# Patient Record
Sex: FEMALE | Race: WHITE | NOT HISPANIC OR LATINO | Employment: OTHER | URBAN - METROPOLITAN AREA
[De-identification: names, ages, dates, MRNs, and addresses within clinical notes are randomized per-mention and may not be internally consistent; named-entity substitution may affect disease eponyms.]

---

## 2017-01-04 ENCOUNTER — HOSPITAL ENCOUNTER (OUTPATIENT)
Dept: MAMMOGRAPHY | Facility: HOSPITAL | Age: 67
Discharge: HOME | End: 2017-01-04
Attending: INTERNAL MEDICINE | Admitting: INTERNAL MEDICINE

## 2017-01-04 DIAGNOSIS — Z00.00 HEALTH CARE MAINTENANCE: ICD-10-CM

## 2017-01-04 PROCEDURE — G0202 SCR MAMMO BI INCL CAD: HCPCS

## 2017-01-12 ENCOUNTER — OFFICE VISIT (OUTPATIENT)
Dept: ORTHOPEDIC SURGERY | Facility: CLINIC | Age: 67
End: 2017-01-12

## 2017-01-12 VITALS — HEIGHT: 65 IN | WEIGHT: 170 LBS | BODY MASS INDEX: 28.32 KG/M2 | TEMPERATURE: 98.6 F

## 2017-01-12 DIAGNOSIS — G89.29 CHRONIC LEFT SHOULDER PAIN: Primary | ICD-10-CM

## 2017-01-12 DIAGNOSIS — IMO0002 BURSITIS/TENDONITIS, SHOULDER: ICD-10-CM

## 2017-01-12 DIAGNOSIS — M25.512 CHRONIC LEFT SHOULDER PAIN: Primary | ICD-10-CM

## 2017-01-12 PROCEDURE — 99203 OFFICE O/P NEW LOW 30 MIN: CPT | Performed by: ORTHOPAEDIC SURGERY

## 2017-01-12 PROCEDURE — 73030 X-RAY EXAM OF SHOULDER: CPT | Performed by: ORTHOPAEDIC SURGERY

## 2017-01-12 PROCEDURE — 20610 DRAIN/INJ JOINT/BURSA W/O US: CPT | Performed by: ORTHOPAEDIC SURGERY

## 2017-01-12 PROCEDURE — 73010 X-RAY EXAM OF SHOULDER BLADE: CPT | Performed by: ORTHOPAEDIC SURGERY

## 2017-01-12 RX ORDER — METHYLPREDNISOLONE ACETATE 80 MG/ML
80 INJECTION, SUSPENSION INTRA-ARTICULAR; INTRALESIONAL; INTRAMUSCULAR; SOFT TISSUE
Status: COMPLETED | OUTPATIENT
Start: 2017-01-12 | End: 2017-01-12

## 2017-01-12 RX ORDER — BUPIVACAINE HYDROCHLORIDE 5 MG/ML
4 INJECTION, SOLUTION PERINEURAL
Status: COMPLETED | OUTPATIENT
Start: 2017-01-12 | End: 2017-01-12

## 2017-01-12 RX ADMIN — METHYLPREDNISOLONE ACETATE 80 MG: 80 INJECTION, SUSPENSION INTRA-ARTICULAR; INTRALESIONAL; INTRAMUSCULAR; SOFT TISSUE at 14:57

## 2017-01-12 RX ADMIN — BUPIVACAINE HYDROCHLORIDE 4 ML: 5 INJECTION, SOLUTION PERINEURAL at 14:57

## 2017-01-12 NOTE — MR AVS SNAPSHOT
"                        Marlyn Whitney   1/12/2017 1:45 PM   Office Visit    Dept Phone:  [phone]   Encounter #:  [id number]    Provider:  Josee Fish MD   Department:  [facility]                Your Full Care Plan              Your Updated Medication List          This list is accurate as of: 1/12/17  3:16 PM.  Always use your most recent med list.                ALPRAZolam 1 MG tablet   Commonly known as:  XANAX   TAKE 1 TABLET BY MOUTH 4 TIMES A DAY       amLODIPine-benazepril 5-20 MG per capsule   Commonly known as:  LOTREL 5-20   TAKE ONE CAPSULE BY MOUTH EVERY DAY       atorvastatin 80 MG tablet   Commonly known as:  LIPITOR   TAKE 1 TABLET EVERY DAY       azelastine 0.1 % nasal spray   Commonly known as:  ASTELIN       B-D INS SYRINGE 0.5CC/30GX1/2\" 30G X 1/2\" 0.5 ML misc   Generic drug:  Insulin Syringe-Needle U-100   FOR DAILY USE WITH LANTUS       betamethasone dipropionate 0.05 % cream   Commonly known as:  DIPROLENE       fenofibrate 160 MG tablet   TAKE 1 TABLET DAILY.       fluconazole 150 MG tablet   Commonly known as:  DIFLUCAN   Take 1 tablet by mouth Daily.       * glucose blood test strip       * ONETOUCH ULTRA BLUE VI       HUMALOG 100 UNIT/ML injection   Generic drug:  insulin lispro   INJECT 15 U. AT BREAFKAST 20 U. AT LUNCH & 15 U AT SUPPER UP TO 50 UN.PER DAY WITH SLIDING SCALE       hydrochlorothiazide 25 MG tablet   Commonly known as:  HYDRODIURIL   TAKE 1 TABLET BY MOUTH DAILY.       HYDROcodone-acetaminophen  MG per tablet   Commonly known as:  NORCO   Take 1 tablet by mouth Every 4 (Four) Hours As Needed for moderate pain (4-6).       insulin glargine 100 UNIT/ML injection   Commonly known as:  LANTUS       Insulin Pen Needle 31G X 8 MM misc       Linaclotide 145 MCG capsule   Commonly known as:  LINZESS   Take 145 mcg by mouth daily.       metFORMIN 1000 MG tablet   Commonly known as:  GLUCOPHAGE   Take 1 tablet " by mouth 2 (Two) Times a Day With Meals.       nabumetone 750 MG tablet   Commonly known as:  RELAFEN   TAKE 1 TABLET BY MOUTH 2 (TWO) TIMES A DAY AS NEEDED FOR MILD PAIN (1-3) OR MODERATE PAIN (4-6).       omeprazole 20 MG capsule   Commonly known as:  priLOSEC   TAKE 1 CAPSULE BY MOUTH DAILY       vitamin D 49273 UNITS capsule capsule   Commonly known as:  ERGOCALCIFEROL       * Notice:  This list has 2 medication(s) that are the same as other medications prescribed for you. Read the directions carefully, and ask your doctor or other care provider to review them with you.            We Performed the Following     Large Joint Arthrocentesis       You Were Diagnosed With        Codes Comments    Chronic left shoulder pain    -  Primary ICD-10-CM: M25.512, G89.29  ICD-9-CM: 719.41, 338.29       Instructions     None    Patient Instructions History      Upcoming Appointments     Visit Type Date Time Department    OFFICE VISIT 1/12/2017  1:45 PM [facility]    FOLLOW UP 2/13/2017  2:40 PM [facility]    OFFICE VISIT 4/21/2017  3:45 PM [facility] [id number]      [email or web address] Signup     Our records indicate that your [facility] [employer] account has been deactivated. If you would like to reactivate your account, please email [email or web address] or call [phone] to talk to our [employer] staff.             Other Info from Your Visit           Your Appointments     Feb 13, 2017  2:40 PM EST   Follow Up with Josee Fish MD   [facility] (--)    [address] [facility] [address]  [place] [id number] [phone]           Arrive 15 minutes prior to appointment.            Apr 21, 2017  3:45 PM EDT   Office Visit with Frank Mustafa MD   [facility] (--)    [address] [facility]  [facility] [address] [phone]           Arrive 15 minutes prior to appointment.              Allergies     Gabapentin      Lyrica  "[Pregabalin]      Wellbutrin [Bupropion]        Reason for Visit     Left Shoulder - Pain           Vital Signs     Temperature Height Weight Last Menstrual Period Body Mass Index Smoking Status    98.6 °F (37 °C) (Temporal Artery ) 65\" (165.1 cm) 170 lb (77.1 kg) (LMP Unknown) 28.29 kg/m2 Former Smoker      Problems and Diagnoses Noted     Pain in left shoulder        "

## 2017-01-12 NOTE — PROGRESS NOTES
"   New Shoulder      Patient: Marlyn Whitney        YOB: 1950    Medical Record Number: [id number]        Chief Complaints: Left shoulder pain  Chief Complaint   Patient presents with   • Left Shoulder - Pain         History of Present Illness: This is a  66 y.o. female who presents with complaints of left shoulder pain.  She is right-hand-dominant.  She states is been ongoing for a year worse the last 8 months.  She did not have one particular event that started it but she states her  who weighed 260 pounds was ill and she was having to lift him.  She did lose her  recently in sooner after lost a son.  She states she's been sleeping in a recliner for last 8 months as her shoulder hurts she has night pain no history of similar symptoms.  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  She describes her symptoms as severe constant stabbing aching with swelling her past medical history is mild for diabetes seizures anemia liver disease and depression anxiety  pain as a 7 out of 10 per the patient          Allergies:   Allergies   Allergen Reactions   • Gabapentin    • Lyrica [Pregabalin]    • Wellbutrin [Bupropion]        Medications:   Home Medications:  Current Outpatient Prescriptions on File Prior to Visit   Medication Sig   • ALPRAZolam (XANAX) 1 MG tablet TAKE 1 TABLET BY MOUTH 4 TIMES A DAY   • amLODIPine-benazepril (LOTREL 5-20) 5-20 MG per capsule TAKE ONE CAPSULE BY MOUTH EVERY DAY   • atorvastatin (LIPITOR) 80 MG tablet TAKE 1 TABLET EVERY DAY   • azelastine (ASTELIN) 0.1 % nasal spray into each nostril 2 (two) times a day.   • B-D INS SYRINGE 0.5CC/30GX1/2\" 30G X 1/2\" 0.5 ML misc FOR DAILY USE WITH LANTUS   • betamethasone dipropionate (DIPROLENE) 0.05 % cream Apply topically 2 (two) times a day.   • fenofibrate 160 MG tablet TAKE 1 TABLET DAILY.   • fluconazole (DIFLUCAN) 150 MG tablet Take 1 tablet by " mouth Daily.   • Glucose Blood (ONETOUCH ULTRA BLUE VI) OneTouch Ultra Blue In Vitro Strip; Patient Sig: OneTouch Ultra Blue In Vitro Strip USE TO CHECK BLOOD SUGAR 4 TIMES DAILY; 100; 4; 21-Feb-2013; Active   • glucose blood test strip OneTouch Ultra Blue In Vitro Strip; Patient Sig: OneTouch Ultra Blue In Vitro Strip USE TO CHECK BLOOD SUGAR 4 TIMES DAILY; 100; 4; 21-Feb-2013; Active   • HUMALOG 100 UNIT/ML injection INJECT 15 U. AT BREAFKAST 20 U. AT LUNCH & 15 U AT SUPPER UP TO 50 UN.PER DAY WITH SLIDING SCALE   • hydrochlorothiazide (HYDRODIURIL) 25 MG tablet TAKE 1 TABLET BY MOUTH DAILY.   • HYDROcodone-acetaminophen (NORCO)  MG per tablet Take 1 tablet by mouth Every 4 (Four) Hours As Needed for moderate pain (4-6).   • insulin glargine (LANTUS) 100 UNIT/ML injection Inject  under the skin.   • Insulin Pen Needle 31G X 8 MM misc    • Linaclotide (LINZESS) 145 MCG capsule Take 145 mcg by mouth daily.   • metFORMIN (GLUCOPHAGE) 1000 MG tablet Take 1 tablet by mouth 2 (Two) Times a Day With Meals.   • nabumetone (RELAFEN) 750 MG tablet TAKE 1 TABLET BY MOUTH 2 (TWO) TIMES A DAY AS NEEDED FOR MILD PAIN (1-3) OR MODERATE PAIN (4-6).   • omeprazole (priLOSEC) 20 MG capsule TAKE 1 CAPSULE BY MOUTH DAILY   • vitamin D (ERGOCALCIFEROL) 31446 UNITS capsule capsule Take  by mouth Every 7 (Seven) Days.     No current facility-administered medications on file prior to visit.      Current Medications:  Scheduled Meds:  Continuous Infusions:  No current facility-administered medications for this visit.   PRN Meds:.    Past Medical History   Diagnosis Date   • Cystitis    • Diabetic peripheral neuropathy    • Nephrolithiasis         Past Surgical History   Procedure Laterality Date   • Liver biopsy  11/01/2012   • Upper gastrointestinal endoscopy  11/26/2012   • Partial hysterectomy     • Laparoscopic cholecystectomy w/ cholangiography  11/01/2012   • Breast biopsy          Social History     Occupational History   •  "Not on file.     Social History Main Topics   • Smoking status: Former Smoker   • Smokeless tobacco: Not on file   • Alcohol use No   • Drug use: No   • Sexual activity: Not on file    Social History     Social History Narrative      History reviewed. No pertinent family history.          Review of Systems: 14 point review of systems are remarkable for the pertinent positives listed in the chart by the patient the remainder are negative    Review of Systems      Physical Exam: 66 y.o. female  General Appearance:    Alert, cooperative, in no acute distress                 Vitals:    01/12/17 1427   Temp: 98.6 °F (37 °C)   TempSrc: Temporal Artery    Weight: 170 lb (77.1 kg)   Height: 65\" (165.1 cm)      Patient is alert and read ×3 no acute distress appears her above-listed at height weight and age.  Affect is normal respiratory rate is normal unlabored. Heart rate regular rate rhythm, sclera, dentition and hearing are normal for the purpose of this exam.    Ortho Exam Physical exam of the left shoulder reveals no overlying skin changes no lymphedema no lymphadenopathy.  Patient has active flexion 180 with mild symptoms abduction is similar external rotation is to 50 and internal rotation to the upper lumbar spine with mild symptoms.  Patient has good rotator cuff strength 4+ over 5 with isometric strength testing with pain.  Patient has a positive impingement and a positive Adams sign.  Patient has good cervical range of motion which is full and asymptomatic no radicular symptoms.  Patient has a normal elbow exam.  Good distal pulses are presentPatient has pain with overhead activity and a positive Neer sign and a positive empty can sign        Large Joint Arthrocentesis  Date/Time: 1/12/2017 2:57 PM  Consent given by: patient  Site marked: site marked  Timeout: Immediately prior to procedure a time out was called to verify the correct patient, procedure, equipment, support staff and site/side marked as required "   Supporting Documentation  Indications: pain   Procedure Details  Location: shoulder - L subacromial bursa  Preparation: Patient was prepped and draped in the usual sterile fashion  Needle size: 25 G  Approach: posterior  Medications administered: 80 mg methylPREDNISolone acetate 80 MG/ML; 4 mL bupivacaine 0.5 %  Patient tolerance: patient tolerated the procedure well with no immediate complications                Radiology:   AP, Scapular Y and Axillary Lateral of the left shoulder were ordered/reviewed to evauate shoulder pain.  I've no comparative films.  She does have degenerative changes seen at the acromioclavicular joint otherwise a heads well seated within the glenoid    Assessment/Plan:  Left shoulder pain I think this is more impingement it is rotator cuff and some fashion plan is to proceed with an injection and some strengthening exercises.  I'll see her back in 4 weeks if she fails improvement we will pursue other means of testing

## 2017-01-18 RX ORDER — HYDROCODONE BITARTRATE AND ACETAMINOPHEN 10; 325 MG/1; MG/1
1 TABLET ORAL EVERY 4 HOURS PRN
Qty: 180 TABLET | Refills: 0 | Status: SHIPPED | OUTPATIENT
Start: 2017-01-18 | End: 2017-02-16 | Stop reason: SDUPTHER

## 2017-01-25 RX ORDER — FENOFIBRATE 160 MG/1
TABLET ORAL
Qty: 90 TABLET | Refills: 0 | Status: SHIPPED | OUTPATIENT
Start: 2017-01-25 | End: 2017-05-12 | Stop reason: SDUPTHER

## 2017-02-13 ENCOUNTER — OFFICE VISIT (OUTPATIENT)
Dept: ORTHOPEDIC SURGERY | Facility: CLINIC | Age: 67
End: 2017-02-13

## 2017-02-13 VITALS — HEIGHT: 65 IN | BODY MASS INDEX: 27.49 KG/M2 | WEIGHT: 165 LBS | TEMPERATURE: 97.5 F

## 2017-02-13 DIAGNOSIS — M75.02 ADHESIVE CAPSULITIS OF LEFT SHOULDER: Primary | ICD-10-CM

## 2017-02-13 PROCEDURE — 99212 OFFICE O/P EST SF 10 MIN: CPT | Performed by: ORTHOPAEDIC SURGERY

## 2017-02-13 PROCEDURE — 20610 DRAIN/INJ JOINT/BURSA W/O US: CPT | Performed by: ORTHOPAEDIC SURGERY

## 2017-02-13 RX ADMIN — METHYLPREDNISOLONE ACETATE 80 MG: 80 INJECTION, SUSPENSION INTRA-ARTICULAR; INTRALESIONAL; INTRAMUSCULAR; SOFT TISSUE at 16:06

## 2017-02-13 RX ADMIN — BUPIVACAINE HYDROCHLORIDE 4 ML: 5 INJECTION, SOLUTION PERINEURAL at 16:06

## 2017-02-13 NOTE — PROGRESS NOTES
"Shoulder Follow Up      Patient: Marlyn Whitney        YOB: 1950            Chief Complaints: Left Shoulder pain      History of Present Illness: Patient is here follow-up of left shoulder pain I injected last visit thinking it was more impingement she states she got a couple days relief but now is pretty miserable.  She is here for a new direction.  She describes she has marked limitation range of motion now pain at night pain that is severe with any activity      Physical Exam: 67 y.o. female  General Appearance:    Alert, cooperative, in no acute distress                   Vitals:    02/13/17 1524   Temp: 97.5 °F (36.4 °C)   Weight: 165 lb (74.8 kg)   Height: 65\" (165.1 cm)        Patient is alert and read ×3 no acute distress appears her above-listed at height weight and age.  Affect is normal respiratory rate is normal unlabored. Heart rate regular rate rhythm, sclera, dentition and hearing are normal for the purpose of this exam.      Ortho Exam    Physical exam of the left shoulder reveals no overlying skin changes no lymphedema no lymphadenopathy.  Patient has active flexion 150 with mild symptoms passively I can get him to about 160 abduction is similar external rotation is to 0 and internal rotation to his buttocks with  symptoms.  Patient has good rotator cuff strength 4+ over 5 with isometric strength testing with pain.  Patient has a positive impingement and a positive Adams sign.  Patient has good cervical range of motion which is full and asymptomatic no radicular symptoms.  Patient has a normal elbow exam.  Good distal pulses are present      Large Joint Arthrocentesis  Date/Time: 2/13/2017 4:06 PM  Consent given by: patient  Site marked: site marked  Timeout: Immediately prior to procedure a time out was called to verify the correct patient, procedure, equipment, support staff and site/side marked as required   Supporting Documentation  Indications: pain   Procedure " Details  Location: shoulder - L glenohumeral  Preparation: Patient was prepped and draped in the usual sterile fashion  Needle size: 25 G  Approach: posterior  Medications administered: 4 mL bupivacaine; 80 mg methylPREDNISolone acetate 80 MG/ML  Patient tolerance: patient tolerated the procedure well with no immediate complications            Assessment/Plan:      This lady presents for follow-up left shoulder pain I initially thought this was more impingement however after repeat exam today I do think she has limitation of her range of motion and she is a frozen shoulder.  I explained to her that very frequently impingement frozen shoulder roll present the same way.  Her motion is clearly limited today I think her diagnosis is adhesive capsulitis I think she would benefit from a glenohumeral joint injection and physical therapy which she is agreeable to do.  Based on the fact that this was a complete reevaluation and a change in direction a diagnosis and a different area injected I do think an office visit chart is warranted.  She understands if we do not regain her motion we will proceed with a manipulation.  When her motion has returned if she is still symptomatic we will pursue an MRI

## 2017-02-14 RX ORDER — METHYLPREDNISOLONE ACETATE 80 MG/ML
80 INJECTION, SUSPENSION INTRA-ARTICULAR; INTRALESIONAL; INTRAMUSCULAR; SOFT TISSUE
Status: COMPLETED | OUTPATIENT
Start: 2017-02-13 | End: 2017-02-13

## 2017-02-14 RX ORDER — BUPIVACAINE HYDROCHLORIDE 5 MG/ML
4 INJECTION, SOLUTION PERINEURAL
Status: COMPLETED | OUTPATIENT
Start: 2017-02-13 | End: 2017-02-13

## 2017-02-16 RX ORDER — HYDROCODONE BITARTRATE AND ACETAMINOPHEN 10; 325 MG/1; MG/1
1 TABLET ORAL EVERY 4 HOURS PRN
Qty: 180 TABLET | Refills: 0 | Status: SHIPPED | OUTPATIENT
Start: 2017-02-16 | End: 2017-03-16 | Stop reason: SDUPTHER

## 2017-03-06 RX ORDER — ALPRAZOLAM 1 MG/1
TABLET ORAL
Qty: 120 TABLET | Refills: 0 | Status: SHIPPED | OUTPATIENT
Start: 2017-03-06 | End: 2017-04-03 | Stop reason: SDUPTHER

## 2017-03-17 RX ORDER — HYDROCODONE BITARTRATE AND ACETAMINOPHEN 10; 325 MG/1; MG/1
1 TABLET ORAL EVERY 4 HOURS PRN
Qty: 180 TABLET | Refills: 0 | Status: SHIPPED | OUTPATIENT
Start: 2017-03-17 | End: 2017-04-21 | Stop reason: SDUPTHER

## 2017-03-20 RX ORDER — NAPROXEN SODIUM 220 MG
TABLET ORAL
Qty: 100 EACH | Refills: 11 | Status: SHIPPED | OUTPATIENT
Start: 2017-03-20 | End: 2018-09-10 | Stop reason: SDUPTHER

## 2017-03-28 RX ORDER — AMLODIPINE BESYLATE AND BENAZEPRIL HYDROCHLORIDE 5; 20 MG/1; MG/1
1 CAPSULE ORAL DAILY
Qty: 90 CAPSULE | Refills: 2 | Status: SHIPPED | OUTPATIENT
Start: 2017-03-28 | End: 2017-04-21 | Stop reason: SDUPTHER

## 2017-03-28 RX ORDER — OMEPRAZOLE 20 MG/1
CAPSULE, DELAYED RELEASE ORAL
Qty: 90 CAPSULE | Refills: 0 | Status: SHIPPED | OUTPATIENT
Start: 2017-03-28 | End: 2017-06-29 | Stop reason: SDUPTHER

## 2017-04-04 RX ORDER — ALPRAZOLAM 1 MG/1
TABLET ORAL
Qty: 120 TABLET | Refills: 0 | OUTPATIENT
Start: 2017-04-04 | End: 2017-05-31 | Stop reason: SDUPTHER

## 2017-04-21 ENCOUNTER — OFFICE VISIT (OUTPATIENT)
Dept: INTERNAL MEDICINE | Facility: CLINIC | Age: 67
End: 2017-04-21

## 2017-04-21 VITALS
WEIGHT: 170 LBS | DIASTOLIC BLOOD PRESSURE: 71 MMHG | TEMPERATURE: 98.9 F | BODY MASS INDEX: 28.32 KG/M2 | HEIGHT: 65 IN | OXYGEN SATURATION: 97 % | HEART RATE: 97 BPM | SYSTOLIC BLOOD PRESSURE: 146 MMHG

## 2017-04-21 DIAGNOSIS — K76.0 NONALCOHOLIC FATTY LIVER DISEASE: ICD-10-CM

## 2017-04-21 DIAGNOSIS — E53.8 COBALAMIN DEFICIENCY: ICD-10-CM

## 2017-04-21 DIAGNOSIS — IMO0001 IDDM (INSULIN DEPENDENT DIABETES MELLITUS): ICD-10-CM

## 2017-04-21 DIAGNOSIS — Z00.00 MEDICARE ANNUAL WELLNESS VISIT, INITIAL: Primary | ICD-10-CM

## 2017-04-21 DIAGNOSIS — E78.49 OTHER HYPERLIPIDEMIA: ICD-10-CM

## 2017-04-21 DIAGNOSIS — E55.9 VITAMIN D DEFICIENCY: ICD-10-CM

## 2017-04-21 DIAGNOSIS — N30.00 ACUTE CYSTITIS WITHOUT HEMATURIA: ICD-10-CM

## 2017-04-21 DIAGNOSIS — M15.9 GENERALIZED OSTEOARTHRITIS: ICD-10-CM

## 2017-04-21 DIAGNOSIS — F41.1 GENERALIZED ANXIETY DISORDER: ICD-10-CM

## 2017-04-21 DIAGNOSIS — K59.01 SLOW TRANSIT CONSTIPATION: ICD-10-CM

## 2017-04-21 PROCEDURE — G0402 INITIAL PREVENTIVE EXAM: HCPCS | Performed by: INTERNAL MEDICINE

## 2017-04-21 PROCEDURE — 99214 OFFICE O/P EST MOD 30 MIN: CPT | Performed by: INTERNAL MEDICINE

## 2017-04-21 RX ORDER — FLUCONAZOLE 150 MG/1
150 TABLET ORAL DAILY
Qty: 5 TABLET | Refills: 0 | Status: SHIPPED | OUTPATIENT
Start: 2017-04-21 | End: 2017-06-29

## 2017-04-21 RX ORDER — HYDROCODONE BITARTRATE AND ACETAMINOPHEN 10; 325 MG/1; MG/1
1 TABLET ORAL EVERY 4 HOURS PRN
Qty: 180 TABLET | Refills: 0 | Status: SHIPPED | OUTPATIENT
Start: 2017-04-21 | End: 2017-05-16 | Stop reason: SDUPTHER

## 2017-04-21 RX ORDER — AMLODIPINE BESYLATE AND BENAZEPRIL HYDROCHLORIDE 5; 20 MG/1; MG/1
1 CAPSULE ORAL DAILY
Qty: 90 CAPSULE | Refills: 2 | Status: SHIPPED | OUTPATIENT
Start: 2017-04-21 | End: 2017-06-29

## 2017-04-21 RX ORDER — HYDROCODONE BITARTRATE AND ACETAMINOPHEN 10; 325 MG/1; MG/1
1 TABLET ORAL EVERY 4 HOURS PRN
Qty: 180 TABLET | Refills: 0 | Status: SHIPPED | OUTPATIENT
Start: 2017-04-21 | End: 2017-04-21 | Stop reason: SDUPTHER

## 2017-04-21 RX ORDER — NITROFURANTOIN 25; 75 MG/1; MG/1
100 CAPSULE ORAL 2 TIMES DAILY
Qty: 16 CAPSULE | Refills: 0 | Status: SHIPPED | OUTPATIENT
Start: 2017-04-21 | End: 2017-05-01 | Stop reason: SDUPTHER

## 2017-04-21 NOTE — PATIENT INSTRUCTIONS
Medicare Wellness  Personal Prevention Plan of Service     Date of Office Visit:  2017  Encounter Provider:  Frank Mustafa MD  Place of Service:  [facility]  Patient Name: Marlyn Whitney  :  1950    As part of the Medicare Wellness portion of your visit today, we are providing you with this personalized preventive plan of services (PPPS). This plan is based upon recommendations of the [place] Preventive Services Task Force (USPSTF) and the Advisory Committee on Immunization Practices (ACIP).    This lists the preventive care services that should be considered, and provides dates of when you are due. Items listed as completed are up-to-date and do not require any further intervention.    Health Maintenance   Topic Date Due   • TDAP/TD VACCINES (1 - Tdap) 1971   • ZOSTER VACCINE  2016   • LIPID PANEL  2017   • HEMOGLOBIN A1C  2017   • DIABETIC EYE EXAM  05/15/2017   • DIABETIC FOOT EXAM  10/21/2017   • URINE MICROALBUMIN  10/21/2017   • MEDICARE ANNUAL WELLNESS  2018   • PNEUMOCOCCAL VACCINES (65+ LOW/MEDIUM RISK) (2 of 2 - PPSV23) 2018   • MAMMOGRAM  2019   • PAP SMEAR  2020   • COLONOSCOPY  2027   • HEPATITIS C SCREENING  Addressed   • INFLUENZA VACCINE  Addressed       No orders of the defined types were placed in this encounter.      Return in about 6 months (around 10/21/2017).

## 2017-04-21 NOTE — PROGRESS NOTES
QUICK REFERENCE INFORMATION:  The ABCs of the Annual Wellness Visit    Initial Medicare Wellness Visit    HEALTH RISK ASSESSMENT    1950    Recent Hospitalizations:  No recent hospitalization(s)..        Current Medical Providers:  Patient Care Team:  Frank Mustafa MD as PCP - General        Smoking Status:  History   Smoking Status   • Former Smoker   Smokeless Tobacco   • Not on file       Alcohol Consumption:  History   Alcohol Use No       Depression Screen:   PHQ-9 Depression Screening 4/21/2017   Little interest or pleasure in doing things 0   Feeling down, depressed, or hopeless 1   Trouble falling or staying asleep, or sleeping too much 0   Feeling tired or having little energy 0   Poor appetite or overeating 0   Feeling bad about yourself - or that you are a failure or have let yourself or your family down 0   Trouble concentrating on things, such as reading the newspaper or watching television 0   Moving or speaking so slowly that other people could have noticed. Or the opposite - being so fidgety or restless that you have been moving around a lot more than usual 0   Thoughts that you would be better off dead, or of hurting yourself in some way 0   PHQ-9 Total Score 1   If you checked off any problems, how difficult have these problems made it for you to do your work, take care of things at home, or get along with other people? Not difficult at all       Health Habits and Functional and Cognitive Screening:  No flowsheet data found.               Does the patient have evidence of cognitive impairment? No    Asiprin use counseling: Start ASA 81 mg daily       Recent Lab Results:    Visual Acuity:  No exam data present    Age-appropriate Screening Schedule:  Refer to the list below for future screening recommendations based on patient's age, sex and/or medical conditions. Orders for these recommended tests are listed in the plan section. The patient has been provided with a written plan.    Health  "Maintenance   Topic Date Due   • TDAP/TD VACCINES (1 - Tdap) 01/19/1971   • ZOSTER VACCINE  01/26/2016   • LIPID PANEL  04/04/2017   • HEMOGLOBIN A1C  04/21/2017   • DIABETIC EYE EXAM  05/15/2017   • DIABETIC FOOT EXAM  10/21/2017   • URINE MICROALBUMIN  10/21/2017   • PNEUMOCOCCAL VACCINES (65+ LOW/MEDIUM RISK) (2 of 2 - PPSV23) 04/21/2018   • MAMMOGRAM  01/04/2019   • PAP SMEAR  04/21/2020   • COLONOSCOPY  04/21/2027   • INFLUENZA VACCINE  Addressed        Subjective   History of Present Illness    Marlyn Whitney is a 67 y.o. female who presents for an Annual Wellness Visit.    The following portions of the patient's history were reviewed and updated as appropriate: allergies, current medications, past family history, past medical history, past social history, past surgical history and problem list.    Outpatient Medications Prior to Visit   Medication Sig Dispense Refill   • ALPRAZolam (XANAX) 1 MG tablet TAKE 1 TABLET BY MOUTH FOUR TIMES DAILY 120 tablet 0   • amLODIPine-benazepril (LOTREL 5-20) 5-20 MG per capsule Take 1 capsule by mouth Daily. 90 capsule 2   • atorvastatin (LIPITOR) 80 MG tablet TAKE 1 TABLET EVERY DAY 30 tablet 4   • azelastine (ASTELIN) 0.1 % nasal spray into each nostril 2 (two) times a day.     • B-D INS SYRINGE 0.5CC/30GX1/2\" 30G X 1/2\" 0.5 ML misc FOR DAILY USE WITH LANTUS 90 each 3   • betamethasone dipropionate (DIPROLENE) 0.05 % cream Apply topically 2 (two) times a day.     • fenofibrate 160 MG tablet TAKE 1 TABLET DAILY. 90 tablet 0   • glucose blood test strip OneTouch Ultra Blue In Vitro Strip; Patient Sig: OneTouch Ultra Blue In Vitro Strip USE TO CHECK BLOOD SUGAR 4 TIMES DAILY; 100; 4; 21-Feb-2013; Active     • HUMALOG 100 UNIT/ML injection INJECT 15 UNITS AT BREAKFAST, 20 UNITS AT LUNCH AND 15 TO 50 UNITS PER SLIDING SCALE EVERY EVENING WITH SUPPER 20 mL 0   • hydrochlorothiazide (HYDRODIURIL) 25 MG tablet TAKE 1 TABLET BY MOUTH DAILY. 90 tablet 1   • insulin glargine " "(LANTUS) 100 UNIT/ML injection Inject  under the skin.     • Insulin Pen Needle 31G X 8 MM misc      • Insulin Syringe 30G X 5/16\" 0.5 ML misc USE DAILY WITH LANTUS 100 each 11   • Linaclotide (LINZESS) 145 MCG capsule Take 145 mcg by mouth daily. 20 capsule 0   • metFORMIN (GLUCOPHAGE) 1000 MG tablet Take 1 tablet by mouth 2 (Two) Times a Day With Meals. 180 tablet 3   • nabumetone (RELAFEN) 750 MG tablet TAKE 1 TABLET BY MOUTH 2 (TWO) TIMES A DAY AS NEEDED FOR MILD PAIN (1-3) OR MODERATE PAIN (4-6). 60 tablet 0   • omeprazole (priLOSEC) 20 MG capsule TAKE 1 CAPSULE BY MOUTH DAILY 90 capsule 0   • ONE TOUCH ULTRA TEST test strip USE TO TEST BLOOD GLUCOSE FOUR TIMES DAILY 300 each 12   • vitamin D (ERGOCALCIFEROL) 69293 UNITS capsule capsule Take  by mouth Every 7 (Seven) Days.     • fluconazole (DIFLUCAN) 150 MG tablet Take 1 tablet by mouth Daily. 5 tablet 0   • HYDROcodone-acetaminophen (NORCO)  MG per tablet Take 1 tablet by mouth Every 4 (Four) Hours As Needed for Moderate Pain (4-6). 180 tablet 0     No facility-administered medications prior to visit.        Patient Active Problem List   Diagnosis   • Left lower quadrant pain   • Right upper quadrant pain   • Acute cystitis   • Acute frontal sinusitis   • Acute maxillary sinusitis   • Asthmatic bronchitis   • Carpal tunnel syndrome   • Eczema   • Brittle diabetes mellitus   • Dysuria   • Fatigue   • Fibrocystic breast changes   • Generalized anxiety disorder   • Generalized osteoarthritis   • Hyperlipidemia   • Hypoglycemia   • Foot-drop   • Nonalcoholic fatty liver disease   • Otitis media   • Peripheral neuropathy   • Sciatica   • IDDM (insulin dependent diabetes mellitus)   • Upper respiratory tract infection   • Increased frequency of urination   • Urinary urgency   • Urinary tract infection   • Candidiasis of vagina   • Vertigo   • Cobalamin deficiency   • Vitamin D deficiency   • Adhesive capsulitis of left shoulder   • Left shoulder pain   • " "Health care maintenance   • Slow transit constipation   • Bursitis/tendonitis, shoulder       Advance Care Planning:  has NO advance directive - not interested in additional information    Identification of Risk Factors:  Risk factors include: inactivity.    Review of Systems    Compared to one year ago, the patient feels her physical health is worse.  Compared to one year ago, the patient feels her mental health is worse.    Objective     Physical Exam    Vitals:    04/21/17 1642   BP: 146/71   BP Location: Left arm   Patient Position: Sitting   Cuff Size: Adult   Pulse: 97   Temp: 98.9 °F (37.2 °C)   TempSrc: Tympanic   SpO2: 97%   Weight: 170 lb (77.1 kg)   Height: 65\" (165.1 cm)       Body mass index is 28.29 kg/(m^2).  Discussed the patient's BMI with her. The BMI is above average; BMI management plan is completed.    Assessment/Plan   Patient Self-Management and Personalized Health Advice  The patient has been provided with information about: diet, exercise and weight management and preventive services including:   · Exercise counseling provided, Nutrition counseling provided.    Visit Diagnoses:  No diagnosis found.    No orders of the defined types were placed in this encounter.      Outpatient Encounter Prescriptions as of 4/21/2017   Medication Sig Dispense Refill   • ALPRAZolam (XANAX) 1 MG tablet TAKE 1 TABLET BY MOUTH FOUR TIMES DAILY 120 tablet 0   • amLODIPine-benazepril (LOTREL 5-20) 5-20 MG per capsule Take 1 capsule by mouth Daily. 90 capsule 2   • atorvastatin (LIPITOR) 80 MG tablet TAKE 1 TABLET EVERY DAY 30 tablet 4   • azelastine (ASTELIN) 0.1 % nasal spray into each nostril 2 (two) times a day.     • B-D INS SYRINGE 0.5CC/30GX1/2\" 30G X 1/2\" 0.5 ML misc FOR DAILY USE WITH LANTUS 90 each 3   • betamethasone dipropionate (DIPROLENE) 0.05 % cream Apply topically 2 (two) times a day.     • fenofibrate 160 MG tablet TAKE 1 TABLET DAILY. 90 tablet 0   • glucose blood test strip OneTouch Ultra Blue " "In Vitro Strip; Patient Sig: OneTouch Ultra Blue In Vitro Strip USE TO CHECK BLOOD SUGAR 4 TIMES DAILY; 100; 4; 21-Feb-2013; Active     • HUMALOG 100 UNIT/ML injection INJECT 15 UNITS AT BREAKFAST, 20 UNITS AT LUNCH AND 15 TO 50 UNITS PER SLIDING SCALE EVERY EVENING WITH SUPPER 20 mL 0   • hydrochlorothiazide (HYDRODIURIL) 25 MG tablet TAKE 1 TABLET BY MOUTH DAILY. 90 tablet 1   • insulin glargine (LANTUS) 100 UNIT/ML injection Inject  under the skin.     • Insulin Pen Needle 31G X 8 MM misc      • Insulin Syringe 30G X 5/16\" 0.5 ML misc USE DAILY WITH LANTUS 100 each 11   • Linaclotide (LINZESS) 145 MCG capsule Take 145 mcg by mouth daily. 20 capsule 0   • metFORMIN (GLUCOPHAGE) 1000 MG tablet Take 1 tablet by mouth 2 (Two) Times a Day With Meals. 180 tablet 3   • nabumetone (RELAFEN) 750 MG tablet TAKE 1 TABLET BY MOUTH 2 (TWO) TIMES A DAY AS NEEDED FOR MILD PAIN (1-3) OR MODERATE PAIN (4-6). 60 tablet 0   • omeprazole (priLOSEC) 20 MG capsule TAKE 1 CAPSULE BY MOUTH DAILY 90 capsule 0   • ONE TOUCH ULTRA TEST test strip USE TO TEST BLOOD GLUCOSE FOUR TIMES DAILY 300 each 12   • vitamin D (ERGOCALCIFEROL) 70488 UNITS capsule capsule Take  by mouth Every 7 (Seven) Days.     • [DISCONTINUED] fluconazole (DIFLUCAN) 150 MG tablet Take 1 tablet by mouth Daily. 5 tablet 0   • [DISCONTINUED] HYDROcodone-acetaminophen (NORCO)  MG per tablet Take 1 tablet by mouth Every 4 (Four) Hours As Needed for Moderate Pain (4-6). 180 tablet 0     No facility-administered encounter medications on file as of 4/21/2017.        Reviewed use of high risk medication in the elderly: yes  Reviewed for potential of harmful drug interactions in the elderly: yes    Follow Up:  No Follow-up on file.     An After Visit Summary and PPPS with all of these plans were given to the patient.          "

## 2017-04-24 LAB
ALBUMIN SERPL-MCNC: 4.6 G/DL (ref 3.5–5.2)
ALBUMIN/GLOB SERPL: 1.9 G/DL
ALP SERPL-CCNC: 63 U/L (ref 39–117)
ALT SERPL-CCNC: 24 U/L (ref 1–33)
APPEARANCE UR: (no result)
AST SERPL-CCNC: 29 U/L (ref 1–32)
BACTERIA #/AREA URNS HPF: ABNORMAL /HPF
BACTERIA UR CULT: ABNORMAL
BASOPHILS # BLD AUTO: 0.05 10*3/MM3 (ref 0–0.2)
BASOPHILS NFR BLD AUTO: 0.5 % (ref 0–1.5)
BILIRUB SERPL-MCNC: 0.3 MG/DL (ref 0.1–1.2)
BILIRUB UR QL STRIP: NEGATIVE
BUN SERPL-MCNC: 13 MG/DL (ref 8–23)
BUN/CREAT SERPL: 16.7 (ref 7–25)
CALCIUM SERPL-MCNC: 9.7 MG/DL (ref 8.6–10.5)
CASTS URNS MICRO: ABNORMAL
CHLORIDE SERPL-SCNC: 94 MMOL/L (ref 98–107)
CHOLEST SERPL-MCNC: 153 MG/DL (ref 0–200)
CO2 SERPL-SCNC: 30.1 MMOL/L (ref 22–29)
COLOR UR: YELLOW
CREAT SERPL-MCNC: 0.78 MG/DL (ref 0.57–1)
EOSINOPHIL # BLD AUTO: 0.37 10*3/MM3 (ref 0–0.7)
EOSINOPHIL NFR BLD AUTO: 4 % (ref 0.3–6.2)
EPI CELLS #/AREA URNS HPF: ABNORMAL /HPF
ERYTHROCYTE [DISTWIDTH] IN BLOOD BY AUTOMATED COUNT: 13 % (ref 11.7–13)
GLOBULIN SER CALC-MCNC: 2.4 GM/DL
GLUCOSE SERPL-MCNC: 157 MG/DL (ref 65–99)
GLUCOSE UR QL: NEGATIVE
HBA1C MFR BLD: 7.22 % (ref 4.8–5.6)
HCT VFR BLD AUTO: 41.5 % (ref 35.6–45.5)
HDLC SERPL-MCNC: 25 MG/DL (ref 40–60)
HGB BLD-MCNC: 13.6 G/DL (ref 11.9–15.5)
HGB UR QL STRIP: (no result)
IMM GRANULOCYTES # BLD: 0.02 10*3/MM3 (ref 0–0.03)
IMM GRANULOCYTES NFR BLD: 0.2 % (ref 0–0.5)
KETONES UR QL STRIP: NEGATIVE
LDLC SERPL CALC-MCNC: ABNORMAL MG/DL
LDLC/HDLC SERPL: ABNORMAL {RATIO}
LEUKOCYTE ESTERASE UR QL STRIP: (no result)
LYMPHOCYTES # BLD AUTO: 2.78 10*3/MM3 (ref 0.9–4.8)
LYMPHOCYTES NFR BLD AUTO: 29.8 % (ref 19.6–45.3)
MCH RBC QN AUTO: 30.4 PG (ref 26.9–32)
MCHC RBC AUTO-ENTMCNC: 32.8 G/DL (ref 32.4–36.3)
MCV RBC AUTO: 92.8 FL (ref 80.5–98.2)
MICROALBUMIN UR-MCNC: 33.4 UG/ML
MONOCYTES # BLD AUTO: 0.55 10*3/MM3 (ref 0.2–1.2)
MONOCYTES NFR BLD AUTO: 5.9 % (ref 5–12)
NEUTROPHILS # BLD AUTO: 5.56 10*3/MM3 (ref 1.9–8.1)
NEUTROPHILS NFR BLD AUTO: 59.6 % (ref 42.7–76)
NITRITE UR QL STRIP: POSITIVE
OTHER ANTIBIOTIC SUSC ISLT: ABNORMAL
PH UR STRIP: 6 [PH] (ref 5–8)
PLATELET # BLD AUTO: 310 10*3/MM3 (ref 140–500)
POTASSIUM SERPL-SCNC: 3.8 MMOL/L (ref 3.5–5.2)
PROT SERPL-MCNC: 7 G/DL (ref 6–8.5)
PROT UR QL STRIP: NEGATIVE
RBC # BLD AUTO: 4.47 10*6/MM3 (ref 3.9–5.2)
RBC #/AREA URNS HPF: ABNORMAL /HPF
SODIUM SERPL-SCNC: 138 MMOL/L (ref 136–145)
SP GR UR: 1.02 (ref 1–1.03)
T4 FREE SERPL-MCNC: 1 NG/DL (ref 0.93–1.7)
TRIGL SERPL-MCNC: 404 MG/DL (ref 0–150)
TSH SERPL DL<=0.005 MIU/L-ACNC: 2.43 MIU/ML (ref 0.27–4.2)
UROBILINOGEN UR STRIP-MCNC: (no result) MG/DL
VLDLC SERPL CALC-MCNC: ABNORMAL MG/DL
WBC # BLD AUTO: 9.33 10*3/MM3 (ref 4.5–10.7)
WBC #/AREA URNS HPF: ABNORMAL /HPF

## 2017-04-24 RX ORDER — HYDROCHLOROTHIAZIDE 25 MG/1
TABLET ORAL
Qty: 90 TABLET | Refills: 0 | Status: SHIPPED | OUTPATIENT
Start: 2017-04-24 | End: 2017-07-21 | Stop reason: SDUPTHER

## 2017-04-29 NOTE — PROGRESS NOTES
Subjective   Marlyn Whitney is a 67 y.o. female.   She is here today for Medicare annual wellness visit initial along with insulin-dependent diabetes mellitus hyperlipidemia nonalcoholic fatty liver disease cobalamin deficiency vitamin D deficiency slow transit constipation generalized osteoarthritis generalized anxiety disorder and acute cystitis  History of Present Illness   She is here today for Medicare annual wellness visit initial along with insulin-dependent diabetes mellitus hypokalemia nonalcoholic fatty liver disease cobalamin deficiency vitamin D deficiency slow transit constipation generalized osteoarthritis generalized anxiety disorder and acute cystitis  The following portions of the patient's history were reviewed and updated as appropriate: allergies, current medications, past family history, past medical history, past social history, past surgical history and problem list.    Review of Systems   Gastrointestinal: Positive for constipation.   Genitourinary: Positive for dysuria and frequency.   Musculoskeletal: Positive for arthralgias.   Psychiatric/Behavioral: Positive for dysphoric mood. The patient is nervous/anxious.    All other systems reviewed and are negative.      Objective   Physical Exam   Constitutional: She is oriented to person, place, and time. Vital signs are normal. She appears well-developed and well-nourished. She is active.   HENT:   Head: Normocephalic and atraumatic.   Right Ear: Hearing, tympanic membrane, external ear and ear canal normal.   Left Ear: Hearing, tympanic membrane, external ear and ear canal normal.   Nose: Nose normal.   Mouth/Throat: Uvula is midline, oropharynx is clear and moist and mucous membranes are normal.   Eyes: Conjunctivae, EOM and lids are normal. Pupils are equal, round, and reactive to light. Right eye exhibits no discharge. Left eye exhibits no discharge.   Neck: Trachea normal, normal range of motion, full passive range of motion without  pain and phonation normal. Neck supple. Carotid bruit is not present. No edema present. No thyroid mass and no thyromegaly present.   Cardiovascular: Normal rate, regular rhythm, normal heart sounds, intact distal pulses and normal pulses.  Exam reveals no gallop and no friction rub.    No murmur heard.  Pulmonary/Chest: Effort normal and breath sounds normal. No respiratory distress. She has no wheezes. She has no rales.   Abdominal: Soft. Normal appearance, normal aorta and bowel sounds are normal. She exhibits no distension, no abdominal bruit and no mass. There is no hepatosplenomegaly. There is no tenderness. There is no rebound, no guarding and no CVA tenderness. No hernia. Hernia confirmed negative in the right inguinal area and confirmed negative in the left inguinal area.   Musculoskeletal: Normal range of motion. She exhibits tenderness (multiple joints). She exhibits no edema.        Lumbar back: She exhibits pain.       Vascular Status -  Her exam exhibits right foot vasculature normal. Her exam exhibits no right foot edema. Her exam exhibits left foot vasculature normal. Her exam exhibits no left foot edema.   Skin Integrity  -  Her right foot skin is intact.     Marlyn 's left foot skin is intact. .  Lymphadenopathy:     She has no cervical adenopathy.     She has no axillary adenopathy.        Right: No inguinal and no supraclavicular adenopathy present.        Left: No inguinal and no supraclavicular adenopathy present.   Neurological: She is alert and oriented to person, place, and time. She has normal strength. No cranial nerve deficit or sensory deficit. She exhibits normal muscle tone. She displays a negative Romberg sign. Coordination normal.   Skin: Skin is warm, dry and intact. No cyanosis. Nails show no clubbing.   Psychiatric: Her speech is normal and behavior is normal. Judgment and thought content normal. Her mood appears anxious. Cognition and memory are normal. She exhibits a depressed  mood.   Nursing note and vitals reviewed.      Assessment/Plan   Diagnoses and all orders for this visit:    Medicare annual wellness visit, initial  -     Lipid Panel With LDL / HDL Ratio  -     Hemoglobin A1c  -     MicroAlbumin, Urine, Random  -     T4, Free  -     TSH  -     CBC & Differential  -     Comprehensive Metabolic Panel  -     Urinalysis With Microscopic    IDDM (insulin dependent diabetes mellitus)  -     Lipid Panel With LDL / HDL Ratio  -     Hemoglobin A1c  -     MicroAlbumin, Urine, Random  -     T4, Free  -     TSH  -     CBC & Differential  -     Comprehensive Metabolic Panel  -     Urinalysis With Microscopic    Other hyperlipidemia  -     Lipid Panel With LDL / HDL Ratio  -     Hemoglobin A1c  -     MicroAlbumin, Urine, Random  -     T4, Free  -     TSH  -     CBC & Differential  -     Comprehensive Metabolic Panel  -     Urinalysis With Microscopic    Nonalcoholic fatty liver disease  -     Lipid Panel With LDL / HDL Ratio  -     Hemoglobin A1c  -     MicroAlbumin, Urine, Random  -     T4, Free  -     TSH  -     CBC & Differential  -     Comprehensive Metabolic Panel  -     Urinalysis With Microscopic    Cobalamin deficiency  -     Lipid Panel With LDL / HDL Ratio  -     Hemoglobin A1c  -     MicroAlbumin, Urine, Random  -     T4, Free  -     TSH  -     CBC & Differential  -     Comprehensive Metabolic Panel  -     Urinalysis With Microscopic    Vitamin D deficiency  -     Lipid Panel With LDL / HDL Ratio  -     Hemoglobin A1c  -     MicroAlbumin, Urine, Random  -     T4, Free  -     TSH  -     CBC & Differential  -     Comprehensive Metabolic Panel  -     Urinalysis With Microscopic    Slow transit constipation  -     Lipid Panel With LDL / HDL Ratio  -     Hemoglobin A1c  -     MicroAlbumin, Urine, Random  -     T4, Free  -     TSH  -     CBC & Differential  -     Comprehensive Metabolic Panel  -     Urinalysis With Microscopic    Generalized osteoarthritis  -     Lipid Panel With LDL / HDL  Ratio  -     Hemoglobin A1c  -     MicroAlbumin, Urine, Random  -     T4, Free  -     TSH  -     CBC & Differential  -     Comprehensive Metabolic Panel  -     Urinalysis With Microscopic    Generalized anxiety disorder  -     Lipid Panel With LDL / HDL Ratio  -     Hemoglobin A1c  -     MicroAlbumin, Urine, Random  -     T4, Free  -     TSH  -     CBC & Differential  -     Comprehensive Metabolic Panel  -     Urinalysis With Microscopic    Acute cystitis without hematuria  -     Urine Culture    Other orders  -     amLODIPine-benazepril (LOTREL 5-20) 5-20 MG per capsule; Take 1 capsule by mouth Daily.  -     Microscopic Examination      Medicare annual wellness visit initial following recommendations  Generalized osteoporosis supportive meds physical therapy  Generalized anxiety disorder supportive meds and therapy as needed  Acute cystitis supportive meds for this  Slow transit constipation noted  Vitamin D deficiency supplementation as needed  Cobalamin deficiency supplementation as needed  Nonalcoholic fatty liver disease follow closely  Hyperlipidemia keep LDL less than 70 with proper diet exercise medication  Insulin-dependent diabetes mellitus follow hemoglobin A1c with yearly ophthalmology visits

## 2017-05-01 RX ORDER — NITROFURANTOIN 25; 75 MG/1; MG/1
100 CAPSULE ORAL 2 TIMES DAILY
Qty: 16 CAPSULE | Refills: 0 | Status: SHIPPED | OUTPATIENT
Start: 2017-05-01 | End: 2017-06-29

## 2017-05-12 RX ORDER — FENOFIBRATE 160 MG/1
TABLET ORAL
Qty: 90 TABLET | Refills: 0 | Status: SHIPPED | OUTPATIENT
Start: 2017-05-12 | End: 2017-08-13 | Stop reason: SDUPTHER

## 2017-05-17 RX ORDER — HYDROCODONE BITARTRATE AND ACETAMINOPHEN 10; 325 MG/1; MG/1
1 TABLET ORAL EVERY 4 HOURS PRN
Qty: 180 TABLET | Refills: 0 | Status: SHIPPED | OUTPATIENT
Start: 2017-05-17 | End: 2017-06-13 | Stop reason: SDUPTHER

## 2017-05-31 RX ORDER — ALPRAZOLAM 1 MG/1
TABLET ORAL
Qty: 120 TABLET | Refills: 0 | OUTPATIENT
Start: 2017-05-31 | End: 2017-07-26 | Stop reason: SDUPTHER

## 2017-06-14 DIAGNOSIS — Z79.899 CONTROLLED SUBSTANCE AGREEMENT SIGNED: Primary | ICD-10-CM

## 2017-06-14 RX ORDER — HYDROCODONE BITARTRATE AND ACETAMINOPHEN 10; 325 MG/1; MG/1
1 TABLET ORAL EVERY 4 HOURS PRN
Qty: 180 TABLET | Refills: 0 | Status: SHIPPED | OUTPATIENT
Start: 2017-06-14 | End: 2017-06-29 | Stop reason: SDUPTHER

## 2017-06-29 ENCOUNTER — OFFICE VISIT (OUTPATIENT)
Dept: INTERNAL MEDICINE | Facility: CLINIC | Age: 67
End: 2017-06-29

## 2017-06-29 VITALS
RESPIRATION RATE: 16 BRPM | HEART RATE: 91 BPM | WEIGHT: 168 LBS | BODY MASS INDEX: 27.99 KG/M2 | DIASTOLIC BLOOD PRESSURE: 76 MMHG | HEIGHT: 65 IN | OXYGEN SATURATION: 91 % | SYSTOLIC BLOOD PRESSURE: 144 MMHG | TEMPERATURE: 98.3 F

## 2017-06-29 DIAGNOSIS — M75.02 ADHESIVE CAPSULITIS OF LEFT SHOULDER: ICD-10-CM

## 2017-06-29 DIAGNOSIS — M15.9 GENERALIZED OSTEOARTHRITIS: ICD-10-CM

## 2017-06-29 DIAGNOSIS — M54.42 CHRONIC MIDLINE LOW BACK PAIN WITH LEFT-SIDED SCIATICA: ICD-10-CM

## 2017-06-29 DIAGNOSIS — Z79.899 CONTROLLED SUBSTANCE AGREEMENT SIGNED: ICD-10-CM

## 2017-06-29 DIAGNOSIS — K76.0 NONALCOHOLIC FATTY LIVER DISEASE: ICD-10-CM

## 2017-06-29 DIAGNOSIS — F41.1 GENERALIZED ANXIETY DISORDER: ICD-10-CM

## 2017-06-29 DIAGNOSIS — G89.29 CHRONIC MIDLINE LOW BACK PAIN WITH LEFT-SIDED SCIATICA: ICD-10-CM

## 2017-06-29 DIAGNOSIS — IMO0001 IDDM (INSULIN DEPENDENT DIABETES MELLITUS): Primary | ICD-10-CM

## 2017-06-29 PROCEDURE — 99214 OFFICE O/P EST MOD 30 MIN: CPT | Performed by: INTERNAL MEDICINE

## 2017-06-29 RX ORDER — OMEPRAZOLE 20 MG/1
40 CAPSULE, DELAYED RELEASE ORAL DAILY
Qty: 90 CAPSULE | Refills: 1 | Status: SHIPPED | OUTPATIENT
Start: 2017-06-29 | End: 2017-12-22 | Stop reason: SDUPTHER

## 2017-06-29 RX ORDER — HYDROCODONE BITARTRATE AND ACETAMINOPHEN 10; 325 MG/1; MG/1
1 TABLET ORAL EVERY 6 HOURS PRN
Qty: 120 TABLET | Refills: 0 | Status: SHIPPED | OUTPATIENT
Start: 2017-06-29 | End: 2017-08-15 | Stop reason: SDUPTHER

## 2017-06-29 RX ORDER — ATORVASTATIN CALCIUM 80 MG/1
80 TABLET, FILM COATED ORAL DAILY
Qty: 90 TABLET | Refills: 3 | Status: SHIPPED | OUTPATIENT
Start: 2017-06-29 | End: 2018-07-19 | Stop reason: SDUPTHER

## 2017-06-30 RX ORDER — HYDROCODONE BITARTRATE AND ACETAMINOPHEN 10; 325 MG/1; MG/1
1 TABLET ORAL EVERY 6 HOURS PRN
Qty: 180 TABLET | Refills: 0 | Status: CANCELLED | OUTPATIENT
Start: 2017-06-30

## 2017-06-30 NOTE — PROGRESS NOTES
Subjective   Marlyn Rosario is a 67 y.o. female.   She is here today for ID DM along with adhesive capsulitis of left shoulder generalized osteoarthritis nonalcoholic fatty liver disease generalized anxiety disorder chronic low back pain control substance agreement signed  History of Present Illness   She is here today for NIDDM along with adhesive capsulitis of left shoulder generalized osteoarthritis done alcoholic fatty liver disease generalized anxiety disorder chronic low back pain and control substance agreement signed  The following portions of the patient's history were reviewed and updated as appropriate: allergies, current medications, past family history, past medical history, past social history, past surgical history and problem list.    Review of Systems   Musculoskeletal: Positive for arthralgias and back pain.   Psychiatric/Behavioral: Positive for dysphoric mood. The patient is nervous/anxious.    All other systems reviewed and are negative.      Objective   Physical Exam   Constitutional: She is oriented to person, place, and time. She appears well-developed and well-nourished. She is cooperative.   HENT:   Head: Normocephalic and atraumatic.   Right Ear: Hearing, tympanic membrane, external ear and ear canal normal.   Left Ear: Hearing, tympanic membrane, external ear and ear canal normal.   Nose: Nose normal.   Mouth/Throat: Uvula is midline, oropharynx is clear and moist and mucous membranes are normal.   Eyes: Conjunctivae, EOM and lids are normal. Pupils are equal, round, and reactive to light.   Neck: Phonation normal. Neck supple. Carotid bruit is not present.   Cardiovascular: Normal rate, regular rhythm and normal heart sounds.  Exam reveals no gallop and no friction rub.    No murmur heard.  Pulmonary/Chest: Effort normal and breath sounds normal. No respiratory distress.   Abdominal: Soft. Bowel sounds are normal. She exhibits no distension and no mass. There is no hepatosplenomegaly.  There is no tenderness. There is no rebound and no guarding. No hernia.   Musculoskeletal: She exhibits tenderness (ultiple joints). She exhibits no edema.        Left shoulder: She exhibits decreased range of motion, tenderness and pain.        Lumbar back: She exhibits pain.   Neurological: She is alert and oriented to person, place, and time. Coordination and gait normal.   Skin: Skin is warm and dry.   Psychiatric: Her speech is normal and behavior is normal. Judgment and thought content normal. Her mood appears anxious. She exhibits a depressed mood.   Nursing note and vitals reviewed.      Assessment/Plan   Diagnoses and all orders for this visit:    IDDM (insulin dependent diabetes mellitus)    Adhesive capsulitis of left shoulder    Generalized osteoarthritis    Nonalcoholic fatty liver disease    Generalized anxiety disorder    Chronic midline low back pain with left-sided sciatica  -     Ambulatory Referral to Pain Management    Controlled substance agreement signed    Other orders  -     HYDROcodone-acetaminophen (NORCO)  MG per tablet; Take 1 tablet by mouth Every 6 (Six) Hours As Needed for Moderate Pain (4-6).  -     omeprazole (priLOSEC) 20 MG capsule; Take 2 capsules by mouth Daily.  -     atorvastatin (LIPITOR) 80 MG tablet; Take 1 tablet by mouth Daily.      Insulin-dependent diabetes follow hemoglobin A1 C with yearly ophthalmology visits  Adhesive capsulitis of left shoulder physical therapy  Generalized osteoporosis supportive meds physical therapy  Nonalcoholic failure liver disease noted  Gen. his anxiety disorder supportive meds and therapy  Chronic midline low back pain with left-sided sciatica referral to pain management  Control substance cream and signed today for hydrocodone which will be chronic for her for the rest of her life or we will get her to pain management to see what else they can do for

## 2017-07-21 RX ORDER — HYDROCHLOROTHIAZIDE 25 MG/1
TABLET ORAL
Qty: 90 TABLET | Refills: 0 | Status: SHIPPED | OUTPATIENT
Start: 2017-07-21 | End: 2017-10-03 | Stop reason: SDUPTHER

## 2017-07-26 RX ORDER — ALPRAZOLAM 1 MG/1
TABLET ORAL
Qty: 120 TABLET | Refills: 0 | OUTPATIENT
Start: 2017-07-26 | End: 2017-09-21 | Stop reason: SDUPTHER

## 2017-08-14 RX ORDER — FENOFIBRATE 160 MG/1
TABLET ORAL
Qty: 90 TABLET | Refills: 0 | Status: SHIPPED | OUTPATIENT
Start: 2017-08-14 | End: 2018-01-17 | Stop reason: SDUPTHER

## 2017-08-15 RX ORDER — HYDROCODONE BITARTRATE AND ACETAMINOPHEN 10; 325 MG/1; MG/1
1 TABLET ORAL EVERY 6 HOURS PRN
Qty: 120 TABLET | Refills: 0 | Status: SHIPPED | OUTPATIENT
Start: 2017-08-15 | End: 2017-09-15 | Stop reason: SDUPTHER

## 2017-09-15 RX ORDER — HYDROCODONE BITARTRATE AND ACETAMINOPHEN 10; 325 MG/1; MG/1
1 TABLET ORAL EVERY 6 HOURS PRN
Qty: 120 TABLET | Refills: 0 | Status: SHIPPED | OUTPATIENT
Start: 2017-09-15 | End: 2018-06-17

## 2017-09-21 RX ORDER — ALPRAZOLAM 1 MG/1
TABLET ORAL
Qty: 120 TABLET | Refills: 0 | Status: SHIPPED | OUTPATIENT
Start: 2017-09-21 | End: 2017-11-21 | Stop reason: SDUPTHER

## 2017-10-02 ENCOUNTER — OFFICE VISIT (OUTPATIENT)
Dept: ORTHOPEDIC SURGERY | Facility: CLINIC | Age: 67
End: 2017-10-02

## 2017-10-02 VITALS — TEMPERATURE: 98.6 F | HEIGHT: 65 IN

## 2017-10-02 DIAGNOSIS — M19.90 ARTHRITIS: Primary | ICD-10-CM

## 2017-10-02 PROCEDURE — 20610 DRAIN/INJ JOINT/BURSA W/O US: CPT | Performed by: ORTHOPAEDIC SURGERY

## 2017-10-02 RX ORDER — METHYLPREDNISOLONE ACETATE 80 MG/ML
80 INJECTION, SUSPENSION INTRA-ARTICULAR; INTRALESIONAL; INTRAMUSCULAR; SOFT TISSUE
Status: COMPLETED | OUTPATIENT
Start: 2017-10-02 | End: 2017-10-02

## 2017-10-02 RX ORDER — BUPIVACAINE HYDROCHLORIDE 5 MG/ML
4 INJECTION, SOLUTION EPIDURAL; INTRACAUDAL
Status: COMPLETED | OUTPATIENT
Start: 2017-10-02 | End: 2017-10-02

## 2017-10-02 RX ADMIN — METHYLPREDNISOLONE ACETATE 80 MG: 80 INJECTION, SUSPENSION INTRA-ARTICULAR; INTRALESIONAL; INTRAMUSCULAR; SOFT TISSUE at 14:12

## 2017-10-02 RX ADMIN — BUPIVACAINE HYDROCHLORIDE 4 ML: 5 INJECTION, SOLUTION EPIDURAL; INTRACAUDAL at 14:12

## 2017-10-02 NOTE — PROGRESS NOTES
"Shoulder Injection Glenohumeral      Patient: Marlyn Rosario        YOB: 1950            Chief Complaints: Left Shoulder pain  Chief Complaint   Patient presents with   • Left Shoulder - Follow-up           History of Present Illness: Pt gets intermittent shoulder injections with good relief. Is here for repeat injection.      Physical Exam: 67 y.o. female  General Appearance:    Alert, cooperative, in no acute distress                   Vitals:    10/02/17 1411   Temp: 98.6 °F (37 °C)   TempSrc: Temporal Artery    Height: 65\" (165.1 cm)      Patient is alert and read ×3 no acute distress appears her above-listed at height weight and age.  Affect is normal respiratory rate is normal unlabored. Heart rate regular rate rhythm, sclera, dentition and hearing are normal for the purpose of this exam.  Exam and complaints are unchanged.      Procedure:  Large Joint Arthrocentesis  Date/Time: 10/2/2017 2:12 PM  Consent given by: patient  Site marked: site marked  Timeout: Immediately prior to procedure a time out was called to verify the correct patient, procedure, equipment, support staff and site/side marked as required   Supporting Documentation  Indications: pain   Procedure Details  Location: shoulder - L glenohumeral  Preparation: Patient was prepped and draped in the usual sterile fashion  Needle size: 25 G  Approach: posterior  Medications administered: 4 mL bupivacaine (PF) 0.5 %; 80 mg methylPREDNISolone acetate 80 MG/ML  Patient tolerance: patient tolerated the procedure well with no immediate complications                Assessment. Persistent shoulder pain with glenohumeral arthritis          Plan: Is to proceed with injection    The glenohumeral was injected under strict sterile technique is form on a Marcaine and Depo-Medrol this was done sterilely and tolerated tolerated  well            "

## 2017-10-03 RX ORDER — HYDROCHLOROTHIAZIDE 25 MG/1
TABLET ORAL
Qty: 90 TABLET | Refills: 0 | Status: SHIPPED | OUTPATIENT
Start: 2017-10-03 | End: 2018-01-08 | Stop reason: SDUPTHER

## 2017-10-23 ENCOUNTER — OFFICE VISIT (OUTPATIENT)
Dept: INTERNAL MEDICINE | Facility: CLINIC | Age: 67
End: 2017-10-23

## 2017-10-23 VITALS
OXYGEN SATURATION: 94 % | BODY MASS INDEX: 26.92 KG/M2 | SYSTOLIC BLOOD PRESSURE: 120 MMHG | DIASTOLIC BLOOD PRESSURE: 77 MMHG | HEIGHT: 65 IN | WEIGHT: 161.6 LBS | HEART RATE: 89 BPM | TEMPERATURE: 98.8 F

## 2017-10-23 DIAGNOSIS — E11.42 TYPE 2 DIABETES MELLITUS WITH DIABETIC POLYNEUROPATHY, WITH LONG-TERM CURRENT USE OF INSULIN (HCC): ICD-10-CM

## 2017-10-23 DIAGNOSIS — E78.49 OTHER HYPERLIPIDEMIA: ICD-10-CM

## 2017-10-23 DIAGNOSIS — Z23 INFLUENZA VACCINE NEEDED: Primary | ICD-10-CM

## 2017-10-23 DIAGNOSIS — K59.01 SLOW TRANSIT CONSTIPATION: ICD-10-CM

## 2017-10-23 DIAGNOSIS — F41.1 GENERALIZED ANXIETY DISORDER: ICD-10-CM

## 2017-10-23 DIAGNOSIS — K76.0 NONALCOHOLIC FATTY LIVER DISEASE: ICD-10-CM

## 2017-10-23 DIAGNOSIS — M15.9 GENERALIZED OSTEOARTHRITIS: ICD-10-CM

## 2017-10-23 DIAGNOSIS — M54.42 CHRONIC MIDLINE LOW BACK PAIN WITH LEFT-SIDED SCIATICA: ICD-10-CM

## 2017-10-23 DIAGNOSIS — G89.29 CHRONIC MIDLINE LOW BACK PAIN WITH LEFT-SIDED SCIATICA: ICD-10-CM

## 2017-10-23 DIAGNOSIS — Z79.4 TYPE 2 DIABETES MELLITUS WITH DIABETIC POLYNEUROPATHY, WITH LONG-TERM CURRENT USE OF INSULIN (HCC): ICD-10-CM

## 2017-10-23 PROCEDURE — 90471 IMMUNIZATION ADMIN: CPT | Performed by: INTERNAL MEDICINE

## 2017-10-23 PROCEDURE — 90662 IIV NO PRSV INCREASED AG IM: CPT | Performed by: INTERNAL MEDICINE

## 2017-10-23 PROCEDURE — 99214 OFFICE O/P EST MOD 30 MIN: CPT | Performed by: INTERNAL MEDICINE

## 2017-10-24 LAB
ALBUMIN SERPL-MCNC: 4.7 G/DL (ref 3.5–5.2)
ALBUMIN/GLOB SERPL: 2 G/DL
ALP SERPL-CCNC: 76 U/L (ref 39–117)
ALT SERPL-CCNC: 24 U/L (ref 1–33)
APPEARANCE UR: (no result)
AST SERPL-CCNC: 28 U/L (ref 1–32)
BACTERIA #/AREA URNS HPF: ABNORMAL /HPF
BASOPHILS # BLD AUTO: 0.03 10*3/MM3 (ref 0–0.2)
BASOPHILS NFR BLD AUTO: 0.4 % (ref 0–1.5)
BILIRUB SERPL-MCNC: 0.3 MG/DL (ref 0.1–1.2)
BILIRUB UR QL STRIP: NEGATIVE
BUN SERPL-MCNC: 10 MG/DL (ref 8–23)
BUN/CREAT SERPL: 14.5 (ref 7–25)
CALCIUM SERPL-MCNC: 9.8 MG/DL (ref 8.6–10.5)
CASTS URNS MICRO: ABNORMAL
CHLORIDE SERPL-SCNC: 92 MMOL/L (ref 98–107)
CO2 SERPL-SCNC: 32 MMOL/L (ref 22–29)
COLOR UR: YELLOW
CREAT SERPL-MCNC: 0.69 MG/DL (ref 0.57–1)
EOSINOPHIL # BLD AUTO: 0.36 10*3/MM3 (ref 0–0.7)
EOSINOPHIL NFR BLD AUTO: 4.3 % (ref 0.3–6.2)
EPI CELLS #/AREA URNS HPF: ABNORMAL /HPF
ERYTHROCYTE [DISTWIDTH] IN BLOOD BY AUTOMATED COUNT: 13.1 % (ref 11.7–13)
GFR SERPLBLD CREATININE-BSD FMLA CKD-EPI: 103 ML/MIN/1.73
GFR SERPLBLD CREATININE-BSD FMLA CKD-EPI: 85 ML/MIN/1.73
GLOBULIN SER CALC-MCNC: 2.4 GM/DL
GLUCOSE SERPL-MCNC: 179 MG/DL (ref 65–99)
GLUCOSE UR QL: (no result)
HBA1C MFR BLD: 8.8 % (ref 4.8–5.6)
HCT VFR BLD AUTO: 40.5 % (ref 35.6–45.5)
HGB BLD-MCNC: 13.2 G/DL (ref 11.9–15.5)
HGB UR QL STRIP: NEGATIVE
IMM GRANULOCYTES # BLD: 0.02 10*3/MM3 (ref 0–0.03)
IMM GRANULOCYTES NFR BLD: 0.2 % (ref 0–0.5)
KETONES UR QL STRIP: NEGATIVE
LEUKOCYTE ESTERASE UR QL STRIP: (no result)
LYMPHOCYTES # BLD AUTO: 2.95 10*3/MM3 (ref 0.9–4.8)
LYMPHOCYTES NFR BLD AUTO: 35.1 % (ref 19.6–45.3)
MCH RBC QN AUTO: 30.3 PG (ref 26.9–32)
MCHC RBC AUTO-ENTMCNC: 32.6 G/DL (ref 32.4–36.3)
MCV RBC AUTO: 93.1 FL (ref 80.5–98.2)
MICROALBUMIN UR-MCNC: 8.7 UG/ML
MONOCYTES # BLD AUTO: 0.58 10*3/MM3 (ref 0.2–1.2)
MONOCYTES NFR BLD AUTO: 6.9 % (ref 5–12)
NEUTROPHILS # BLD AUTO: 4.47 10*3/MM3 (ref 1.9–8.1)
NEUTROPHILS NFR BLD AUTO: 53.1 % (ref 42.7–76)
NITRITE UR QL STRIP: POSITIVE
PH UR STRIP: 6 [PH] (ref 5–8)
PLATELET # BLD AUTO: 303 10*3/MM3 (ref 140–500)
POTASSIUM SERPL-SCNC: 3.9 MMOL/L (ref 3.5–5.2)
PROT SERPL-MCNC: 7.1 G/DL (ref 6–8.5)
PROT UR QL STRIP: NEGATIVE
RBC # BLD AUTO: 4.35 10*6/MM3 (ref 3.9–5.2)
RBC #/AREA URNS HPF: ABNORMAL /HPF
SODIUM SERPL-SCNC: 139 MMOL/L (ref 136–145)
SP GR UR: 1.02 (ref 1–1.03)
T4 FREE SERPL-MCNC: 0.98 NG/DL (ref 0.93–1.7)
TSH SERPL DL<=0.005 MIU/L-ACNC: 1.51 MIU/ML (ref 0.27–4.2)
UROBILINOGEN UR STRIP-MCNC: (no result) MG/DL
WBC # BLD AUTO: 8.41 10*3/MM3 (ref 4.5–10.7)
WBC #/AREA URNS HPF: ABNORMAL /HPF

## 2017-10-30 NOTE — PROGRESS NOTES
Subjective   Marlyn Rosario is a 67 y.o. female.   She is here today for influenza vaccine needed hyperlipidemia slow transit constipation nonalcoholic fatty liver disease type 2 diabetes with diabetic polyneuropathy with long-term use of insulin chronic midline low back pain with left-sided sciatica generalized osteoarthritis generalized anxiety disorder  History of Present Illness   She is here today for fluids vaccine needed along with hyperlipidemia slow transit constipation nonalcoholic fatty liver disease type 2 diabetes with diabetic polyneuropathy with long-term use of insulin chronic midline low back pain with left-sided sciatica along with osteoarthritis of both sides and generalized anxiety disorder  The following portions of the patient's history were reviewed and updated as appropriate: allergies, current medications, past family history, past medical history, past social history, past surgical history and problem list.    Review of Systems   Musculoskeletal: Positive for arthralgias and back pain.   Neurological: Positive for numbness.   Psychiatric/Behavioral: The patient is nervous/anxious.    All other systems reviewed and are negative.      Objective   Physical Exam   Constitutional: She is oriented to person, place, and time. She appears well-developed and well-nourished. She is cooperative.   HENT:   Head: Normocephalic and atraumatic.   Right Ear: Hearing, tympanic membrane, external ear and ear canal normal.   Left Ear: Hearing, tympanic membrane, external ear and ear canal normal.   Nose: Nose normal.   Mouth/Throat: Uvula is midline, oropharynx is clear and moist and mucous membranes are normal.   Eyes: Conjunctivae, EOM and lids are normal. Pupils are equal, round, and reactive to light.   Neck: Phonation normal. Neck supple. Carotid bruit is not present.   Cardiovascular: Normal rate, regular rhythm and normal heart sounds.  Exam reveals no gallop and no friction rub.    No murmur  heard.  Pulmonary/Chest: Effort normal and breath sounds normal. No respiratory distress.   Abdominal: Soft. Bowel sounds are normal. She exhibits no distension and no mass. There is no hepatosplenomegaly. There is no tenderness. There is no rebound and no guarding. No hernia.   Musculoskeletal: She exhibits tenderness (multiple joints). She exhibits no edema.        Lumbar back: She exhibits pain (ith left-sided sciatica).    Marlyn had a diabetic foot exam performed today.   During the foot exam she had a monofilament test performed (neuropathy).    Vascular Status -  Her exam exhibits right foot vasculature normal. Her exam exhibits no right foot edema. Her exam exhibits left foot vasculature normal. Her exam exhibits no left foot edema.   Skin Integrity  -  Her right foot skin is intact.     Marlyn 's left foot skin is intact. .  Neurological: She is alert and oriented to person, place, and time. Coordination and gait normal.   Skin: Skin is warm and dry.   Psychiatric: Her speech is normal and behavior is normal. Judgment and thought content normal. Her mood appears anxious. She exhibits a depressed mood.   Nursing note and vitals reviewed.      Assessment/Plan   Diagnoses and all orders for this visit:    Influenza vaccine needed  -     Flu Vaccine High Dose PF 65YR+  -     Hemoglobin A1c  -     CBC & Differential  -     Comprehensive Metabolic Panel  -     T4, Free  -     TSH  -     Urinalysis With Microscopic - Urine, Clean Catch  -     MicroAlbumin, Urine, Random - Urine, Clean Catch    Other hyperlipidemia  -     Hemoglobin A1c  -     CBC & Differential  -     Comprehensive Metabolic Panel  -     T4, Free  -     TSH  -     Urinalysis With Microscopic - Urine, Clean Catch  -     MicroAlbumin, Urine, Random - Urine, Clean Catch    Slow transit constipation  -     Hemoglobin A1c  -     CBC & Differential  -     Comprehensive Metabolic Panel  -     T4, Free  -     TSH  -     Urinalysis With Microscopic -  Urine, Clean Catch  -     MicroAlbumin, Urine, Random - Urine, Clean Catch    Nonalcoholic fatty liver disease  -     Hemoglobin A1c  -     CBC & Differential  -     Comprehensive Metabolic Panel  -     T4, Free  -     TSH  -     Urinalysis With Microscopic - Urine, Clean Catch  -     MicroAlbumin, Urine, Random - Urine, Clean Catch    Type 2 diabetes mellitus with diabetic polyneuropathy, with long-term current use of insulin  -     Hemoglobin A1c  -     CBC & Differential  -     Comprehensive Metabolic Panel  -     T4, Free  -     TSH  -     Urinalysis With Microscopic - Urine, Clean Catch  -     MicroAlbumin, Urine, Random - Urine, Clean Catch    Chronic midline low back pain with left-sided sciatica  -     Hemoglobin A1c  -     CBC & Differential  -     Comprehensive Metabolic Panel  -     T4, Free  -     TSH  -     Urinalysis With Microscopic - Urine, Clean Catch  -     MicroAlbumin, Urine, Random - Urine, Clean Catch    Generalized osteoarthritis  -     Hemoglobin A1c  -     CBC & Differential  -     Comprehensive Metabolic Panel  -     T4, Free  -     TSH  -     Urinalysis With Microscopic - Urine, Clean Catch  -     MicroAlbumin, Urine, Random - Urine, Clean Catch    Generalized anxiety disorder  -     Hemoglobin A1c  -     CBC & Differential  -     Comprehensive Metabolic Panel  -     T4, Free  -     TSH  -     Urinalysis With Microscopic - Urine, Clean Catch  -     MicroAlbumin, Urine, Random - Urine, Clean Catch    Other orders  -     Microscopic Examination      Influenza vaccine needed noted  Chronic low back pain with left-sided sciatica supportive meds and physical therapy  Generalized osteoarthritis supportive meds physical therapy  Generalized anxiety disorder continue current alprazolam and psychological therapy as needed  Type 2 diabetes with diabetic polyneuropathy with long-term use of insulin noted and follow hemoglobin A1c with yearly ophthalmology visits  Nonalcoholic fatty liver disease  weight loss as beneficial  Slow transit constipation utilization of Linzess which she is doing  Hyperlipidemia keep LDL less than 70 with proper diet exercise medication and in this case Lipitor which she is tolerating well

## 2017-11-02 RX ORDER — NITROFURANTOIN 25; 75 MG/1; MG/1
100 CAPSULE ORAL 2 TIMES DAILY
Qty: 20 CAPSULE | Refills: 0 | Status: SHIPPED | OUTPATIENT
Start: 2017-11-02 | End: 2018-05-01 | Stop reason: SDUPTHER

## 2017-11-21 RX ORDER — ALPRAZOLAM 1 MG/1
1 TABLET ORAL 4 TIMES DAILY
Qty: 120 TABLET | Refills: 0 | OUTPATIENT
Start: 2017-11-21 | End: 2017-12-19 | Stop reason: SDUPTHER

## 2017-12-19 RX ORDER — ALPRAZOLAM 1 MG/1
TABLET ORAL
Qty: 120 TABLET | Refills: 0 | Status: SHIPPED | OUTPATIENT
Start: 2017-12-19 | End: 2018-02-16 | Stop reason: SDUPTHER

## 2017-12-20 RX ORDER — ALPRAZOLAM 1 MG/1
TABLET ORAL
Qty: 120 TABLET | Refills: 0 | OUTPATIENT
Start: 2017-12-20

## 2017-12-22 RX ORDER — OMEPRAZOLE 20 MG/1
CAPSULE, DELAYED RELEASE ORAL
Qty: 90 CAPSULE | Refills: 2 | Status: SHIPPED | OUTPATIENT
Start: 2017-12-22 | End: 2018-11-30 | Stop reason: SDUPTHER

## 2018-01-09 RX ORDER — HYDROCHLOROTHIAZIDE 25 MG/1
TABLET ORAL
Qty: 90 TABLET | Refills: 0 | Status: SHIPPED | OUTPATIENT
Start: 2018-01-09 | End: 2018-01-17 | Stop reason: SDUPTHER

## 2018-01-17 RX ORDER — FENOFIBRATE 160 MG/1
TABLET ORAL
Qty: 90 TABLET | Refills: 0 | Status: SHIPPED | OUTPATIENT
Start: 2018-01-17 | End: 2018-06-17

## 2018-01-17 RX ORDER — HYDROCHLOROTHIAZIDE 25 MG/1
TABLET ORAL
Qty: 90 TABLET | Refills: 0 | Status: SHIPPED | OUTPATIENT
Start: 2018-01-17 | End: 2018-05-23

## 2018-02-16 RX ORDER — ALPRAZOLAM 1 MG/1
TABLET ORAL
Qty: 120 TABLET | Refills: 0 | Status: SHIPPED | OUTPATIENT
Start: 2018-02-16 | End: 2018-03-15 | Stop reason: SDUPTHER

## 2018-03-15 RX ORDER — ALPRAZOLAM 1 MG/1
TABLET ORAL
Qty: 120 TABLET | Refills: 0 | OUTPATIENT
Start: 2018-03-15 | End: 2018-04-18 | Stop reason: SDUPTHER

## 2018-04-18 RX ORDER — ALPRAZOLAM 1 MG/1
TABLET ORAL
Qty: 120 TABLET | Refills: 0 | OUTPATIENT
Start: 2018-04-18 | End: 2018-06-18 | Stop reason: SDUPTHER

## 2018-04-23 ENCOUNTER — OFFICE VISIT (OUTPATIENT)
Dept: INTERNAL MEDICINE | Facility: CLINIC | Age: 68
End: 2018-04-23

## 2018-04-23 ENCOUNTER — RESULTS ENCOUNTER (OUTPATIENT)
Dept: INTERNAL MEDICINE | Facility: CLINIC | Age: 68
End: 2018-04-23

## 2018-04-23 VITALS
SYSTOLIC BLOOD PRESSURE: 123 MMHG | BODY MASS INDEX: 28.09 KG/M2 | HEIGHT: 65 IN | HEART RATE: 91 BPM | DIASTOLIC BLOOD PRESSURE: 65 MMHG | WEIGHT: 168.6 LBS | TEMPERATURE: 97.6 F | OXYGEN SATURATION: 92 %

## 2018-04-23 DIAGNOSIS — G89.29 CHRONIC MIDLINE LOW BACK PAIN WITH LEFT-SIDED SCIATICA: ICD-10-CM

## 2018-04-23 DIAGNOSIS — E55.9 VITAMIN D DEFICIENCY: ICD-10-CM

## 2018-04-23 DIAGNOSIS — R30.0 DYSURIA: ICD-10-CM

## 2018-04-23 DIAGNOSIS — E11.42 TYPE 2 DIABETES MELLITUS WITH DIABETIC POLYNEUROPATHY, WITH LONG-TERM CURRENT USE OF INSULIN (HCC): ICD-10-CM

## 2018-04-23 DIAGNOSIS — Z79.4 TYPE 2 DIABETES MELLITUS WITH DIABETIC POLYNEUROPATHY, WITH LONG-TERM CURRENT USE OF INSULIN (HCC): ICD-10-CM

## 2018-04-23 DIAGNOSIS — M54.42 CHRONIC MIDLINE LOW BACK PAIN WITH LEFT-SIDED SCIATICA: ICD-10-CM

## 2018-04-23 DIAGNOSIS — Z00.00 MEDICARE ANNUAL WELLNESS VISIT, SUBSEQUENT: Primary | ICD-10-CM

## 2018-04-23 DIAGNOSIS — Z12.31 VISIT FOR SCREENING MAMMOGRAM: ICD-10-CM

## 2018-04-23 DIAGNOSIS — M15.9 GENERALIZED OSTEOARTHRITIS: ICD-10-CM

## 2018-04-23 DIAGNOSIS — E78.2 MIXED HYPERLIPIDEMIA: ICD-10-CM

## 2018-04-23 DIAGNOSIS — E53.8 COBALAMIN DEFICIENCY: ICD-10-CM

## 2018-04-23 DIAGNOSIS — K76.0 NONALCOHOLIC FATTY LIVER DISEASE: ICD-10-CM

## 2018-04-23 PROCEDURE — G0439 PPPS, SUBSEQ VISIT: HCPCS | Performed by: INTERNAL MEDICINE

## 2018-04-23 PROCEDURE — 99214 OFFICE O/P EST MOD 30 MIN: CPT | Performed by: INTERNAL MEDICINE

## 2018-04-26 LAB
APPEARANCE UR: (no result)
BACTERIA #/AREA URNS HPF: ABNORMAL /HPF
BACTERIA UR CULT: ABNORMAL
BACTERIA UR CULT: ABNORMAL
BILIRUB UR QL STRIP: NEGATIVE
CASTS URNS MICRO: ABNORMAL
COLOR UR: YELLOW
EPI CELLS #/AREA URNS HPF: ABNORMAL /HPF
GLUCOSE UR QL: NEGATIVE
HGB UR QL STRIP: (no result)
KETONES UR QL STRIP: NEGATIVE
LEUKOCYTE ESTERASE UR QL STRIP: (no result)
NITRITE UR QL STRIP: POSITIVE
OTHER ANTIBIOTIC SUSC ISLT: ABNORMAL
PH UR STRIP: 6.5 [PH] (ref 5–8)
PROT UR QL STRIP: NEGATIVE
RBC #/AREA URNS HPF: ABNORMAL /HPF
SP GR UR: 1.01 (ref 1–1.03)
UROBILINOGEN UR STRIP-MCNC: (no result) MG/DL
WBC #/AREA URNS HPF: ABNORMAL /HPF

## 2018-04-28 ENCOUNTER — RESULTS ENCOUNTER (OUTPATIENT)
Dept: INTERNAL MEDICINE | Facility: CLINIC | Age: 68
End: 2018-04-28

## 2018-04-28 DIAGNOSIS — E11.42 TYPE 2 DIABETES MELLITUS WITH DIABETIC POLYNEUROPATHY, WITH LONG-TERM CURRENT USE OF INSULIN (HCC): ICD-10-CM

## 2018-04-28 DIAGNOSIS — Z79.4 TYPE 2 DIABETES MELLITUS WITH DIABETIC POLYNEUROPATHY, WITH LONG-TERM CURRENT USE OF INSULIN (HCC): ICD-10-CM

## 2018-04-28 DIAGNOSIS — E78.2 MIXED HYPERLIPIDEMIA: ICD-10-CM

## 2018-05-01 RX ORDER — NITROFURANTOIN 25; 75 MG/1; MG/1
100 CAPSULE ORAL EVERY 12 HOURS SCHEDULED
Qty: 20 CAPSULE | Refills: 0 | Status: SHIPPED | OUTPATIENT
Start: 2018-05-01 | End: 2018-05-23

## 2018-05-02 ENCOUNTER — APPOINTMENT (OUTPATIENT)
Dept: MAMMOGRAPHY | Facility: HOSPITAL | Age: 68
End: 2018-05-02

## 2018-05-05 NOTE — PROGRESS NOTES
Subjective   Marlyn Rosario is a 68 y.o. female.   She is here today for Medicare annual wellness visit subsequent along with follow-up for hyperlipidemia vitamin D deficiency nonalcoholic fatty liver disease cobalamin deficiency type 2 diabetes with diabetic polyneuropathy osteoarthritis chronic low back pain dysuria and visit for screening mammogram as well  History of Present Illness   She is here today for Medicare annual wellness visit subsequent and she is not following recommendations for more exercise along with follow-up for hyperlipidemia which has been stable on Lipitor 80 mg daily vitamin D deficiency which has been stable on supplementation nonalcoholic fatty liver disease which will stabilize more when she loses more weight which she is gaining weight not losing and cobalamin deficiency which has been stable on supplementation and type 2 diabetes with diabetic polyneuropathy which is not stable as well and osteoarthritis in general which is ongoing and tolerable and chronic low back pain which is tolerable on current medication and dysuria which is new and visit for screening mammogram as well  The following portions of the patient's history were reviewed and updated as appropriate: allergies, current medications, past family history, past medical history, past social history, past surgical history and problem list.    Review of Systems   Genitourinary: Positive for dysuria.   Musculoskeletal: Positive for arthralgias and back pain.   All other systems reviewed and are negative.      Objective   Physical Exam   Constitutional: She is oriented to person, place, and time. Vital signs are normal. She appears well-developed and well-nourished. She is active.   HENT:   Head: Normocephalic and atraumatic.   Right Ear: Hearing, tympanic membrane, external ear and ear canal normal.   Left Ear: Hearing, tympanic membrane, external ear and ear canal normal.   Nose: Nose normal.   Mouth/Throat: Uvula is  midline, oropharynx is clear and moist and mucous membranes are normal.   Eyes: Conjunctivae, EOM and lids are normal. Pupils are equal, round, and reactive to light. Right eye exhibits no discharge. Left eye exhibits no discharge.   Neck: Trachea normal, normal range of motion, full passive range of motion without pain and phonation normal. Neck supple. Carotid bruit is not present. No edema present. No thyroid mass and no thyromegaly present.   Cardiovascular: Normal rate, regular rhythm, normal heart sounds, intact distal pulses and normal pulses.  Exam reveals no gallop and no friction rub.    No murmur heard.  Pulmonary/Chest: Effort normal and breath sounds normal. No respiratory distress. She has no wheezes. She has no rales. Right breast exhibits no inverted nipple, no mass, no nipple discharge, no skin change and no tenderness. Left breast exhibits no inverted nipple, no mass, no nipple discharge, no skin change and no tenderness. Breasts are symmetrical. There is no breast swelling.   Abdominal: Soft. Normal appearance, normal aorta and bowel sounds are normal. She exhibits no distension, no abdominal bruit and no mass. There is no hepatosplenomegaly. There is no tenderness. There is no rebound, no guarding and no CVA tenderness. No hernia. Hernia confirmed negative in the right inguinal area and confirmed negative in the left inguinal area.   Genitourinary: No breast tenderness, discharge or bleeding.   Musculoskeletal: Normal range of motion. She exhibits tenderness (multiple joints). She exhibits no edema.        Lumbar back: She exhibits pain.     Vascular Status -  Her right foot exhibits normal foot vasculature  and no edema. Her left foot exhibits normal foot vasculature  and no edema.  Skin Integrity  -  Her right foot skin is intact.Her left foot skin is intact..  Lymphadenopathy:     She has no cervical adenopathy.     She has no axillary adenopathy.        Right: No inguinal and no  supraclavicular adenopathy present.        Left: No inguinal and no supraclavicular adenopathy present.   Neurological: She is alert and oriented to person, place, and time. She has normal strength. No cranial nerve deficit or sensory deficit. She exhibits normal muscle tone. She displays a negative Romberg sign. Coordination normal.   Skin: Skin is warm, dry and intact. No cyanosis. Nails show no clubbing.   Psychiatric: She has a normal mood and affect. Her speech is normal and behavior is normal. Judgment and thought content normal. Cognition and memory are normal.   Nursing note and vitals reviewed.      Assessment/Plan   Diagnoses and all orders for this visit:    Medicare annual wellness visit, subsequent    Mixed hyperlipidemia  -     Lipid Panel With LDL / HDL Ratio; Future  -     T4, Free; Future  -     TSH; Future  -     Comprehensive Metabolic Panel; Future  -     CBC & Differential; Future    Vitamin D deficiency    Nonalcoholic fatty liver disease    Cobalamin deficiency    Type 2 diabetes mellitus with diabetic polyneuropathy, with long-term current use of insulin  -     Cancel: Hemoglobin A1c  -     MicroAlbumin, Urine, Random - Urine, Clean Catch; Future  -     Hemoglobin A1c; Future    Generalized osteoarthritis    Chronic midline low back pain with left-sided sciatica    Dysuria  -     Urinalysis With Microscopic - Urine, Clean Catch  -     Urine Culture - Urine, Urine, Clean Catch    Visit for screening mammogram  -     Mammo Screening Bilateral With CAD; Future    Other orders  -     Microscopic Examination      Medicare annual wellness visit subsequent she is not following recommendations for exercise and we will work with her on that  Vitamin D deficiency has been stable supplementation and we will check levels on a regular basis  Nonalcoholic fatty liver disease she is gaining weight not losing therefore I doubt this will be any better and therefore unstable and she will need to lose weight  and get the liver enzymes down  Cobalamin deficiency has been stable on supplementation and we will check levels on a regular basis  Type 2 diabetes with diabetic polyneuropathy not stable with weight gain especially and we will check A1c with microalbumin  Osteoarthritis in general again weight gain we'll make this worse and she will work with exercise physical therapy and weight loss  Chronic low back pain with left-sided sciatica ongoing and we will try medication for this as well and she will work on stretching exercises  Dysuria we will check urine culture and urinalysis and follow from there  Visit for screening mammogram schedule mammogram  Mixed hyperlipidemia stable on Lipitor as long she works on diet and exercise and continues with her Lipitor

## 2018-05-23 ENCOUNTER — OFFICE VISIT (OUTPATIENT)
Dept: INTERNAL MEDICINE | Facility: CLINIC | Age: 68
End: 2018-05-23

## 2018-05-23 VITALS
TEMPERATURE: 98.1 F | OXYGEN SATURATION: 95 % | HEIGHT: 65 IN | WEIGHT: 164 LBS | HEART RATE: 94 BPM | SYSTOLIC BLOOD PRESSURE: 116 MMHG | BODY MASS INDEX: 27.32 KG/M2 | RESPIRATION RATE: 16 BRPM | DIASTOLIC BLOOD PRESSURE: 75 MMHG

## 2018-05-23 DIAGNOSIS — K13.0 ANGULAR CHEILITIS: ICD-10-CM

## 2018-05-23 DIAGNOSIS — E11.42 TYPE 2 DIABETES MELLITUS WITH DIABETIC POLYNEUROPATHY, UNSPECIFIED LONG TERM INSULIN USE STATUS: ICD-10-CM

## 2018-05-23 DIAGNOSIS — B37.31 CANDIDIASIS OF VAGINA: ICD-10-CM

## 2018-05-23 DIAGNOSIS — R53.83 FATIGUE, UNSPECIFIED TYPE: ICD-10-CM

## 2018-05-23 DIAGNOSIS — N12 PYELONEPHRITIS: ICD-10-CM

## 2018-05-23 DIAGNOSIS — Z09 HOSPITAL DISCHARGE FOLLOW-UP: Primary | ICD-10-CM

## 2018-05-23 PROCEDURE — 99214 OFFICE O/P EST MOD 30 MIN: CPT | Performed by: INTERNAL MEDICINE

## 2018-05-23 RX ORDER — FLUCONAZOLE 150 MG/1
150 TABLET ORAL DAILY
Qty: 8 TABLET | Refills: 0 | Status: SHIPPED | OUTPATIENT
Start: 2018-05-23 | End: 2018-06-17

## 2018-05-23 RX ORDER — HYDROCHLOROTHIAZIDE 12.5 MG/1
12.5 TABLET ORAL
COMMUNITY
Start: 2018-05-06 | End: 2018-06-05

## 2018-05-26 LAB
ALBUMIN SERPL-MCNC: 4.2 G/DL (ref 3.5–5.2)
ALBUMIN/GLOB SERPL: 1.4 G/DL
ALP SERPL-CCNC: 81 U/L (ref 39–117)
ALT SERPL-CCNC: 10 U/L (ref 1–33)
APPEARANCE UR: CLEAR
AST SERPL-CCNC: 15 U/L (ref 1–32)
BACTERIA #/AREA URNS HPF: ABNORMAL /HPF
BACTERIA UR CULT: ABNORMAL
BACTERIA UR CULT: ABNORMAL
BASOPHILS # BLD AUTO: 0.03 10*3/MM3 (ref 0–0.2)
BASOPHILS NFR BLD AUTO: 0.5 % (ref 0–1.5)
BILIRUB SERPL-MCNC: 0.3 MG/DL (ref 0.1–1.2)
BILIRUB UR QL STRIP: NEGATIVE
BUN SERPL-MCNC: 8 MG/DL (ref 8–23)
BUN/CREAT SERPL: 10.1 (ref 7–25)
CALCIUM SERPL-MCNC: 9.3 MG/DL (ref 8.6–10.5)
CASTS URNS MICRO: ABNORMAL
CHLORIDE SERPL-SCNC: 98 MMOL/L (ref 98–107)
CO2 SERPL-SCNC: 29.8 MMOL/L (ref 22–29)
COLOR UR: YELLOW
CREAT SERPL-MCNC: 0.79 MG/DL (ref 0.57–1)
EOSINOPHIL # BLD AUTO: 0.1 10*3/MM3 (ref 0–0.7)
EOSINOPHIL NFR BLD AUTO: 1.8 % (ref 0.3–6.2)
EPI CELLS #/AREA URNS HPF: ABNORMAL /HPF
ERYTHROCYTE [DISTWIDTH] IN BLOOD BY AUTOMATED COUNT: 13.9 % (ref 11.7–13)
GFR SERPLBLD CREATININE-BSD FMLA CKD-EPI: 73 ML/MIN/1.73
GFR SERPLBLD CREATININE-BSD FMLA CKD-EPI: 88 ML/MIN/1.73
GLOBULIN SER CALC-MCNC: 2.9 GM/DL
GLUCOSE SERPL-MCNC: 51 MG/DL (ref 65–99)
GLUCOSE UR QL: NEGATIVE
HCT VFR BLD AUTO: 39.6 % (ref 35.6–45.5)
HGB BLD-MCNC: 12.1 G/DL (ref 11.9–15.5)
HGB UR QL STRIP: NEGATIVE
IMM GRANULOCYTES # BLD: 0 10*3/MM3 (ref 0–0.03)
IMM GRANULOCYTES NFR BLD: 0 % (ref 0–0.5)
KETONES UR QL STRIP: NEGATIVE
LEUKOCYTE ESTERASE UR QL STRIP: (no result)
LYMPHOCYTES # BLD AUTO: 1.8 10*3/MM3 (ref 0.9–4.8)
LYMPHOCYTES NFR BLD AUTO: 31.5 % (ref 19.6–45.3)
MCH RBC QN AUTO: 28.9 PG (ref 26.9–32)
MCHC RBC AUTO-ENTMCNC: 30.6 G/DL (ref 32.4–36.3)
MCV RBC AUTO: 94.5 FL (ref 80.5–98.2)
MONOCYTES # BLD AUTO: 0.54 10*3/MM3 (ref 0.2–1.2)
MONOCYTES NFR BLD AUTO: 9.5 % (ref 5–12)
NEUTROPHILS # BLD AUTO: 3.24 10*3/MM3 (ref 1.9–8.1)
NEUTROPHILS NFR BLD AUTO: 56.7 % (ref 42.7–76)
NITRITE UR QL STRIP: POSITIVE
OTHER ANTIBIOTIC SUSC ISLT: ABNORMAL
PH UR STRIP: 5.5 [PH] (ref 5–8)
PLATELET # BLD AUTO: 294 10*3/MM3 (ref 140–500)
POTASSIUM SERPL-SCNC: 4.5 MMOL/L (ref 3.5–5.2)
PROT SERPL-MCNC: 7.1 G/DL (ref 6–8.5)
PROT UR QL STRIP: NEGATIVE
RBC # BLD AUTO: 4.19 10*6/MM3 (ref 3.9–5.2)
RBC #/AREA URNS HPF: ABNORMAL /HPF
SODIUM SERPL-SCNC: 140 MMOL/L (ref 136–145)
SP GR UR: 1.02 (ref 1–1.03)
T4 FREE SERPL-MCNC: 0.99 NG/DL (ref 0.93–1.7)
TSH SERPL DL<=0.005 MIU/L-ACNC: 1.77 MIU/ML (ref 0.27–4.2)
UROBILINOGEN UR STRIP-MCNC: (no result) MG/DL
WBC # BLD AUTO: 5.71 10*3/MM3 (ref 4.5–10.7)
WBC #/AREA URNS HPF: ABNORMAL /HPF

## 2018-05-30 ENCOUNTER — TELEPHONE (OUTPATIENT)
Dept: INTERNAL MEDICINE | Facility: CLINIC | Age: 68
End: 2018-05-30

## 2018-06-04 RX ORDER — DOXYCYCLINE HYCLATE 100 MG/1
100 TABLET, DELAYED RELEASE ORAL 2 TIMES DAILY
Qty: 24 TABLET | Refills: 0 | Status: SHIPPED | OUTPATIENT
Start: 2018-06-04 | End: 2018-10-23

## 2018-06-06 DIAGNOSIS — E87.6 LOW BLOOD POTASSIUM: Primary | ICD-10-CM

## 2018-06-07 LAB
BUN SERPL-MCNC: 13 MG/DL (ref 8–23)
BUN/CREAT SERPL: 12.1 (ref 7–25)
CALCIUM SERPL-MCNC: 9.5 MG/DL (ref 8.6–10.5)
CHLORIDE SERPL-SCNC: 97 MMOL/L (ref 98–107)
CO2 SERPL-SCNC: 29.2 MMOL/L (ref 22–29)
CREAT SERPL-MCNC: 1.07 MG/DL (ref 0.57–1)
GFR SERPLBLD CREATININE-BSD FMLA CKD-EPI: 51 ML/MIN/1.73
GFR SERPLBLD CREATININE-BSD FMLA CKD-EPI: 62 ML/MIN/1.73
GLUCOSE SERPL-MCNC: 103 MG/DL (ref 65–99)
POTASSIUM SERPL-SCNC: 3.7 MMOL/L (ref 3.5–5.2)
SODIUM SERPL-SCNC: 140 MMOL/L (ref 136–145)

## 2018-06-10 NOTE — PROGRESS NOTES
Subjective   Marlyn Rosario is a 68 y.o. female.   She is here today for hospital discharge follow-up for pyelonephritis and doing much better now along with type 2 diabetes with diabetic polyneuropathy fatigue vaginal candidiasis and angular cheilitis as well  History of Present Illness   She is here today for hospital discharge follow-up for pyelonephritis and doing much better now along with type 2 diabetes with diabetic polyneuropathy which has not been a stable as I would like an fatigue which is expected after her hospital stay and this is getting better and vaginal candidiasis which is bothering her and angular cheilitis as well which is new  The following portions of the patient's history were reviewed and updated as appropriate: allergies, current medications, past family history, past medical history, past social history, past surgical history and problem list.    Review of Systems   Constitutional: Positive for fatigue.   HENT:        Angular cheilitis   Genitourinary:        Vaginal candidiasis   All other systems reviewed and are negative.      Objective   Physical Exam   Constitutional: She is oriented to person, place, and time. She appears well-developed and well-nourished. She is cooperative.   HENT:   Head: Normocephalic and atraumatic.   Right Ear: Hearing, tympanic membrane, external ear and ear canal normal.   Left Ear: Hearing, tympanic membrane, external ear and ear canal normal.   Nose: Nose normal.   Mouth/Throat: Uvula is midline, oropharynx is clear and moist and mucous membranes are normal.       Eyes: Conjunctivae, EOM and lids are normal. Pupils are equal, round, and reactive to light.   Neck: Phonation normal. Neck supple. Carotid bruit is not present.   Cardiovascular: Normal rate, regular rhythm and normal heart sounds.  Exam reveals no gallop and no friction rub.    No murmur heard.  Pulmonary/Chest: Effort normal and breath sounds normal. No respiratory distress.   Abdominal:  Soft. Bowel sounds are normal. She exhibits no distension and no mass. There is no hepatosplenomegaly. There is no tenderness. There is no rebound and no guarding. No hernia.   Genitourinary:         Musculoskeletal: She exhibits no edema.   Neurological: She is alert and oriented to person, place, and time. Coordination and gait normal.   Skin: Skin is warm and dry.   Psychiatric: She has a normal mood and affect. Her speech is normal and behavior is normal. Judgment and thought content normal.   Nursing note and vitals reviewed.      Assessment/Plan   Diagnoses and all orders for this visit:    Hospital discharge follow-up    Pyelonephritis  -     CBC & Differential  -     Comprehensive Metabolic Panel  -     Urinalysis With Microscopic - Urine, Clean Catch  -     Urine Culture - Urine, Urine, Clean Catch    Type 2 diabetes mellitus with diabetic polyneuropathy, unspecified long term insulin use status    Fatigue, unspecified type  -     T4, Free  -     TSH    Candidiasis of vagina    Angular cheilitis    Other orders  -     fluconazole (DIFLUCAN) 150 MG tablet; Take 1 tablet by mouth Daily.  -     Microscopic Examination      Hospital discharge follow-up doing better from pyelonephritis standpoint  Pyelonephritis we will recheck urine culture and see if this is clear but the antibacterial medication may have caused her vaginal candidiasis  Type 2 diabetes with diabetic polyneuropathy not a stable as we would like and we will check A1c Wednesday  Fatigue noted and we will check TSH free T4 but this could be secondary to hospital stay  Vaginal candidiasis noted and we will provide Diflucan and go from there  Angular cheilitis she will take vitamin B supplementation and follow from there

## 2018-06-11 ENCOUNTER — RESULTS ENCOUNTER (OUTPATIENT)
Dept: INTERNAL MEDICINE | Facility: CLINIC | Age: 68
End: 2018-06-11

## 2018-06-11 DIAGNOSIS — E87.6 LOW BLOOD POTASSIUM: ICD-10-CM

## 2018-06-17 ENCOUNTER — APPOINTMENT (OUTPATIENT)
Dept: GENERAL RADIOLOGY | Facility: HOSPITAL | Age: 68
End: 2018-06-17

## 2018-06-17 ENCOUNTER — HOSPITAL ENCOUNTER (EMERGENCY)
Facility: HOSPITAL | Age: 68
Discharge: HOME | End: 2018-06-17
Attending: EMERGENCY MEDICINE | Admitting: EMERGENCY MEDICINE

## 2018-06-17 ENCOUNTER — APPOINTMENT (OUTPATIENT)
Dept: CT IMAGING | Facility: HOSPITAL | Age: 68
End: 2018-06-17

## 2018-06-17 VITALS
RESPIRATION RATE: 16 BRPM | TEMPERATURE: 97.8 F | DIASTOLIC BLOOD PRESSURE: 124 MMHG | BODY MASS INDEX: 26.66 KG/M2 | HEART RATE: 60 BPM | OXYGEN SATURATION: 98 % | WEIGHT: 160 LBS | HEIGHT: 65 IN | SYSTOLIC BLOOD PRESSURE: 141 MMHG

## 2018-06-17 DIAGNOSIS — J18.9 PNEUMONIA OF LEFT UPPER LOBE DUE TO INFECTIOUS ORGANISM: Primary | ICD-10-CM

## 2018-06-17 DIAGNOSIS — R10.11 RIGHT UPPER QUADRANT ABDOMINAL PAIN: ICD-10-CM

## 2018-06-17 LAB
ALBUMIN SERPL-MCNC: 4.4 G/DL (ref 3.5–5.2)
ALBUMIN/GLOB SERPL: 1.5 G/DL
ALP SERPL-CCNC: 54 U/L (ref 39–117)
ALT SERPL W P-5'-P-CCNC: 9 U/L (ref 1–33)
ANION GAP SERPL CALCULATED.3IONS-SCNC: 12.3 MMOL/L
AST SERPL-CCNC: 17 U/L (ref 1–32)
BACTERIA UR QL AUTO: ABNORMAL /HPF
BASOPHILS # BLD AUTO: 0.01 10*3/MM3 (ref 0–0.2)
BASOPHILS NFR BLD AUTO: 0.2 % (ref 0–1.5)
BILIRUB SERPL-MCNC: 0.3 MG/DL (ref 0.1–1.2)
BILIRUB UR QL STRIP: NEGATIVE
BUN BLD-MCNC: 7 MG/DL (ref 8–23)
BUN/CREAT SERPL: 8.6 (ref 7–25)
CALCIUM SPEC-SCNC: 9.5 MG/DL (ref 8.6–10.5)
CHLORIDE SERPL-SCNC: 100 MMOL/L (ref 98–107)
CLARITY UR: CLEAR
CO2 SERPL-SCNC: 29.7 MMOL/L (ref 22–29)
COLOR UR: YELLOW
CREAT BLD-MCNC: 0.81 MG/DL (ref 0.57–1)
DEPRECATED RDW RBC AUTO: 45.2 FL (ref 37–54)
EOSINOPHIL # BLD AUTO: 0 10*3/MM3 (ref 0–0.7)
EOSINOPHIL NFR BLD AUTO: 0 % (ref 0.3–6.2)
ERYTHROCYTE [DISTWIDTH] IN BLOOD BY AUTOMATED COUNT: 13.8 % (ref 11.7–13)
GFR SERPL CREATININE-BSD FRML MDRD: 71 ML/MIN/1.73
GLOBULIN UR ELPH-MCNC: 2.9 GM/DL
GLUCOSE BLD-MCNC: 57 MG/DL (ref 65–99)
GLUCOSE UR STRIP-MCNC: NEGATIVE MG/DL
HCT VFR BLD AUTO: 38.7 % (ref 35.6–45.5)
HGB BLD-MCNC: 12.6 G/DL (ref 11.9–15.5)
HGB UR QL STRIP.AUTO: NEGATIVE
HYALINE CASTS UR QL AUTO: ABNORMAL /LPF
IMM GRANULOCYTES # BLD: 0.02 10*3/MM3 (ref 0–0.03)
IMM GRANULOCYTES NFR BLD: 0.3 % (ref 0–0.5)
KETONES UR QL STRIP: NEGATIVE
LEUKOCYTE ESTERASE UR QL STRIP.AUTO: ABNORMAL
LIPASE SERPL-CCNC: 31 U/L (ref 13–60)
LYMPHOCYTES # BLD AUTO: 1.66 10*3/MM3 (ref 0.9–4.8)
LYMPHOCYTES NFR BLD AUTO: 25.2 % (ref 19.6–45.3)
MCH RBC QN AUTO: 29.4 PG (ref 26.9–32)
MCHC RBC AUTO-ENTMCNC: 32.6 G/DL (ref 32.4–36.3)
MCV RBC AUTO: 90.2 FL (ref 80.5–98.2)
MONOCYTES # BLD AUTO: 0.4 10*3/MM3 (ref 0.2–1.2)
MONOCYTES NFR BLD AUTO: 6.1 % (ref 5–12)
NEUTROPHILS # BLD AUTO: 4.52 10*3/MM3 (ref 1.9–8.1)
NEUTROPHILS NFR BLD AUTO: 68.5 % (ref 42.7–76)
NITRITE UR QL STRIP: NEGATIVE
PH UR STRIP.AUTO: 6.5 [PH] (ref 5–8)
PLATELET # BLD AUTO: 303 10*3/MM3 (ref 140–500)
PMV BLD AUTO: 10.9 FL (ref 6–12)
POTASSIUM BLD-SCNC: 3.4 MMOL/L (ref 3.5–5.2)
PROT SERPL-MCNC: 7.3 G/DL (ref 6–8.5)
PROT UR QL STRIP: NEGATIVE
RBC # BLD AUTO: 4.29 10*6/MM3 (ref 3.9–5.2)
RBC # UR: ABNORMAL /HPF
REF LAB TEST METHOD: ABNORMAL
SODIUM BLD-SCNC: 142 MMOL/L (ref 136–145)
SP GR UR STRIP: 1.01 (ref 1–1.03)
SQUAMOUS #/AREA URNS HPF: ABNORMAL /HPF
UROBILINOGEN UR QL STRIP: ABNORMAL
WBC NRBC COR # BLD: 6.59 10*3/MM3 (ref 4.5–10.7)
WBC UR QL AUTO: ABNORMAL /HPF

## 2018-06-17 PROCEDURE — 94640 AIRWAY INHALATION TREATMENT: CPT

## 2018-06-17 PROCEDURE — 71046 X-RAY EXAM CHEST 2 VIEWS: CPT

## 2018-06-17 PROCEDURE — 25010000002 IOPAMIDOL 61 % SOLUTION: Performed by: EMERGENCY MEDICINE

## 2018-06-17 PROCEDURE — 83690 ASSAY OF LIPASE: CPT | Performed by: NURSE PRACTITIONER

## 2018-06-17 PROCEDURE — 81001 URINALYSIS AUTO W/SCOPE: CPT | Performed by: NURSE PRACTITIONER

## 2018-06-17 PROCEDURE — 25010000002 METHYLPREDNISOLONE PER 125 MG: Performed by: NURSE PRACTITIONER

## 2018-06-17 PROCEDURE — 96375 TX/PRO/DX INJ NEW DRUG ADDON: CPT

## 2018-06-17 PROCEDURE — 96374 THER/PROPH/DIAG INJ IV PUSH: CPT

## 2018-06-17 PROCEDURE — 25010000002 ONDANSETRON PER 1 MG: Performed by: NURSE PRACTITIONER

## 2018-06-17 PROCEDURE — 74177 CT ABD & PELVIS W/CONTRAST: CPT

## 2018-06-17 PROCEDURE — 80053 COMPREHEN METABOLIC PANEL: CPT | Performed by: NURSE PRACTITIONER

## 2018-06-17 PROCEDURE — 25010000002 HYDROMORPHONE PER 4 MG: Performed by: NURSE PRACTITIONER

## 2018-06-17 PROCEDURE — 94799 UNLISTED PULMONARY SVC/PX: CPT

## 2018-06-17 PROCEDURE — 85025 COMPLETE CBC W/AUTO DIFF WBC: CPT | Performed by: NURSE PRACTITIONER

## 2018-06-17 PROCEDURE — 99284 EMERGENCY DEPT VISIT MOD MDM: CPT

## 2018-06-17 RX ORDER — SODIUM CHLORIDE 0.9 % (FLUSH) 0.9 %
10 SYRINGE (ML) INJECTION AS NEEDED
Status: DISCONTINUED | OUTPATIENT
Start: 2018-06-17 | End: 2018-06-17 | Stop reason: HOSPADM

## 2018-06-17 RX ORDER — DOXYCYCLINE HYCLATE 100 MG/1
100 TABLET, DELAYED RELEASE ORAL 2 TIMES DAILY
Qty: 14 TABLET | Refills: 0 | Status: SHIPPED | OUTPATIENT
Start: 2018-06-17 | End: 2018-09-10 | Stop reason: SDUPTHER

## 2018-06-17 RX ORDER — ALBUTEROL SULFATE 90 UG/1
2 AEROSOL, METERED RESPIRATORY (INHALATION) EVERY 4 HOURS PRN
Qty: 1 INHALER | Refills: 0 | Status: SHIPPED | OUTPATIENT
Start: 2018-06-17 | End: 2019-05-03

## 2018-06-17 RX ORDER — ALBUTEROL SULFATE 2.5 MG/3ML
2.5 SOLUTION RESPIRATORY (INHALATION)
Status: COMPLETED | OUTPATIENT
Start: 2018-06-17 | End: 2018-06-17

## 2018-06-17 RX ORDER — METHYLPREDNISOLONE SODIUM SUCCINATE 125 MG/2ML
125 INJECTION, POWDER, LYOPHILIZED, FOR SOLUTION INTRAMUSCULAR; INTRAVENOUS ONCE
Status: COMPLETED | OUTPATIENT
Start: 2018-06-17 | End: 2018-06-17

## 2018-06-17 RX ORDER — DICYCLOMINE HCL 20 MG
20 TABLET ORAL EVERY 6 HOURS PRN
Qty: 20 TABLET | Refills: 0 | Status: SHIPPED | OUTPATIENT
Start: 2018-06-17 | End: 2018-10-23

## 2018-06-17 RX ORDER — ONDANSETRON 2 MG/ML
4 INJECTION INTRAMUSCULAR; INTRAVENOUS ONCE
Status: COMPLETED | OUTPATIENT
Start: 2018-06-17 | End: 2018-06-17

## 2018-06-17 RX ORDER — ONDANSETRON 4 MG/1
4 TABLET, ORALLY DISINTEGRATING ORAL EVERY 6 HOURS PRN
Qty: 12 TABLET | Refills: 0 | Status: SHIPPED | OUTPATIENT
Start: 2018-06-17 | End: 2019-05-03

## 2018-06-17 RX ORDER — OXYCODONE HYDROCHLORIDE 5 MG/1
15 TABLET ORAL EVERY 6 HOURS PRN
COMMUNITY
End: 2019-09-05

## 2018-06-17 RX ORDER — HYDROMORPHONE HYDROCHLORIDE 1 MG/ML
0.5 INJECTION, SOLUTION INTRAMUSCULAR; INTRAVENOUS; SUBCUTANEOUS ONCE
Status: COMPLETED | OUTPATIENT
Start: 2018-06-17 | End: 2018-06-17

## 2018-06-17 RX ADMIN — METHYLPREDNISOLONE SODIUM SUCCINATE 125 MG: 125 INJECTION, POWDER, FOR SOLUTION INTRAMUSCULAR; INTRAVENOUS at 16:59

## 2018-06-17 RX ADMIN — HYDROMORPHONE HYDROCHLORIDE 0.5 MG: 1 INJECTION, SOLUTION INTRAMUSCULAR; INTRAVENOUS; SUBCUTANEOUS at 16:55

## 2018-06-17 RX ADMIN — IOPAMIDOL 85 ML: 612 INJECTION, SOLUTION INTRAVENOUS at 17:51

## 2018-06-17 RX ADMIN — ALBUTEROL SULFATE 2.5 MG: 2.5 SOLUTION RESPIRATORY (INHALATION) at 19:57

## 2018-06-17 RX ADMIN — ALBUTEROL SULFATE 2.5 MG: 2.5 SOLUTION RESPIRATORY (INHALATION) at 17:12

## 2018-06-17 RX ADMIN — ALBUTEROL SULFATE 2.5 MG: 2.5 SOLUTION RESPIRATORY (INHALATION) at 16:53

## 2018-06-17 RX ADMIN — ONDANSETRON 4 MG: 2 INJECTION INTRAMUSCULAR; INTRAVENOUS at 16:55

## 2018-06-17 RX ADMIN — SODIUM CHLORIDE 1000 ML: 9 INJECTION, SOLUTION INTRAVENOUS at 16:55

## 2018-06-17 NOTE — ED PROVIDER NOTES
"  EMERGENCY DEPARTMENT ENCOUNTER    CHIEF COMPLAINT  Chief Complaint: abdominal pain  History given by: patient  History limited by: N/A  Room Number: 13/13  PMD: Frank Mustafa MD      HPI:  Pt is a 68 y.o. female who is s/p cholecystectomy and reports that she has hx of \"fatty liver disease\". She presents with intermittent sharp RUQ abd pain that started about 1 week ago. It has no aggravating factors and no alleviating factors. She reports that she has also had nausea, vomiting (last occurred last night), diarrhea (last occurred this morning), cough, and abd bloating. She denies pain and difficulty with urination, chest pain, dyspnea, fevers, chills, and ETOH use. She notes that she had somewhat similar sx when she was admitted at [facility] in 4/2018 for pyelonephritis and hypokalemia. Past Medical History of \"fatty liver disease\", diabetes, hyperlipidemia, and UTI/pyelonephritis.     Duration: started about 1 week ago  Timing: intermittent  Location: RUQ abdomen  Radiation: none  Quality: sharp  Intensity/Severity: moderate  Progression: unchanged  Associated Symptoms: N/V/D, cough, abd bloating  Aggravating Factors: none  Alleviating Factors: none  Previous Episodes: Pt notes that she had somewhat similar sx when she was admitted at [facility] in 4/2018 for pyelonephritis and hypokalemia.   Treatment before arrival: none mentioned     PAST MEDICAL HISTORY  Active Ambulatory Problems     Diagnosis Date Noted   • Left lower quadrant pain 04/04/2016   • Right upper quadrant pain 04/04/2016   • Acute cystitis 04/04/2016   • Acute frontal sinusitis 04/04/2016   • Acute maxillary sinusitis 04/04/2016   • Asthmatic bronchitis 04/04/2016   • Carpal tunnel syndrome 04/04/2016   • Eczema 04/04/2016   • Brittle diabetes mellitus 04/04/2016   • Dysuria 04/04/2016   • Fatigue 04/04/2016   • Fibrocystic breast changes 04/04/2016   • Generalized anxiety disorder 04/04/2016   • Generalized osteoarthritis " 04/04/2016   • Hyperlipidemia 04/04/2016   • Hypoglycemia 04/04/2016   • Foot-drop 04/04/2016   • Nonalcoholic fatty liver disease 04/04/2016   • Otitis media 04/04/2016   • Peripheral neuropathy 04/04/2016   • Sciatica 04/04/2016   • Type 2 diabetes mellitus with diabetic polyneuropathy 04/04/2016   • Upper respiratory tract infection 04/04/2016   • Increased frequency of urination 04/04/2016   • Urinary urgency 04/04/2016   • Urinary tract infection 04/04/2016   • Candidiasis of vagina 04/04/2016   • Vertigo 04/04/2016   • Cobalamin deficiency 04/04/2016   • Vitamin D deficiency 04/04/2016   • Adhesive capsulitis of left shoulder 06/03/2016   • Left shoulder pain 06/03/2016   • Health care maintenance 10/21/2016   • Slow transit constipation 10/21/2016   • Bursitis/tendonitis, shoulder 01/12/2017   • Medicare annual wellness visit, subsequent 04/21/2017   • Chronic midline low back pain with left-sided sciatica 06/29/2017   • Controlled substance agreement signed 06/29/2017   • Visit for screening mammogram 04/23/2018   • Hospital discharge follow-up 05/23/2018   • Pyelonephritis 05/23/2018   • Angular cheilitis 05/23/2018     Resolved Ambulatory Problems     Diagnosis Date Noted   • No Resolved Ambulatory Problems     Past Medical History:   Diagnosis Date   • Arthritis    • Asthma    • Chronic midline low back pain with left-sided sciatica    • Cystitis    • Diabetic peripheral neuropathy    • Hyperlipidemia    • Nephrolithiasis    • Urinary tract infection        PAST SURGICAL HISTORY  Past Surgical History:   Procedure Laterality Date   • BREAST BIOPSY     • LAPAROSCOPIC CHOLECYSTECTOMY W/ CHOLANGIOGRAPHY  11/01/2012   • LIVER BIOPSY  11/01/2012   • PARTIAL HYSTERECTOMY     • UPPER GASTROINTESTINAL ENDOSCOPY  11/26/2012       FAMILY HISTORY  History reviewed. No pertinent family history.    SOCIAL HISTORY  Social History     Social History   • Marital status:      Spouse name: N/A   • Number of  children: N/A   • Years of education: N/A     Occupational History   • Not on file.     Social History Main Topics   • Smoking status: Former Smoker   • Smokeless tobacco: Not on file   • Alcohol use No   • Drug use: No   • Sexual activity: Not on file     Other Topics Concern   • Not on file     Social History Narrative   • No narrative on file         ALLERGIES  Gabapentin; Lyrica [pregabalin]; and Wellbutrin [bupropion]    REVIEW OF SYSTEMS  Review of Systems   Constitutional: Negative for chills and fever.   HENT: Negative for sore throat.    Respiratory: Positive for cough. Negative for shortness of breath.    Cardiovascular: Negative for chest pain.   Gastrointestinal: Positive for abdominal pain (RUQ abd pain), diarrhea, nausea and vomiting.        Abd bloating   Genitourinary: Negative for difficulty urinating and dysuria.   Musculoskeletal: Negative for back pain.   Skin: Negative for rash.   Neurological: Negative for dizziness and headaches.   Psychiatric/Behavioral: The patient is not nervous/anxious.        PHYSICAL EXAM  ED Triage Vitals   Temp Heart Rate Resp BP SpO2   06/17/18 1521 06/17/18 1455 06/17/18 1455 06/17/18 1455 06/17/18 1455   97.8 °F (36.6 °C) 66 18 138/78 96 % WNL       Physical Exam   Constitutional: She is oriented to person, place, and time and well-developed, well-nourished, and in no distress.  Non-toxic appearance.   HENT:   Head: Normocephalic and atraumatic.   Mouth/Throat: Mucous membranes are normal.   Eyes: EOM are normal. No scleral icterus.   Neck: Normal range of motion.   Cardiovascular: Normal rate, regular rhythm and normal heart sounds.    Pulmonary/Chest: Effort normal. No respiratory distress. She has wheezes. She has rhonchi.   Abdominal: Soft. Bowel sounds are normal. She exhibits no distension. There is tenderness in the right upper quadrant and right lower quadrant. There is no rebound, no guarding and no CVA tenderness.   Musculoskeletal: Normal range of motion.    Neurological: She is alert and oriented to person, place, and time. She has normal motor skills and normal sensation.   Skin: Skin is warm and dry.   Psychiatric: Mood and affect normal.   Nursing note and vitals reviewed.      LAB RESULTS  Recent Results (from the past 24 hour(s))   Comprehensive Metabolic Panel    Collection Time: 06/17/18  4:56 PM   Result Value Ref Range    Glucose 57 (L) 65 - 99 mg/dL    BUN 7 (L) 8 - 23 mg/dL    Creatinine 0.81 0.57 - 1.00 mg/dL    Sodium 142 136 - 145 mmol/L    Potassium 3.4 (L) 3.5 - 5.2 mmol/L    Chloride 100 98 - 107 mmol/L    CO2 29.7 (H) 22.0 - 29.0 mmol/L    Calcium 9.5 8.6 - 10.5 mg/dL    Total Protein 7.3 6.0 - 8.5 g/dL    Albumin 4.40 3.50 - 5.20 g/dL    ALT (SGPT) 9 1 - 33 U/L    AST (SGOT) 17 1 - 32 U/L    Alkaline Phosphatase 54 39 - 117 U/L    Total Bilirubin 0.3 0.1 - 1.2 mg/dL    eGFR Non African Amer 71 >60 mL/min/1.73    Globulin 2.9 gm/dL    A/G Ratio 1.5 g/dL    BUN/Creatinine Ratio 8.6 7.0 - 25.0    Anion Gap 12.3 mmol/L   Lipase    Collection Time: 06/17/18  4:56 PM   Result Value Ref Range    Lipase 31 13 - 60 U/L   CBC Auto Differential    Collection Time: 06/17/18  4:56 PM   Result Value Ref Range    WBC 6.59 4.50 - 10.70 10*3/mm3    RBC 4.29 3.90 - 5.20 10*6/mm3    Hemoglobin 12.6 11.9 - 15.5 g/dL    Hematocrit 38.7 35.6 - 45.5 %    MCV 90.2 80.5 - 98.2 fL    MCH 29.4 26.9 - 32.0 pg    MCHC 32.6 32.4 - 36.3 g/dL    RDW 13.8 (H) 11.7 - 13.0 %    RDW-SD 45.2 37.0 - 54.0 fl    MPV 10.9 6.0 - 12.0 fL    Platelets 303 140 - 500 10*3/mm3    Neutrophil % 68.5 42.7 - 76.0 %    Lymphocyte % 25.2 19.6 - 45.3 %    Monocyte % 6.1 5.0 - 12.0 %    Eosinophil % 0.0 (L) 0.3 - 6.2 %    Basophil % 0.2 0.0 - 1.5 %    Immature Grans % 0.3 0.0 - 0.5 %    Neutrophils, Absolute 4.52 1.90 - 8.10 10*3/mm3    Lymphocytes, Absolute 1.66 0.90 - 4.80 10*3/mm3    Monocytes, Absolute 0.40 0.20 - 1.20 10*3/mm3    Eosinophils, Absolute 0.00 0.00 - 0.70 10*3/mm3    Basophils,  Absolute 0.01 0.00 - 0.20 10*3/mm3    Immature Grans, Absolute 0.02 0.00 - 0.03 10*3/mm3   Urinalysis With / Microscopic If Indicated (No Culture) - Urine, Clean Catch    Collection Time: 06/17/18  4:57 PM   Result Value Ref Range    Color, UA Yellow Yellow, Straw    Appearance, UA Clear Clear    pH, UA 6.5 5.0 - 8.0    Specific Gravity, UA 1.007 1.005 - 1.030    Glucose, UA Negative Negative    Ketones, UA Negative Negative    Bilirubin, UA Negative Negative    Blood, UA Negative Negative    Protein, UA Negative Negative    Leuk Esterase, UA Moderate (2+) (A) Negative    Nitrite, UA Negative Negative    Urobilinogen, UA 0.2 E.U./dL 0.2 - 1.0 E.U./dL   Urinalysis, Microscopic Only - Urine, Clean Catch    Collection Time: 06/17/18  4:57 PM   Result Value Ref Range    RBC, UA 0-2 None Seen, 0-2 /HPF    WBC, UA 6-12 (A) None Seen, 0-2 /HPF    Bacteria, UA None Seen None Seen /HPF    Squamous Epithelial Cells, UA 3-6 (A) None Seen, 0-2 /HPF    Hyaline Casts, UA 0-2 None Seen /LPF    Methodology Automated Microscopy        I ordered the above labs and reviewed the results    RADIOLOGY    XR Chest 2 View (Final result)   Result time 06/17/18 18:04:56   Final result by Christopher Goodson MD (06/17/18 18:04:56)                Narrative:    PA AND LATERAL CHEST     CLINICAL HISTORY: Cough     Compared to the previous chest x-ray dated 10/1/2014.     The PA view demonstrates minimal focal ill-defined increased density  projecting along the left heart border that could be a small area of  infiltrate in the lingula of the left upper lobe. The right lung is well  expanded and clear.. There are no pleural effusions. The heart is normal  in size.     This report was finalized on 6/17/2018 6:04 PM by Dr. Christopher Goodson M.D.                 CT Abdomen Pelvis With Contrast (Final result)   Result time 06/17/18 18:51:35   Final result by Christopher Goodson MD (06/17/18 18:51:35)                Impression:    Small chronic right ovarian  cyst, unchanged since 1/13/2014.  Postoperative changes as noted. Otherwise unremarkable CT scan of the  abdomen and pelvis. No acute process is identified.     This report was finalized on 6/17/2018 6:51 PM by Dr. Christopher Goodson M.D.               Narrative:    CT ABDOMEN PELVIS W CONTRAST-     CLINICAL HISTORY: Right upper quadrant and right lower quadrant pain.     TECHNIQUE: Spiral CT images were obtained through the abdomen and pelvis  with IV contrast and were reconstructed in 3 mm thick axial slices.     Radiation dose reduction techniques were utilized, including automated  exposure control and exposure modulation based on body size.     COMPARISON: CT scan of the abdomen and pelvis dated 1/13/2014.     There is slight dilatation of the intrahepatic and extrahepatic bile  ducts that is within normal limits for this 68 year old patient who is  status post cholecystectomy. The liver is otherwise unremarkable. The  spleen and pancreas and adrenal glands appear within normal limits.  There is a tiny approximately 4 mm diameter nonobstructing lower pole  left renal calculus. A 1 mm diameter lower pole left renal calculus is  also noted. There is also a 1 to 2 mm diameter nonobstructing lower pole  right renal calculus. A tiny cortical cyst is present in the right  kidney. The stomach and small and large bowel are unremarkable. Uterus  is absent. There is a 2 cm diameter exophytic cyst originating from the  right ovary. This is unchanged since the preceding CTA. There is  evidence of previous left inguinal hernia repair.                I ordered the above noted radiological studies and reviewed the images on the PACS system.  Spoke with Dr. Goodson (radiologist) regarding CT abd/pel scan results        MEDICAL RECORD REVIEW  Pt was admitted at [facility] from 4/29/18 to 5/6/18 for pyelonephritis, hypokalemia, severe sepsis, and acute respiratory failure with hypercapnia.         PROGRESS AND CONSULTS  6135-  Ordered blood work, lipase, UA, CXR, and CT abd/pel for further evaluation. Ordered IV fluids for hydration, solumedrol and albuterol nebulizer for wheezing and rhonchi, dilaudid for pain, and zofran for nausea.   1818- Reviewed pt's history and workup with Dr. Sarkar.  At bedside evaluation, they agree with the plan of care.  1851- Obtained CT abd/pel results-> no acute process.   1855- Family is now at pt's bedside. Rechecked pt. She is resting comfortably and is in no acute distress. Pt has had no vomiting in ED. Discussed with pt and family about all pertinent results including normal WBC count, normal renal function, unremarkable UA, CT abd/pel findings (no acute process), and CXR findings (left upper lobe pneumonia). Informed them of plan to prescribe pt with abx for pneumonia, albuterol inhaler, bentyl for RUQ abd discomfort, and antiemetic. Advised pt to rest, to well hydrate, and to follow diet as tolerated.   Reviewed implications of results, diagnosis, meds, responsibility to follow up, warning signs and symptoms of possible worsening, potential complications and reasons to return to ER with patient and family.  Discussed all results and noted any abnormalities with patient and family.  Discussed with pt and family about absolute need to have pt follow up with PMD and referred gastroenterologist for recheck of abnormalities and condition and for further testing/treatment as needed.  Discussed plan for discharge, as there is no emergent indication for admission.  Pt and family are agreeable and understand need for follow up and repeat testing.  They are aware that discharge does not mean that nothing is wrong but it indicates no emergency is present.  Pt is discharged with instructions to follow up with primary care doctor to have their blood pressure rechecked.       DIAGNOSIS  Final diagnoses:   Pneumonia of left upper lobe due to infectious organism   Right upper quadrant abdominal pain       FOLLOW  UP   Frank Mustafa MD  [address]  [place] [id number]  [phone]    Call in 1 day      Clif Horton MD  [address]  [facility] [address]  [phone]    Schedule an appointment as soon as possible for a visit   If symptoms worsen      RX     Medication List      albuterol 108 (90 Base) MCG/ACT inhaler  Commonly known as:  PROVENTIL HFA;VENTOLIN HFA  Inhale 2 puffs Every 4 (Four) Hours As Needed for Wheezing or Shortness of   Air.     dicyclomine 20 MG tablet  Commonly known as:  BENTYL  Take 1 tablet by mouth Every 6 (Six) Hours As Needed (spasms).     ondansetron ODT 4 MG disintegrating tablet  Commonly known as:  ZOFRAN-ODT  Take 1 tablet by mouth Every 6 (Six) Hours As Needed for Nausea or   Vomiting.       doxycycline 100 MG enteric coated tablet  Commonly known as:  DORYX  Take 1 tablet by mouth 2 (Two) Times a Day.       COURSE & MEDICAL DECISION MAKING  Pertinent Labs and Imaging studies that were ordered and reviewed are noted above.  Results were reviewed/discussed with the patient and family and they were also made aware of online assess.   Pt and family also made aware that some labs, such as cultures, will not be resulted during ER visit and follow up with PMD is necessary.     MEDICATIONS GIVEN IN ER  Medications   sodium chloride 0.9 % flush 10 mL (not administered)   albuterol (PROVENTIL) nebulizer solution 0.083% 2.5 mg/3mL ( Nebulization Canceled Entry 6/17/18 1753)   sodium chloride 0.9 % bolus 1,000 mL (1,000 mL Intravenous New Bag 6/17/18 1655)   HYDROmorphone (DILAUDID) injection 0.5 mg (0.5 mg Intravenous Given 6/17/18 1655)   ondansetron (ZOFRAN) injection 4 mg (4 mg Intravenous Given 6/17/18 1655)   methylPREDNISolone sodium succinate (SOLU-Medrol) injection 125 mg (125 mg Intravenous Given 6/17/18 1659)   iopamidol (ISOVUE-300) 61 % injection 100 mL (85 mL Intravenous Given 6/17/18 1751)       /52   Pulse 61   Temp 97.8 °F (36.6 °C) (Oral)   " Resp 16   Ht 165.1 cm (65\")   Wt 72.6 kg (160 lb)   LMP  (LMP Unknown)   SpO2 93%   BMI 26.63 kg/m²       I personally reviewed the past medical history, past surgical history, social history, family history, current medications and allergies as they appear in this chart.  The scribe's note accurately reflects the work and decisions made by me.     Documentation assistance provided by ada Isaacs for OSCAR Ortega on 6/17/2018 at 7:05 PM. Information recorded by the scribe was done at my direction and has been verified and validated by me.       Jose Isaacs  06/17/18 1926       GINETTE Aviles  06/17/18 2120    "

## 2018-06-17 NOTE — DISCHARGE INSTRUCTIONS
Medications as ordered  Rest, increase fluids  Diet as tolerated  Follow up with pmd and GI- call in am for appointments  Return to er for fever, chills, vomiting, chest pain, shortness of air, worsening abdominal pain or any new or worsening symptoms

## 2018-06-17 NOTE — ED PROVIDER NOTES
MD ATTESTATION NOTE    The ASHELY and I have discussed this patient's history, physical exam, and treatment plan.  I have reviewed the documentation and personally had a face to face interaction with the patient. I affirm the documentation and agree with the treatment and plan.  The attached note describes my personal findings.      Pt with h/o recurrent abdominal pain presents to the ED c/o RUQ abdominal pain onset about a week ago. Pt states that she has also had N/V/D and abdominal distension. Pt denies chest pain, dyspnea, and dysuria. Pt reports that she was admitted for similar symptoms in 04/2018 at [facility]; while there, pt was diagnosed with and treated for pyelonephritis. On physical exam, pt is alert and oriented x3. Heart is RRR. Lungs are CTAB. There is tenderness of the RUQ and epigastrium of the abdomen. Pt's CT Abd is negative acute. Pt's CXR shows a small area of infiltrate in the lingula of the left upper lobe. Pt will be prescribed with rx for antibiotics to treat for pneumonia and will be referred to the GI for close f/u.         Documentation assistance provided by Karlos Isaacs. Information recorded by the scribe was done at my direction and has been verified and validated by me.     Entered by Karlos Isaacs, acting as scribe for Dr. Mello MD.        Karlos Isaacs  06/17/18 5251       Ren Sarkar MD  06/17/18 1206

## 2018-06-17 NOTE — ED NOTES
"Pt reports 7/10 RUQ pain, constant, x1 week pta with onset N/V two days PTA.  Pt reports chills, denies subjective fever; reports last BM this morning, denies dysuria.  On assessment, pt in no apparent distress; abdomen distended, firm with tenderness on palpation RUQ; bowel sounds hyperactive x4; respirations deep, even 16BPM, lung sounds CTA a/p/bilat; pt reports history of \"liver problems,\" states: \"this has happened to my stomach before, and it's gone down on its own.\"  Pt positioned for comfort, call light within reach, alarms activated and audible.     Matthew Amor RN  06/17/18 6130    "

## 2018-06-18 ENCOUNTER — TELEPHONE (OUTPATIENT)
Dept: INTERNAL MEDICINE | Facility: CLINIC | Age: 68
End: 2018-06-18

## 2018-06-18 RX ORDER — ALPRAZOLAM 1 MG/1
TABLET ORAL
Qty: 120 TABLET | Refills: 0 | Status: SHIPPED | OUTPATIENT
Start: 2018-06-18 | End: 2018-07-16 | Stop reason: SDUPTHER

## 2018-06-19 ENCOUNTER — TELEPHONE (OUTPATIENT)
Dept: SOCIAL WORK | Facility: HOSPITAL | Age: 68
End: 2018-06-19

## 2018-07-16 RX ORDER — ALPRAZOLAM 1 MG/1
TABLET ORAL
Qty: 120 TABLET | Refills: 0 | Status: SHIPPED | OUTPATIENT
Start: 2018-07-16 | End: 2018-08-16 | Stop reason: SDUPTHER

## 2018-07-19 RX ORDER — ATORVASTATIN CALCIUM 80 MG/1
80 TABLET, FILM COATED ORAL DAILY
Qty: 90 TABLET | Refills: 0 | Status: SHIPPED | OUTPATIENT
Start: 2018-07-19 | End: 2019-04-16 | Stop reason: SDUPTHER

## 2018-08-16 RX ORDER — ALPRAZOLAM 1 MG/1
TABLET ORAL
Qty: 120 TABLET | Refills: 0 | Status: SHIPPED | OUTPATIENT
Start: 2018-08-16 | End: 2018-09-17 | Stop reason: SDUPTHER

## 2018-09-10 RX ORDER — NAPROXEN SODIUM 220 MG
TABLET ORAL
Qty: 100 EACH | Refills: 11 | Status: SHIPPED | OUTPATIENT
Start: 2018-09-10 | End: 2019-09-05

## 2018-09-19 DIAGNOSIS — F41.9 ANXIETY: Primary | ICD-10-CM

## 2018-09-19 RX ORDER — ALPRAZOLAM 1 MG/1
TABLET ORAL
Qty: 120 TABLET | Refills: 1 | Status: SHIPPED | OUTPATIENT
Start: 2018-09-19 | End: 2018-11-19 | Stop reason: SDUPTHER

## 2018-10-10 ENCOUNTER — TELEPHONE (OUTPATIENT)
Dept: INTERNAL MEDICINE | Facility: CLINIC | Age: 68
End: 2018-10-10

## 2018-10-10 NOTE — TELEPHONE ENCOUNTER
Pt called and would like to know instead of seeing a psychiatrist if she could just see you and you titrate her xanax down. Please advise.

## 2018-10-23 ENCOUNTER — OFFICE VISIT (OUTPATIENT)
Dept: INTERNAL MEDICINE | Facility: CLINIC | Age: 68
End: 2018-10-23

## 2018-10-23 VITALS
DIASTOLIC BLOOD PRESSURE: 81 MMHG | WEIGHT: 162 LBS | HEART RATE: 87 BPM | TEMPERATURE: 97.5 F | SYSTOLIC BLOOD PRESSURE: 153 MMHG | OXYGEN SATURATION: 94 % | HEIGHT: 65 IN | BODY MASS INDEX: 26.99 KG/M2

## 2018-10-23 DIAGNOSIS — Z79.4 TYPE 2 DIABETES MELLITUS WITH DIABETIC POLYNEUROPATHY, WITH LONG-TERM CURRENT USE OF INSULIN (HCC): ICD-10-CM

## 2018-10-23 DIAGNOSIS — M54.42 CHRONIC MIDLINE LOW BACK PAIN WITH LEFT-SIDED SCIATICA: ICD-10-CM

## 2018-10-23 DIAGNOSIS — M15.9 GENERALIZED OSTEOARTHRITIS: ICD-10-CM

## 2018-10-23 DIAGNOSIS — E11.42 TYPE 2 DIABETES MELLITUS WITH DIABETIC POLYNEUROPATHY, WITH LONG-TERM CURRENT USE OF INSULIN (HCC): ICD-10-CM

## 2018-10-23 DIAGNOSIS — E53.8 COBALAMIN DEFICIENCY: ICD-10-CM

## 2018-10-23 DIAGNOSIS — E78.2 MIXED HYPERLIPIDEMIA: Primary | ICD-10-CM

## 2018-10-23 DIAGNOSIS — F41.1 GENERALIZED ANXIETY DISORDER: ICD-10-CM

## 2018-10-23 DIAGNOSIS — E55.9 VITAMIN D DEFICIENCY: ICD-10-CM

## 2018-10-23 DIAGNOSIS — K59.01 SLOW TRANSIT CONSTIPATION: ICD-10-CM

## 2018-10-23 DIAGNOSIS — G89.29 CHRONIC MIDLINE LOW BACK PAIN WITH LEFT-SIDED SCIATICA: ICD-10-CM

## 2018-10-23 DIAGNOSIS — N39.0 RECURRENT UTI: ICD-10-CM

## 2018-10-23 PROCEDURE — 90662 IIV NO PRSV INCREASED AG IM: CPT | Performed by: INTERNAL MEDICINE

## 2018-10-23 PROCEDURE — 99214 OFFICE O/P EST MOD 30 MIN: CPT | Performed by: INTERNAL MEDICINE

## 2018-10-23 PROCEDURE — G0008 ADMIN INFLUENZA VIRUS VAC: HCPCS | Performed by: INTERNAL MEDICINE

## 2018-10-23 RX ORDER — NITROFURANTOIN MACROCRYSTALS 50 MG/1
50 CAPSULE ORAL NIGHTLY
Qty: 90 CAPSULE | Refills: 1 | Status: SHIPPED | OUTPATIENT
Start: 2018-10-23 | End: 2019-09-05

## 2018-10-24 LAB
ALBUMIN SERPL-MCNC: 4.7 G/DL (ref 3.5–5.2)
ALBUMIN/GLOB SERPL: 1.9 G/DL
ALP SERPL-CCNC: 120 U/L (ref 39–117)
ALT SERPL-CCNC: 25 U/L (ref 1–33)
APPEARANCE UR: CLEAR
AST SERPL-CCNC: 28 U/L (ref 1–32)
BACTERIA #/AREA URNS HPF: ABNORMAL /HPF
BASOPHILS # BLD AUTO: 0.03 10*3/MM3 (ref 0–0.2)
BASOPHILS NFR BLD AUTO: 0.5 % (ref 0–1.5)
BILIRUB SERPL-MCNC: 0.3 MG/DL (ref 0.1–1.2)
BILIRUB UR QL STRIP: NEGATIVE
BUN SERPL-MCNC: 8 MG/DL (ref 8–23)
BUN/CREAT SERPL: 11.1 (ref 7–25)
CALCIUM SERPL-MCNC: 9.8 MG/DL (ref 8.6–10.5)
CASTS URNS MICRO: ABNORMAL
CHLORIDE SERPL-SCNC: 99 MMOL/L (ref 98–107)
CHOLEST SERPL-MCNC: 164 MG/DL (ref 0–200)
CO2 SERPL-SCNC: 29.9 MMOL/L (ref 22–29)
COLOR UR: YELLOW
CREAT SERPL-MCNC: 0.72 MG/DL (ref 0.57–1)
EOSINOPHIL # BLD AUTO: 0.12 10*3/MM3 (ref 0–0.7)
EOSINOPHIL NFR BLD AUTO: 1.9 % (ref 0.3–6.2)
EPI CELLS #/AREA URNS HPF: ABNORMAL /HPF
ERYTHROCYTE [DISTWIDTH] IN BLOOD BY AUTOMATED COUNT: 14 % (ref 11.7–13)
GLOBULIN SER CALC-MCNC: 2.5 GM/DL
GLUCOSE SERPL-MCNC: 66 MG/DL (ref 65–99)
GLUCOSE UR QL: NEGATIVE
HBA1C MFR BLD: 8.5 % (ref 4.8–5.6)
HCT VFR BLD AUTO: 42.7 % (ref 35.6–45.5)
HDLC SERPL-MCNC: 33 MG/DL (ref 40–60)
HGB BLD-MCNC: 13.2 G/DL (ref 11.9–15.5)
HGB UR QL STRIP: NEGATIVE
IMM GRANULOCYTES # BLD: 0 10*3/MM3 (ref 0–0.03)
IMM GRANULOCYTES NFR BLD: 0 % (ref 0–0.5)
KETONES UR QL STRIP: NEGATIVE
LDLC SERPL CALC-MCNC: 85 MG/DL (ref 0–100)
LDLC/HDLC SERPL: 2.56 {RATIO}
LEUKOCYTE ESTERASE UR QL STRIP: (no result)
LYMPHOCYTES # BLD AUTO: 1.46 10*3/MM3 (ref 0.9–4.8)
LYMPHOCYTES NFR BLD AUTO: 23.3 % (ref 19.6–45.3)
MCH RBC QN AUTO: 27.1 PG (ref 26.9–32)
MCHC RBC AUTO-ENTMCNC: 30.9 G/DL (ref 32.4–36.3)
MCV RBC AUTO: 87.7 FL (ref 80.5–98.2)
MONOCYTES # BLD AUTO: 0.6 10*3/MM3 (ref 0.2–1.2)
MONOCYTES NFR BLD AUTO: 9.6 % (ref 5–12)
NEUTROPHILS # BLD AUTO: 4.05 10*3/MM3 (ref 1.9–8.1)
NEUTROPHILS NFR BLD AUTO: 64.7 % (ref 42.7–76)
NITRITE UR QL STRIP: POSITIVE
PH UR STRIP: 7 [PH] (ref 5–8)
PLATELET # BLD AUTO: 274 10*3/MM3 (ref 140–500)
POTASSIUM SERPL-SCNC: 3.5 MMOL/L (ref 3.5–5.2)
PROT SERPL-MCNC: 7.2 G/DL (ref 6–8.5)
PROT UR QL STRIP: NEGATIVE
RBC # BLD AUTO: 4.87 10*6/MM3 (ref 3.9–5.2)
RBC #/AREA URNS HPF: ABNORMAL /HPF
SODIUM SERPL-SCNC: 144 MMOL/L (ref 136–145)
SP GR UR: 1.01 (ref 1–1.03)
T4 FREE SERPL-MCNC: 1.02 NG/DL (ref 0.93–1.7)
TRIGL SERPL-MCNC: 232 MG/DL (ref 0–150)
TSH SERPL DL<=0.005 MIU/L-ACNC: 3.67 MIU/ML (ref 0.27–4.2)
URNS CMNT MICRO: ABNORMAL
UROBILINOGEN UR STRIP-MCNC: (no result) MG/DL
VLDLC SERPL CALC-MCNC: 46.4 MG/DL (ref 5–40)
WBC # BLD AUTO: 6.26 10*3/MM3 (ref 4.5–10.7)
WBC #/AREA URNS HPF: ABNORMAL /HPF

## 2018-11-12 NOTE — PROGRESS NOTES
Subjective   Marlyn Rosario is a 68 y.o. female.   She is here today for mixed hyperliidemia vitamin D deficiency slow transit constipation cobalamin deficiency type 2 diabetes chronic low back pain and recurrent UTIs and CRYSTAL and arthritis and general for which she needs pain medication  History of Present Illness   She is here today for mixed hyperlipidemia which has been stable on current medication vitahypovitaminosis D which has been stable on supplementation slow transit constipation which is stable on current medicationand slow transit constipation which has been stable and cobalamin deficiency which has been stable and type 2 diabetes which has been stable and chronic low back pa is stable and arthritis and general which is stable and CRYSTAL which is stable  Recurrent UTIs which keeps coming  The following portions of the patient's history were reviewed and updated as appropriate: allergies, current medications, past family history, past medical history, past social history, past surgical history and problem list.    Review of Systems   Constitutional: Negative for fatigue.   Genitourinary: Negative for dysuria, frequency and urgency.   Musculoskeletal: Positive for arthralgias and back pain. Negative for myalgias.   Neurological: Negative for weakness.   Psychiatric/Behavioral: Negative for decreased concentration and dysphoric mood. The patient is not nervous/anxious.    All other systems reviewed and are negative.      Objective   Physical Exam   Constitutional: She is oriented to person, place, and time. She appears well-developed and well-nourished. She is cooperative.   HENT:   Head: Normocephalic and atraumatic.   Right Ear: Hearing, tympanic membrane, external ear and ear canal normal.   Left Ear: Hearing, tympanic membrane, external ear and ear canal normal.   Nose: Nose normal.   Mouth/Throat: Uvula is midline, oropharynx is clear and moist and mucous membranes are normal.   Eyes: Conjunctivae, EOM  and lids are normal. Pupils are equal, round, and reactive to light.   Neck: Phonation normal. Neck supple. Carotid bruit is not present.   Cardiovascular: Normal rate, regular rhythm and normal heart sounds. Exam reveals no gallop and no friction rub.   No murmur heard.  Pulmonary/Chest: Effort normal and breath sounds normal. No respiratory distress.   Abdominal: Soft. Bowel sounds are normal. She exhibits no distension and no mass. There is no hepatosplenomegaly. There is no tenderness. There is no rebound and no guarding. No hernia.   Musculoskeletal: She exhibits tenderness ( arthritis throughout). She exhibits no edema.        Lumbar back: She exhibits decreased range of motion and pain.   Neurological: She is alert and oriented to person, place, and time. Coordination and gait normal.   Skin: Skin is warm and dry.   Psychiatric: She has a normal mood and affect. Her speech is normal and behavior is normal. Judgment and thought content normal.   Nursing note and vitals reviewed.      Assessment/Plan   Diagnoses and all orders for this visit:    Mixed hyperlipidemia  -     Lipid Panel With LDL / HDL Ratio  -     CBC & Differential  -     Comprehensive Metabolic Panel  -     T4, Free  -     TSH  -     Urinalysis With Microscopic - Urine, Clean Catch    Vitamin D deficiency    Slow transit constipation    Cobalamin deficiency    Type 2 diabetes mellitus with diabetic polyneuropathy, with long-term current use of insulin (CMS/[facility])  -     Hemoglobin A1c    Chronic midline low back pain with left-sided sciatica    Generalized osteoarthritis    Generalized anxiety disorder    Recurrent UTI    Other orders  -     Fluzone High Dose =>65Years  -     nitrofurantoin (MACRODANTIN) 50 MG capsule; Take 1 capsule by mouth Every Night.  -     Microscopic Examination        Mixed hyperlipidemia has been stable on current medication we will check lipid panel with liver enzymes  Hypovitaminosis D has been stable we will  follow  Slow transit constipation stable on current medication  Cobalamin deficiency has been stable and we will check labs  Type 2 diabetes has been stable overall at this time  Chronic low back pain ongoing and we wiltis in general again she needs pain meds for this  CRYSTAL stable on current medication with no changes needed  Recurrent UTIs noted we will go ahead and treat with nitrofurantoin

## 2018-11-20 RX ORDER — ALPRAZOLAM 1 MG/1
TABLET ORAL
Qty: 120 TABLET | Refills: 0 | Status: SHIPPED | OUTPATIENT
Start: 2018-11-20 | End: 2018-11-21 | Stop reason: SDUPTHER

## 2018-11-21 RX ORDER — ALPRAZOLAM 1 MG/1
1 TABLET ORAL 4 TIMES DAILY
Qty: 120 TABLET | Refills: 0 | Status: SHIPPED | OUTPATIENT
Start: 2018-11-21 | End: 2019-01-16 | Stop reason: SDUPTHER

## 2018-12-03 RX ORDER — OMEPRAZOLE 20 MG/1
CAPSULE, DELAYED RELEASE ORAL
Qty: 90 CAPSULE | Refills: 2 | Status: SHIPPED | OUTPATIENT
Start: 2018-12-03 | End: 2019-09-05 | Stop reason: ALTCHOICE

## 2019-01-04 ENCOUNTER — TELEPHONE (OUTPATIENT)
Dept: INTERNAL MEDICINE | Facility: CLINIC | Age: 69
End: 2019-01-04

## 2019-01-16 RX ORDER — ALPRAZOLAM 1 MG/1
TABLET ORAL
Qty: 120 TABLET | Refills: 0 | Status: SHIPPED | OUTPATIENT
Start: 2019-01-16 | End: 2019-02-13 | Stop reason: SDUPTHER

## 2019-02-13 RX ORDER — ALPRAZOLAM 1 MG/1
TABLET ORAL
Qty: 120 TABLET | Refills: 0 | Status: SHIPPED | OUTPATIENT
Start: 2019-02-13 | End: 2019-03-18 | Stop reason: SDUPTHER

## 2019-02-25 RX ORDER — METFORMIN HYDROCHLORIDE 500 MG/1
TABLET, EXTENDED RELEASE ORAL
Qty: 90 TABLET | Refills: 0 | Status: SHIPPED | OUTPATIENT
Start: 2019-02-25 | End: 2019-05-03

## 2019-02-26 RX ORDER — OMEPRAZOLE 20 MG/1
CAPSULE, DELAYED RELEASE ORAL
Qty: 90 CAPSULE | Refills: 1 | Status: SHIPPED | OUTPATIENT
Start: 2019-02-26 | End: 2019-09-05 | Stop reason: SDUPTHER

## 2019-03-18 RX ORDER — ALPRAZOLAM 1 MG/1
TABLET ORAL
Qty: 120 TABLET | Refills: 0 | Status: SHIPPED | OUTPATIENT
Start: 2019-03-18 | End: 2019-04-17 | Stop reason: SDUPTHER

## 2019-04-16 RX ORDER — ATORVASTATIN CALCIUM 80 MG/1
80 TABLET, FILM COATED ORAL DAILY
Qty: 90 TABLET | Refills: 1 | Status: SHIPPED | OUTPATIENT
Start: 2019-04-16 | End: 2019-09-05 | Stop reason: ALTCHOICE

## 2019-04-17 RX ORDER — ALPRAZOLAM 1 MG/1
TABLET ORAL
Qty: 120 TABLET | Refills: 0 | Status: SHIPPED | OUTPATIENT
Start: 2019-04-17 | End: 2019-05-14 | Stop reason: SDUPTHER

## 2019-05-03 ENCOUNTER — OFFICE VISIT (OUTPATIENT)
Dept: INTERNAL MEDICINE | Facility: CLINIC | Age: 69
End: 2019-05-03

## 2019-05-03 VITALS
SYSTOLIC BLOOD PRESSURE: 153 MMHG | BODY MASS INDEX: 28.99 KG/M2 | WEIGHT: 174 LBS | OXYGEN SATURATION: 92 % | RESPIRATION RATE: 16 BRPM | DIASTOLIC BLOOD PRESSURE: 83 MMHG | TEMPERATURE: 98 F | HEIGHT: 65 IN | HEART RATE: 96 BPM

## 2019-05-03 DIAGNOSIS — G62.89 OTHER POLYNEUROPATHY: ICD-10-CM

## 2019-05-03 DIAGNOSIS — E78.2 MIXED HYPERLIPIDEMIA: Primary | ICD-10-CM

## 2019-05-03 DIAGNOSIS — E11.42 TYPE 2 DIABETES MELLITUS WITH DIABETIC POLYNEUROPATHY, WITH LONG-TERM CURRENT USE OF INSULIN (HCC): ICD-10-CM

## 2019-05-03 DIAGNOSIS — Z12.31 VISIT FOR SCREENING MAMMOGRAM: ICD-10-CM

## 2019-05-03 DIAGNOSIS — F41.1 GENERALIZED ANXIETY DISORDER: ICD-10-CM

## 2019-05-03 DIAGNOSIS — Z79.4 TYPE 2 DIABETES MELLITUS WITH DIABETIC POLYNEUROPATHY, WITH LONG-TERM CURRENT USE OF INSULIN (HCC): ICD-10-CM

## 2019-05-03 DIAGNOSIS — K76.0 NONALCOHOLIC FATTY LIVER DISEASE: ICD-10-CM

## 2019-05-03 PROCEDURE — 99214 OFFICE O/P EST MOD 30 MIN: CPT | Performed by: INTERNAL MEDICINE

## 2019-05-03 RX ORDER — LISINOPRIL 10 MG/1
10 TABLET ORAL DAILY
Qty: 90 TABLET | Refills: 3 | Status: SHIPPED | OUTPATIENT
Start: 2019-05-03 | End: 2019-09-05 | Stop reason: ALTCHOICE

## 2019-05-03 RX ORDER — FLUTICASONE PROPIONATE 50 MCG
2 SPRAY, SUSPENSION (ML) NASAL DAILY
Qty: 3 BOTTLE | Refills: 5 | Status: SHIPPED | OUTPATIENT
Start: 2019-05-03 | End: 2019-09-05

## 2019-05-03 RX ORDER — FLUTICASONE PROPIONATE 50 MCG
2 SPRAY, SUSPENSION (ML) NASAL DAILY
COMMUNITY
End: 2019-05-03 | Stop reason: SDUPTHER

## 2019-05-04 LAB
ALBUMIN SERPL-MCNC: 4.4 G/DL (ref 3.5–5.2)
ALBUMIN/GLOB SERPL: 1.7 G/DL
ALP SERPL-CCNC: 129 U/L (ref 39–117)
ALT SERPL-CCNC: 27 U/L (ref 1–33)
APPEARANCE UR: (no result)
AST SERPL-CCNC: 31 U/L (ref 1–32)
BACTERIA #/AREA URNS HPF: ABNORMAL /HPF
BASOPHILS # BLD AUTO: 0.03 10*3/MM3 (ref 0–0.2)
BASOPHILS NFR BLD AUTO: 0.5 % (ref 0–1.5)
BILIRUB SERPL-MCNC: 0.5 MG/DL (ref 0.2–1.2)
BILIRUB UR QL STRIP: NEGATIVE
BUN SERPL-MCNC: 9 MG/DL (ref 8–23)
BUN/CREAT SERPL: 12.3 (ref 7–25)
CALCIUM SERPL-MCNC: 9.8 MG/DL (ref 8.6–10.5)
CASTS URNS MICRO: ABNORMAL
CHLORIDE SERPL-SCNC: 95 MMOL/L (ref 98–107)
CO2 SERPL-SCNC: 30.7 MMOL/L (ref 22–29)
COLOR UR: YELLOW
CREAT SERPL-MCNC: 0.73 MG/DL (ref 0.57–1)
EOSINOPHIL # BLD AUTO: 0 10*3/MM3 (ref 0–0.4)
EOSINOPHIL NFR BLD AUTO: 0 % (ref 0.3–6.2)
EPI CELLS #/AREA URNS HPF: ABNORMAL /HPF
ERYTHROCYTE [DISTWIDTH] IN BLOOD BY AUTOMATED COUNT: 13.8 % (ref 12.3–15.4)
GLOBULIN SER CALC-MCNC: 2.6 GM/DL
GLUCOSE SERPL-MCNC: 195 MG/DL (ref 65–99)
GLUCOSE UR QL: (no result)
HBA1C MFR BLD: 10.7 % (ref 4.8–5.6)
HCT VFR BLD AUTO: 43.7 % (ref 34–46.6)
HGB BLD-MCNC: 14.1 G/DL (ref 12–15.9)
HGB UR QL STRIP: (no result)
IMM GRANULOCYTES # BLD AUTO: 0.01 10*3/MM3 (ref 0–0.05)
IMM GRANULOCYTES NFR BLD AUTO: 0.2 % (ref 0–0.5)
KETONES UR QL STRIP: NEGATIVE
LEUKOCYTE ESTERASE UR QL STRIP: (no result)
LYMPHOCYTES # BLD AUTO: 1 10*3/MM3 (ref 0.7–3.1)
LYMPHOCYTES NFR BLD AUTO: 18.2 % (ref 19.6–45.3)
MCH RBC QN AUTO: 29.3 PG (ref 26.6–33)
MCHC RBC AUTO-ENTMCNC: 32.3 G/DL (ref 31.5–35.7)
MCV RBC AUTO: 90.9 FL (ref 79–97)
MICROALBUMIN UR-MCNC: 27.9 UG/ML
MONOCYTES # BLD AUTO: 0.4 10*3/MM3 (ref 0.1–0.9)
MONOCYTES NFR BLD AUTO: 7.3 % (ref 5–12)
NEUTROPHILS # BLD AUTO: 4.06 10*3/MM3 (ref 1.7–7)
NEUTROPHILS NFR BLD AUTO: 73.8 % (ref 42.7–76)
NITRITE UR QL STRIP: NEGATIVE
NRBC BLD AUTO-RTO: 0 /100 WBC (ref 0–0.2)
PH UR STRIP: 6 [PH] (ref 5–8)
PLATELET # BLD AUTO: 213 10*3/MM3 (ref 140–450)
POTASSIUM SERPL-SCNC: 4.4 MMOL/L (ref 3.5–5.2)
PROT SERPL-MCNC: 7 G/DL (ref 6–8.5)
PROT UR QL STRIP: NEGATIVE
RBC # BLD AUTO: 4.81 10*6/MM3 (ref 3.77–5.28)
RBC #/AREA URNS HPF: ABNORMAL /HPF
SODIUM SERPL-SCNC: 137 MMOL/L (ref 136–145)
SP GR UR: 1.01 (ref 1–1.03)
T4 FREE SERPL-MCNC: 0.99 NG/DL (ref 0.93–1.7)
TSH SERPL DL<=0.005 MIU/L-ACNC: 2.43 MIU/ML (ref 0.27–4.2)
UROBILINOGEN UR STRIP-MCNC: (no result) MG/DL
WBC # BLD AUTO: 5.5 10*3/MM3 (ref 3.4–10.8)
WBC #/AREA URNS HPF: ABNORMAL /HPF

## 2019-05-14 ENCOUNTER — TELEPHONE (OUTPATIENT)
Dept: INTERNAL MEDICINE | Facility: CLINIC | Age: 69
End: 2019-05-14

## 2019-05-14 RX ORDER — ALPRAZOLAM 1 MG/1
TABLET ORAL
Qty: 120 TABLET | Refills: 1 | Status: SHIPPED | OUTPATIENT
Start: 2019-05-14 | End: 2019-07-15 | Stop reason: SDUPTHER

## 2019-05-16 RX ORDER — METFORMIN HYDROCHLORIDE 500 MG/1
TABLET, EXTENDED RELEASE ORAL
Qty: 90 TABLET | Refills: 0 | Status: SHIPPED | OUTPATIENT
Start: 2019-05-16 | End: 2019-09-05 | Stop reason: ALTCHOICE

## 2019-05-17 ENCOUNTER — APPOINTMENT (OUTPATIENT)
Dept: ULTRASOUND IMAGING | Facility: HOSPITAL | Age: 69
End: 2019-05-17

## 2019-05-17 ENCOUNTER — TELEPHONE (OUTPATIENT)
Dept: INTERNAL MEDICINE | Facility: CLINIC | Age: 69
End: 2019-05-17

## 2019-05-17 ENCOUNTER — APPOINTMENT (OUTPATIENT)
Dept: MAMMOGRAPHY | Facility: HOSPITAL | Age: 69
End: 2019-05-17

## 2019-05-17 ENCOUNTER — RESULTS ENCOUNTER (OUTPATIENT)
Dept: INTERNAL MEDICINE | Facility: CLINIC | Age: 69
End: 2019-05-17

## 2019-05-17 DIAGNOSIS — R82.90 URINE ABNORMALITY: Primary | ICD-10-CM

## 2019-05-17 DIAGNOSIS — R82.90 URINE ABNORMALITY: ICD-10-CM

## 2019-05-17 NOTE — TELEPHONE ENCOUNTER
----- Message from Frank Mustafa MD sent at 5/12/2019  8:47 PM EDT -----  Please call the patient regarding her abnormal result.  Please notify patient her diabetes is now out of control with A1c of 10.7 and 6 months ago it was 8.5 and have her follow-up with endocrinology for poorly controlled diabetes and she does not have a urinary tract infection per se but have her come in for repeat urine analysis and urine culture and sensitivity

## 2019-05-23 NOTE — PROGRESS NOTES
Subjective   Marlyn Rosario is a 69 y.o. female.   She is here to follow-up for mixed hyperlipidemia nonalcoholic fatty liver disease type 2 diabetes CRYSTAL and visit for screening mammogram and polyneuropathy  History of Present Illness   She is here today follow-up for mixed hyper lipidemia which has been stable on current medication nonalcoholic fatty liver disease which we will find out if stable with ultrasound of liver type 2 diabetes which has been not well controlled and visit for screening mammogram for which she refuses breast exam today and polyneuropathy which is stable and CRYSTAL which is stable on current medication  The following portions of the patient's history were reviewed and updated as appropriate: allergies, current medications, past family history, past medical history, past social history, past surgical history and problem list.    Review of Systems   Constitutional: Negative for fatigue.   Genitourinary: Negative for difficulty urinating.   Neurological: Negative for weakness.   Psychiatric/Behavioral: Negative for decreased concentration and dysphoric mood.   All other systems reviewed and are negative.      Objective   Physical Exam   Constitutional: She is oriented to person, place, and time. She appears well-developed and well-nourished. She is cooperative.   HENT:   Head: Normocephalic and atraumatic.   Right Ear: Hearing, tympanic membrane, external ear and ear canal normal.   Left Ear: Hearing, tympanic membrane, external ear and ear canal normal.   Nose: Nose normal.   Mouth/Throat: Uvula is midline, oropharynx is clear and moist and mucous membranes are normal.   Eyes: Conjunctivae, EOM and lids are normal. Pupils are equal, round, and reactive to light.   Neck: Phonation normal. Neck supple. Carotid bruit is not present.   Cardiovascular: Normal rate, regular rhythm and normal heart sounds. Exam reveals no gallop and no friction rub.   No murmur heard.  Pulmonary/Chest: Effort normal  and breath sounds normal. No respiratory distress.   Abdominal: Soft. Bowel sounds are normal. She exhibits no distension and no mass. There is no hepatosplenomegaly. There is no tenderness. There is no rebound and no guarding. No hernia.   Musculoskeletal: She exhibits no edema.   Neurological: She is alert and oriented to person, place, and time. Coordination and gait normal.   Skin: Skin is warm and dry.   Psychiatric: She has a normal mood and affect. Her speech is normal and behavior is normal. Judgment and thought content normal.   Nursing note and vitals reviewed.      Assessment/Plan   Diagnoses and all orders for this visit:    Mixed hyperlipidemia    Nonalcoholic fatty liver disease  -     US Liver    Type 2 diabetes mellitus with diabetic polyneuropathy, with long-term current use of insulin (CMS/[facility])  -     Hemoglobin A1c  -     CBC & Differential  -     Comprehensive Metabolic Panel  -     T4, Free  -     TSH  -     Urinalysis With Microscopic - Urine, Clean Catch  -     MicroAlbumin, Urine, Random - Urine, Clean Catch    Generalized anxiety disorder    Visit for screening mammogram  -     Mammo Screening Digital Tomosynthesis Bilateral With CAD; Future    Other polyneuropathy    Other orders  -     Pneumococcal Polysaccharide Vaccine 23-Valent Greater Than or Equal To 3yo Subcutaneous / IM  -     lisinopril (PRINIVIL,ZESTRIL) 10 MG tablet; Take 1 tablet by mouth Daily.  -     fluticasone (FLONASE) 50 MCG/ACT nasal spray; 2 sprays into the nostril(s) as directed by provider Daily.  -     Microscopic Examination -      Mixed hyperlipidemia has been stable on current medication  Nonalcoholic fatty liver disease hopefully stable we will get ultrasound liver  Type 2 diabetes not as well-controlled as I would like and we will check labs  CRYSTAL stable on current medication  Visit for screening mammogram we will schedule  Polyneuropathy about the same as before

## 2019-05-27 LAB
APPEARANCE UR: ABNORMAL
BACTERIA #/AREA URNS HPF: ABNORMAL /HPF
BACTERIA UR CULT: NORMAL
BACTERIA UR CULT: NORMAL
BILIRUB UR QL STRIP: NEGATIVE
COLOR UR: YELLOW
CRYSTALS URNS MICRO: ABNORMAL
EPI CELLS #/AREA URNS HPF: ABNORMAL /HPF
GLUCOSE UR QL: ABNORMAL
HGB UR QL STRIP: ABNORMAL
KETONES UR QL STRIP: NEGATIVE
LEUKOCYTE ESTERASE UR QL STRIP: ABNORMAL
MICRO URNS: ABNORMAL
MUCOUS THREADS URNS QL MICRO: PRESENT /HPF
NITRITE UR QL STRIP: POSITIVE
PH UR STRIP: 5.5 [PH] (ref 5–7.5)
PROT UR QL STRIP: ABNORMAL
RBC #/AREA URNS HPF: ABNORMAL /HPF
SP GR UR: 1.02 (ref 1–1.03)
UNIDENT CRYS URNS QL MICRO: PRESENT /LPF
URINALYSIS REFLEX: ABNORMAL
UROBILINOGEN UR STRIP-MCNC: 0.2 MG/DL (ref 0.2–1)
WBC #/AREA URNS HPF: >30 /HPF

## 2019-05-28 ENCOUNTER — TELEPHONE (OUTPATIENT)
Dept: INTERNAL MEDICINE | Facility: CLINIC | Age: 69
End: 2019-05-28

## 2019-05-28 NOTE — TELEPHONE ENCOUNTER
----- Message from Frank Mustafa MD sent at 5/28/2019  9:38 AM EDT -----  Please call the patient regarding her abnormal result.notify her she has UTI and send in nitrofurantoin 100 BID for 10 days

## 2019-06-10 ENCOUNTER — APPOINTMENT (OUTPATIENT)
Dept: ULTRASOUND IMAGING | Facility: HOSPITAL | Age: 69
End: 2019-06-10

## 2019-06-10 ENCOUNTER — APPOINTMENT (OUTPATIENT)
Dept: MAMMOGRAPHY | Facility: HOSPITAL | Age: 69
End: 2019-06-10

## 2019-06-16 ENCOUNTER — APPOINTMENT (OUTPATIENT)
Dept: CT IMAGING | Facility: HOSPITAL | Age: 69
End: 2019-06-16

## 2019-06-16 ENCOUNTER — HOSPITAL ENCOUNTER (EMERGENCY)
Facility: HOSPITAL | Age: 69
Discharge: HOME | End: 2019-06-16
Attending: EMERGENCY MEDICINE | Admitting: EMERGENCY MEDICINE

## 2019-06-16 VITALS
TEMPERATURE: 98 F | HEART RATE: 64 BPM | HEIGHT: 65 IN | RESPIRATION RATE: 16 BRPM | DIASTOLIC BLOOD PRESSURE: 63 MMHG | SYSTOLIC BLOOD PRESSURE: 149 MMHG | BODY MASS INDEX: 28.16 KG/M2 | OXYGEN SATURATION: 96 % | WEIGHT: 169 LBS

## 2019-06-16 DIAGNOSIS — R11.2 NON-INTRACTABLE VOMITING WITH NAUSEA, UNSPECIFIED VOMITING TYPE: ICD-10-CM

## 2019-06-16 DIAGNOSIS — R19.7 DIARRHEA, UNSPECIFIED TYPE: ICD-10-CM

## 2019-06-16 DIAGNOSIS — R10.11 RIGHT UPPER QUADRANT ABDOMINAL PAIN: Primary | ICD-10-CM

## 2019-06-16 LAB
ALBUMIN SERPL-MCNC: 4.2 G/DL (ref 3.5–5.2)
ALBUMIN/GLOB SERPL: 1.6 G/DL
ALP SERPL-CCNC: 118 U/L (ref 39–117)
ALT SERPL W P-5'-P-CCNC: 25 U/L (ref 1–33)
ANION GAP SERPL CALCULATED.3IONS-SCNC: 11.2 MMOL/L
AST SERPL-CCNC: 24 U/L (ref 1–32)
BACTERIA UR QL AUTO: ABNORMAL /HPF
BASOPHILS # BLD AUTO: 0.04 10*3/MM3 (ref 0–0.2)
BASOPHILS NFR BLD AUTO: 1 % (ref 0–1.5)
BILIRUB SERPL-MCNC: 0.4 MG/DL (ref 0.2–1.2)
BILIRUB UR QL STRIP: NEGATIVE
BUN BLD-MCNC: 4 MG/DL (ref 8–23)
BUN/CREAT SERPL: 6.6 (ref 7–25)
CALCIUM SPEC-SCNC: 9.1 MG/DL (ref 8.6–10.5)
CHLORIDE SERPL-SCNC: 98 MMOL/L (ref 98–107)
CLARITY UR: CLEAR
CO2 SERPL-SCNC: 29.8 MMOL/L (ref 22–29)
COLOR UR: YELLOW
CREAT BLD-MCNC: 0.61 MG/DL (ref 0.57–1)
DEPRECATED RDW RBC AUTO: 40.7 FL (ref 37–54)
EOSINOPHIL # BLD AUTO: 0 10*3/MM3 (ref 0–0.4)
EOSINOPHIL NFR BLD AUTO: 0 % (ref 0.3–6.2)
ERYTHROCYTE [DISTWIDTH] IN BLOOD BY AUTOMATED COUNT: 12.7 % (ref 12.3–15.4)
GFR SERPL CREATININE-BSD FRML MDRD: 98 ML/MIN/1.73
GLOBULIN UR ELPH-MCNC: 2.7 GM/DL
GLUCOSE BLD-MCNC: 194 MG/DL (ref 65–99)
GLUCOSE UR STRIP-MCNC: ABNORMAL MG/DL
HCT VFR BLD AUTO: 40.1 % (ref 34–46.6)
HGB BLD-MCNC: 13 G/DL (ref 12–15.9)
HGB UR QL STRIP.AUTO: NEGATIVE
HOLD SPECIMEN: NORMAL
HYALINE CASTS UR QL AUTO: ABNORMAL /LPF
IMM GRANULOCYTES # BLD AUTO: 0.02 10*3/MM3 (ref 0–0.05)
IMM GRANULOCYTES NFR BLD AUTO: 0.5 % (ref 0–0.5)
KETONES UR QL STRIP: NEGATIVE
LEUKOCYTE ESTERASE UR QL STRIP.AUTO: ABNORMAL
LIPASE SERPL-CCNC: 20 U/L (ref 13–60)
LYMPHOCYTES # BLD AUTO: 0.58 10*3/MM3 (ref 0.7–3.1)
LYMPHOCYTES NFR BLD AUTO: 14.7 % (ref 19.6–45.3)
MCH RBC QN AUTO: 28.6 PG (ref 26.6–33)
MCHC RBC AUTO-ENTMCNC: 32.4 G/DL (ref 31.5–35.7)
MCV RBC AUTO: 88.1 FL (ref 79–97)
MONOCYTES # BLD AUTO: 0.3 10*3/MM3 (ref 0.1–0.9)
MONOCYTES NFR BLD AUTO: 7.6 % (ref 5–12)
NEUTROPHILS # BLD AUTO: 3.01 10*3/MM3 (ref 1.7–7)
NEUTROPHILS NFR BLD AUTO: 76.2 % (ref 42.7–76)
NITRITE UR QL STRIP: NEGATIVE
NRBC BLD AUTO-RTO: 0 /100 WBC (ref 0–0.2)
PH UR STRIP.AUTO: 7 [PH] (ref 5–8)
PLATELET # BLD AUTO: 191 10*3/MM3 (ref 140–450)
PMV BLD AUTO: 10.6 FL (ref 6–12)
POTASSIUM BLD-SCNC: 4 MMOL/L (ref 3.5–5.2)
PROT SERPL-MCNC: 6.9 G/DL (ref 6–8.5)
PROT UR QL STRIP: ABNORMAL
RBC # BLD AUTO: 4.55 10*6/MM3 (ref 3.77–5.28)
RBC # UR: ABNORMAL /HPF
REF LAB TEST METHOD: ABNORMAL
SODIUM BLD-SCNC: 139 MMOL/L (ref 136–145)
SP GR UR STRIP: 1.01 (ref 1–1.03)
SQUAMOUS #/AREA URNS HPF: ABNORMAL /HPF
UROBILINOGEN UR QL STRIP: ABNORMAL
WBC NRBC COR # BLD: 3.95 10*3/MM3 (ref 3.4–10.8)
WBC UR QL AUTO: ABNORMAL /HPF
WHOLE BLOOD HOLD SPECIMEN: NORMAL
WHOLE BLOOD HOLD SPECIMEN: NORMAL

## 2019-06-16 PROCEDURE — 85025 COMPLETE CBC W/AUTO DIFF WBC: CPT | Performed by: EMERGENCY MEDICINE

## 2019-06-16 PROCEDURE — 74177 CT ABD & PELVIS W/CONTRAST: CPT

## 2019-06-16 PROCEDURE — 93005 ELECTROCARDIOGRAM TRACING: CPT | Performed by: EMERGENCY MEDICINE

## 2019-06-16 PROCEDURE — 93010 ELECTROCARDIOGRAM REPORT: CPT | Performed by: INTERNAL MEDICINE

## 2019-06-16 PROCEDURE — 81001 URINALYSIS AUTO W/SCOPE: CPT | Performed by: EMERGENCY MEDICINE

## 2019-06-16 PROCEDURE — 83690 ASSAY OF LIPASE: CPT | Performed by: EMERGENCY MEDICINE

## 2019-06-16 PROCEDURE — 25010000002 IOPAMIDOL 61 % SOLUTION: Performed by: EMERGENCY MEDICINE

## 2019-06-16 PROCEDURE — 25010000002 HYDROMORPHONE PER 4 MG: Performed by: EMERGENCY MEDICINE

## 2019-06-16 PROCEDURE — 96374 THER/PROPH/DIAG INJ IV PUSH: CPT

## 2019-06-16 PROCEDURE — 80053 COMPREHEN METABOLIC PANEL: CPT | Performed by: EMERGENCY MEDICINE

## 2019-06-16 PROCEDURE — 25010000002 ONDANSETRON PER 1 MG: Performed by: EMERGENCY MEDICINE

## 2019-06-16 PROCEDURE — 96375 TX/PRO/DX INJ NEW DRUG ADDON: CPT

## 2019-06-16 PROCEDURE — 99285 EMERGENCY DEPT VISIT HI MDM: CPT

## 2019-06-16 RX ORDER — HYDROMORPHONE HYDROCHLORIDE 1 MG/ML
0.5 INJECTION, SOLUTION INTRAMUSCULAR; INTRAVENOUS; SUBCUTANEOUS ONCE
Status: COMPLETED | OUTPATIENT
Start: 2019-06-16 | End: 2019-06-16

## 2019-06-16 RX ORDER — ONDANSETRON 2 MG/ML
4 INJECTION INTRAMUSCULAR; INTRAVENOUS ONCE
Status: COMPLETED | OUTPATIENT
Start: 2019-06-16 | End: 2019-06-16

## 2019-06-16 RX ORDER — ONDANSETRON 4 MG/1
4 TABLET, ORALLY DISINTEGRATING ORAL EVERY 8 HOURS PRN
Qty: 15 TABLET | Refills: 0 | Status: SHIPPED | OUTPATIENT
Start: 2019-06-16 | End: 2019-06-21

## 2019-06-16 RX ORDER — SODIUM CHLORIDE 0.9 % (FLUSH) 0.9 %
10 SYRINGE (ML) INJECTION AS NEEDED
Status: DISCONTINUED | OUTPATIENT
Start: 2019-06-16 | End: 2019-06-16 | Stop reason: HOSPADM

## 2019-06-16 RX ORDER — ALPRAZOLAM 0.25 MG/1
1 TABLET ORAL ONCE
Status: COMPLETED | OUTPATIENT
Start: 2019-06-16 | End: 2019-06-16

## 2019-06-16 RX ADMIN — ALPRAZOLAM 1 MG: 0.25 TABLET ORAL at 07:40

## 2019-06-16 RX ADMIN — ONDANSETRON 4 MG: 2 INJECTION INTRAMUSCULAR; INTRAVENOUS at 07:40

## 2019-06-16 RX ADMIN — IOPAMIDOL 85 ML: 612 INJECTION, SOLUTION INTRAVENOUS at 08:53

## 2019-06-16 RX ADMIN — SODIUM CHLORIDE, POTASSIUM CHLORIDE, SODIUM LACTATE AND CALCIUM CHLORIDE 1000 ML: 600; 310; 30; 20 INJECTION, SOLUTION INTRAVENOUS at 07:40

## 2019-06-16 RX ADMIN — HYDROMORPHONE HYDROCHLORIDE 0.5 MG: 1 INJECTION, SOLUTION INTRAMUSCULAR; INTRAVENOUS; SUBCUTANEOUS at 07:40

## 2019-06-17 RX ORDER — NITROFURANTOIN MACROCRYSTALS 50 MG/1
50 CAPSULE ORAL NIGHTLY
Qty: 90 CAPSULE | Refills: 0 | OUTPATIENT
Start: 2019-06-17

## 2019-06-18 RX ORDER — FLUCONAZOLE 150 MG/1
150 TABLET ORAL ONCE
Qty: 1 TABLET | Refills: 0 | Status: SHIPPED | OUTPATIENT
Start: 2019-06-18 | End: 2019-06-18

## 2019-06-19 ENCOUNTER — TELEPHONE (OUTPATIENT)
Dept: INTERNAL MEDICINE | Facility: CLINIC | Age: 69
End: 2019-06-19

## 2019-06-19 NOTE — TELEPHONE ENCOUNTER
Pt stated she was having a severe yeast infection. I adised the patient she could come in to see Lanette Moya when she returns to the office. Or she can make a an appointment with her OBGYN. Pt stated she did not have that special doctor,  I advised the Pt she could go to an [facility].

## 2019-07-15 RX ORDER — ALPRAZOLAM 1 MG/1
TABLET ORAL
Qty: 120 TABLET | Refills: 0 | Status: SHIPPED | OUTPATIENT
Start: 2019-07-15 | End: 2019-09-05

## 2019-09-05 ENCOUNTER — HOSPITAL ENCOUNTER (OUTPATIENT)
Facility: HOSPITAL | Age: 69
Setting detail: OBSERVATION
Discharge: HOME | End: 2019-09-07
Attending: EMERGENCY MEDICINE | Admitting: FAMILY MEDICINE

## 2019-09-05 ENCOUNTER — APPOINTMENT (OUTPATIENT)
Dept: CT IMAGING | Facility: HOSPITAL | Age: 69
End: 2019-09-05

## 2019-09-05 ENCOUNTER — APPOINTMENT (OUTPATIENT)
Dept: GENERAL RADIOLOGY | Facility: HOSPITAL | Age: 69
End: 2019-09-05

## 2019-09-05 DIAGNOSIS — F11.90 OPIATE USE: ICD-10-CM

## 2019-09-05 DIAGNOSIS — G93.41 METABOLIC ENCEPHALOPATHY: ICD-10-CM

## 2019-09-05 DIAGNOSIS — Z79.899 CHRONIC PRESCRIPTION BENZODIAZEPINE USE: ICD-10-CM

## 2019-09-05 DIAGNOSIS — N39.0 ACUTE UTI: Primary | ICD-10-CM

## 2019-09-05 LAB
ABO GROUP BLD: NORMAL
ALBUMIN SERPL-MCNC: 4.3 G/DL (ref 3.5–5.2)
ALBUMIN/GLOB SERPL: 1.5 G/DL
ALP SERPL-CCNC: 150 U/L (ref 39–117)
ALT SERPL W P-5'-P-CCNC: 47 U/L (ref 1–33)
AMPHET+METHAMPHET UR QL: NEGATIVE
ANION GAP SERPL CALCULATED.3IONS-SCNC: 13.3 MMOL/L (ref 5–15)
ANTI-D: NORMAL
APTT PPP: 25.4 SECONDS (ref 22.7–35.4)
AST SERPL-CCNC: 27 U/L (ref 1–32)
BACTERIA UR QL AUTO: ABNORMAL /HPF
BARBITURATES UR QL SCN: NEGATIVE
BASOPHILS # BLD AUTO: 0.03 10*3/MM3 (ref 0–0.2)
BASOPHILS NFR BLD AUTO: 0.4 % (ref 0–1.5)
BENZODIAZ UR QL SCN: POSITIVE
BILIRUB SERPL-MCNC: 0.3 MG/DL (ref 0.2–1.2)
BILIRUB UR QL STRIP: NEGATIVE
BLD GP AB SCN SERPL QL: POSITIVE
BUN BLD-MCNC: 10 MG/DL (ref 8–23)
BUN/CREAT SERPL: 14.9 (ref 7–25)
CALCIUM SPEC-SCNC: 9.1 MG/DL (ref 8.6–10.5)
CANNABINOIDS SERPL QL: NEGATIVE
CHLORIDE SERPL-SCNC: 95 MMOL/L (ref 98–107)
CHROMATIN AB SERPL-ACNC: <10 IU/ML (ref 0–14)
CK SERPL-CCNC: 91 U/L (ref 20–180)
CLARITY UR: ABNORMAL
CO2 SERPL-SCNC: 26.7 MMOL/L (ref 22–29)
COCAINE UR QL: NEGATIVE
COLOR UR: YELLOW
CREAT BLD-MCNC: 0.67 MG/DL (ref 0.57–1)
CRP SERPL-MCNC: 0.46 MG/DL (ref 0.01–0.5)
CRP SERPL-MCNC: 0.51 MG/DL (ref 0–0.5)
D DIMER PPP FEU-MCNC: <0.27 MCGFEU/ML (ref 0–0.49)
D-LACTATE SERPL-SCNC: 1.8 MMOL/L (ref 0.5–2)
DEPRECATED RDW RBC AUTO: 43.9 FL (ref 37–54)
EOSINOPHIL # BLD AUTO: 0 10*3/MM3 (ref 0–0.4)
EOSINOPHIL NFR BLD AUTO: 0 % (ref 0.3–6.2)
ERYTHROCYTE [DISTWIDTH] IN BLOOD BY AUTOMATED COUNT: 13.3 % (ref 12.3–15.4)
ERYTHROCYTE [SEDIMENTATION RATE] IN BLOOD: 22 MM/HR (ref 0–30)
ETHANOL BLD-MCNC: <10 MG/DL (ref 0–10)
ETHANOL UR QL: <0.01 %
FERRITIN SERPL-MCNC: 36 NG/ML (ref 13–150)
FIBRINOGEN PPP-MCNC: 418 MG/DL (ref 219–464)
FOLATE SERPL-MCNC: 19 NG/ML (ref 4.78–24.2)
GFR SERPL CREATININE-BSD FRML MDRD: 88 ML/MIN/1.73
GLOBULIN UR ELPH-MCNC: 2.8 GM/DL
GLUCOSE BLD-MCNC: 240 MG/DL (ref 65–99)
GLUCOSE BLDC GLUCOMTR-MCNC: 162 MG/DL (ref 70–130)
GLUCOSE BLDC GLUCOMTR-MCNC: 173 MG/DL (ref 70–130)
GLUCOSE BLDC GLUCOMTR-MCNC: 182 MG/DL (ref 70–130)
GLUCOSE UR STRIP-MCNC: ABNORMAL MG/DL
HCT VFR BLD AUTO: 42.2 % (ref 34–46.6)
HGB BLD-MCNC: 13.8 G/DL (ref 12–15.9)
HGB UR QL STRIP.AUTO: ABNORMAL
HOLD SPECIMEN: NORMAL
HOLD SPECIMEN: NORMAL
HYALINE CASTS UR QL AUTO: ABNORMAL /LPF
IMM GRANULOCYTES # BLD AUTO: 0.03 10*3/MM3 (ref 0–0.05)
IMM GRANULOCYTES NFR BLD AUTO: 0.4 % (ref 0–0.5)
INR PPP: 1.03 (ref 0.9–1.1)
INR PPP: 1.04 (ref 0.9–1.1)
IRON 24H UR-MRATE: 56 MCG/DL (ref 37–145)
IRON SATN MFR SERPL: 12 % (ref 20–50)
KETONES UR QL STRIP: NEGATIVE
LEUKOCYTE ESTERASE UR QL STRIP.AUTO: ABNORMAL
LYMPHOCYTES # BLD AUTO: 0.46 10*3/MM3 (ref 0.7–3.1)
LYMPHOCYTES NFR BLD AUTO: 6.8 % (ref 19.6–45.3)
MAGNESIUM SERPL-MCNC: 1.6 MG/DL (ref 1.6–2.4)
MCH RBC QN AUTO: 29.6 PG (ref 26.6–33)
MCHC RBC AUTO-ENTMCNC: 32.7 G/DL (ref 31.5–35.7)
MCV RBC AUTO: 90.4 FL (ref 79–97)
METHADONE UR QL SCN: NEGATIVE
MONOCYTES # BLD AUTO: 0.4 10*3/MM3 (ref 0.1–0.9)
MONOCYTES NFR BLD AUTO: 5.9 % (ref 5–12)
NEUTROPHILS # BLD AUTO: 5.85 10*3/MM3 (ref 1.7–7)
NEUTROPHILS NFR BLD AUTO: 86.5 % (ref 42.7–76)
NITRITE UR QL STRIP: NEGATIVE
NRBC BLD AUTO-RTO: 0 /100 WBC (ref 0–0.2)
NT-PROBNP SERPL-MCNC: 48.9 PG/ML (ref 5–900)
OPIATES UR QL: NEGATIVE
OXYCODONE UR QL SCN: POSITIVE
PH UR STRIP.AUTO: 5.5 [PH] (ref 5–8)
PLATELET # BLD AUTO: 242 10*3/MM3 (ref 140–450)
PMV BLD AUTO: 10.1 FL (ref 6–12)
POTASSIUM BLD-SCNC: 4 MMOL/L (ref 3.5–5.2)
PROCALCITONIN SERPL-MCNC: <0.02 NG/ML (ref 0.1–0.25)
PROT SERPL-MCNC: 7.1 G/DL (ref 6–8.5)
PROT UR QL STRIP: NEGATIVE
PROTHROMBIN TIME: 13.2 SECONDS (ref 11.7–14.2)
PROTHROMBIN TIME: 13.3 SECONDS (ref 11.7–14.2)
RBC # BLD AUTO: 4.67 10*6/MM3 (ref 3.77–5.28)
RBC # UR: ABNORMAL /HPF
REF LAB TEST METHOD: ABNORMAL
RETICS # AUTO: 0.06 10*6/MM3 (ref 0.02–0.13)
RETICS/RBC NFR AUTO: 1.33 % (ref 0.7–1.9)
RH BLD: NEGATIVE
SODIUM BLD-SCNC: 135 MMOL/L (ref 136–145)
SP GR UR STRIP: 1.01 (ref 1–1.03)
SQUAMOUS #/AREA URNS HPF: ABNORMAL /HPF
T&S EXPIRATION DATE: NORMAL
TIBC SERPL-MCNC: 471 MCG/DL (ref 298–536)
TRANSFERRIN SERPL-MCNC: 316 MG/DL (ref 200–360)
TROPONIN T SERPL-MCNC: <0.01 NG/ML (ref 0–0.03)
TROPONIN T SERPL-MCNC: <0.01 NG/ML (ref 0–0.03)
URATE SERPL-MCNC: 2.7 MG/DL (ref 2.4–5.7)
UROBILINOGEN UR QL STRIP: ABNORMAL
VIT B12 BLD-MCNC: 441 PG/ML (ref 211–946)
WBC NRBC COR # BLD: 6.77 10*3/MM3 (ref 3.4–10.8)
WBC UR QL AUTO: ABNORMAL /HPF
WHOLE BLOOD HOLD SPECIMEN: NORMAL
WHOLE BLOOD HOLD SPECIMEN: NORMAL

## 2019-09-05 PROCEDURE — 82746 ASSAY OF FOLIC ACID SERUM: CPT | Performed by: FAMILY MEDICINE

## 2019-09-05 PROCEDURE — 86140 C-REACTIVE PROTEIN: CPT | Performed by: FAMILY MEDICINE

## 2019-09-05 PROCEDURE — 86141 C-REACTIVE PROTEIN HS: CPT | Performed by: FAMILY MEDICINE

## 2019-09-05 PROCEDURE — 87040 BLOOD CULTURE FOR BACTERIA: CPT | Performed by: EMERGENCY MEDICINE

## 2019-09-05 PROCEDURE — 93005 ELECTROCARDIOGRAM TRACING: CPT | Performed by: EMERGENCY MEDICINE

## 2019-09-05 PROCEDURE — 82728 ASSAY OF FERRITIN: CPT | Performed by: FAMILY MEDICINE

## 2019-09-05 PROCEDURE — 86850 RBC ANTIBODY SCREEN: CPT | Performed by: FAMILY MEDICINE

## 2019-09-05 PROCEDURE — 96361 HYDRATE IV INFUSION ADD-ON: CPT

## 2019-09-05 PROCEDURE — 84145 PROCALCITONIN (PCT): CPT | Performed by: EMERGENCY MEDICINE

## 2019-09-05 PROCEDURE — 84466 ASSAY OF TRANSFERRIN: CPT | Performed by: FAMILY MEDICINE

## 2019-09-05 PROCEDURE — 86800 THYROGLOBULIN ANTIBODY: CPT | Performed by: FAMILY MEDICINE

## 2019-09-05 PROCEDURE — 83735 ASSAY OF MAGNESIUM: CPT | Performed by: EMERGENCY MEDICINE

## 2019-09-05 PROCEDURE — 82962 GLUCOSE BLOOD TEST: CPT

## 2019-09-05 PROCEDURE — 80307 DRUG TEST PRSMV CHEM ANLYZR: CPT | Performed by: EMERGENCY MEDICINE

## 2019-09-05 PROCEDURE — 86870 RBC ANTIBODY IDENTIFICATION: CPT | Performed by: FAMILY MEDICINE

## 2019-09-05 PROCEDURE — 80053 COMPREHEN METABOLIC PANEL: CPT | Performed by: EMERGENCY MEDICINE

## 2019-09-05 PROCEDURE — 85730 THROMBOPLASTIN TIME PARTIAL: CPT | Performed by: FAMILY MEDICINE

## 2019-09-05 PROCEDURE — 84484 ASSAY OF TROPONIN QUANT: CPT | Performed by: EMERGENCY MEDICINE

## 2019-09-05 PROCEDURE — G0378 HOSPITAL OBSERVATION PER HR: HCPCS

## 2019-09-05 PROCEDURE — 84432 ASSAY OF THYROGLOBULIN: CPT | Performed by: FAMILY MEDICINE

## 2019-09-05 PROCEDURE — 86376 MICROSOMAL ANTIBODY EACH: CPT | Performed by: FAMILY MEDICINE

## 2019-09-05 PROCEDURE — 85045 AUTOMATED RETICULOCYTE COUNT: CPT | Performed by: FAMILY MEDICINE

## 2019-09-05 PROCEDURE — 86922 COMPATIBILITY TEST ANTIGLOB: CPT

## 2019-09-05 PROCEDURE — 25010000003 CEFTRIAXONE PER 250 MG: Performed by: EMERGENCY MEDICINE

## 2019-09-05 PROCEDURE — 74177 CT ABD & PELVIS W/CONTRAST: CPT

## 2019-09-05 PROCEDURE — 85652 RBC SED RATE AUTOMATED: CPT | Performed by: FAMILY MEDICINE

## 2019-09-05 PROCEDURE — 87086 URINE CULTURE/COLONY COUNT: CPT | Performed by: EMERGENCY MEDICINE

## 2019-09-05 PROCEDURE — 93010 ELECTROCARDIOGRAM REPORT: CPT | Performed by: INTERNAL MEDICINE

## 2019-09-05 PROCEDURE — 71045 X-RAY EXAM CHEST 1 VIEW: CPT

## 2019-09-05 PROCEDURE — 96365 THER/PROPH/DIAG IV INF INIT: CPT

## 2019-09-05 PROCEDURE — 83540 ASSAY OF IRON: CPT | Performed by: FAMILY MEDICINE

## 2019-09-05 PROCEDURE — 73030 X-RAY EXAM OF SHOULDER: CPT

## 2019-09-05 PROCEDURE — 82747 ASSAY OF FOLIC ACID RBC: CPT | Performed by: FAMILY MEDICINE

## 2019-09-05 PROCEDURE — 82550 ASSAY OF CK (CPK): CPT | Performed by: FAMILY MEDICINE

## 2019-09-05 PROCEDURE — 25010000002 ENOXAPARIN PER 10 MG: Performed by: FAMILY MEDICINE

## 2019-09-05 PROCEDURE — 82607 VITAMIN B-12: CPT | Performed by: FAMILY MEDICINE

## 2019-09-05 PROCEDURE — 85384 FIBRINOGEN ACTIVITY: CPT | Performed by: FAMILY MEDICINE

## 2019-09-05 PROCEDURE — 99285 EMERGENCY DEPT VISIT HI MDM: CPT

## 2019-09-05 PROCEDURE — 93005 ELECTROCARDIOGRAM TRACING: CPT | Performed by: NURSE PRACTITIONER

## 2019-09-05 PROCEDURE — 83525 ASSAY OF INSULIN: CPT | Performed by: FAMILY MEDICINE

## 2019-09-05 PROCEDURE — 83880 ASSAY OF NATRIURETIC PEPTIDE: CPT | Performed by: FAMILY MEDICINE

## 2019-09-05 PROCEDURE — 96372 THER/PROPH/DIAG INJ SC/IM: CPT

## 2019-09-05 PROCEDURE — 84681 ASSAY OF C-PEPTIDE: CPT | Performed by: FAMILY MEDICINE

## 2019-09-05 PROCEDURE — 70450 CT HEAD/BRAIN W/O DYE: CPT

## 2019-09-05 PROCEDURE — 85610 PROTHROMBIN TIME: CPT | Performed by: EMERGENCY MEDICINE

## 2019-09-05 PROCEDURE — 86901 BLOOD TYPING SEROLOGIC RH(D): CPT | Performed by: FAMILY MEDICINE

## 2019-09-05 PROCEDURE — 85610 PROTHROMBIN TIME: CPT | Performed by: FAMILY MEDICINE

## 2019-09-05 PROCEDURE — 36415 COLL VENOUS BLD VENIPUNCTURE: CPT | Performed by: FAMILY MEDICINE

## 2019-09-05 PROCEDURE — 85025 COMPLETE CBC W/AUTO DIFF WBC: CPT | Performed by: EMERGENCY MEDICINE

## 2019-09-05 PROCEDURE — 25010000002 IOPAMIDOL 61 % SOLUTION: Performed by: EMERGENCY MEDICINE

## 2019-09-05 PROCEDURE — 87077 CULTURE AEROBIC IDENTIFY: CPT | Performed by: EMERGENCY MEDICINE

## 2019-09-05 PROCEDURE — 86920 COMPATIBILITY TEST SPIN: CPT

## 2019-09-05 PROCEDURE — 84484 ASSAY OF TROPONIN QUANT: CPT | Performed by: FAMILY MEDICINE

## 2019-09-05 PROCEDURE — 83605 ASSAY OF LACTIC ACID: CPT | Performed by: EMERGENCY MEDICINE

## 2019-09-05 PROCEDURE — 86038 ANTINUCLEAR ANTIBODIES: CPT | Performed by: FAMILY MEDICINE

## 2019-09-05 PROCEDURE — 81001 URINALYSIS AUTO W/SCOPE: CPT | Performed by: EMERGENCY MEDICINE

## 2019-09-05 PROCEDURE — 84550 ASSAY OF BLOOD/URIC ACID: CPT | Performed by: FAMILY MEDICINE

## 2019-09-05 PROCEDURE — 85379 FIBRIN DEGRADATION QUANT: CPT | Performed by: FAMILY MEDICINE

## 2019-09-05 PROCEDURE — 86431 RHEUMATOID FACTOR QUANT: CPT | Performed by: FAMILY MEDICINE

## 2019-09-05 PROCEDURE — 85014 HEMATOCRIT: CPT | Performed by: FAMILY MEDICINE

## 2019-09-05 PROCEDURE — 86900 BLOOD TYPING SEROLOGIC ABO: CPT | Performed by: FAMILY MEDICINE

## 2019-09-05 PROCEDURE — 87186 SC STD MICRODIL/AGAR DIL: CPT | Performed by: EMERGENCY MEDICINE

## 2019-09-05 RX ORDER — ASPIRIN 81 MG/1
81 TABLET ORAL DAILY
COMMUNITY

## 2019-09-05 RX ORDER — PIOGLITAZONEHYDROCHLORIDE 30 MG/1
30 TABLET ORAL DAILY
COMMUNITY

## 2019-09-05 RX ORDER — MULTIVIT WITH MINERALS/LUTEIN
1000 TABLET ORAL DAILY
Status: DISCONTINUED | OUTPATIENT
Start: 2019-09-06 | End: 2019-09-07 | Stop reason: HOSPADM

## 2019-09-05 RX ORDER — OXYCODONE HYDROCHLORIDE 15 MG/1
15 TABLET ORAL 4 TIMES DAILY
COMMUNITY

## 2019-09-05 RX ORDER — PANTOPRAZOLE SODIUM 40 MG/1
40 TABLET, DELAYED RELEASE ORAL
Status: DISCONTINUED | OUTPATIENT
Start: 2019-09-06 | End: 2019-09-07 | Stop reason: HOSPADM

## 2019-09-05 RX ORDER — SODIUM CHLORIDE 0.9 % (FLUSH) 0.9 %
10 SYRINGE (ML) INJECTION AS NEEDED
Status: DISCONTINUED | OUTPATIENT
Start: 2019-09-05 | End: 2019-09-07 | Stop reason: HOSPADM

## 2019-09-05 RX ORDER — POTASSIUM CHLORIDE 7.45 MG/ML
10 INJECTION INTRAVENOUS
Status: DISCONTINUED | OUTPATIENT
Start: 2019-09-05 | End: 2019-09-07 | Stop reason: HOSPADM

## 2019-09-05 RX ORDER — SODIUM CHLORIDE 0.9 % (FLUSH) 0.9 %
10 SYRINGE (ML) INJECTION EVERY 12 HOURS SCHEDULED
Status: DISCONTINUED | OUTPATIENT
Start: 2019-09-05 | End: 2019-09-07 | Stop reason: HOSPADM

## 2019-09-05 RX ORDER — MULTIVIT WITH MINERALS/LUTEIN
1000 TABLET ORAL DAILY
COMMUNITY

## 2019-09-05 RX ORDER — PRENATAL VIT NO.126/IRON/FOLIC 28MG-0.8MG
1 TABLET ORAL DAILY
Status: DISCONTINUED | OUTPATIENT
Start: 2019-09-06 | End: 2019-09-07 | Stop reason: HOSPADM

## 2019-09-05 RX ORDER — ALPRAZOLAM 1 MG/1
1 TABLET ORAL EVERY 8 HOURS PRN
COMMUNITY

## 2019-09-05 RX ORDER — POTASSIUM CHLORIDE 750 MG/1
40 CAPSULE, EXTENDED RELEASE ORAL AS NEEDED
Status: DISCONTINUED | OUTPATIENT
Start: 2019-09-05 | End: 2019-09-07 | Stop reason: HOSPADM

## 2019-09-05 RX ORDER — VALSARTAN 160 MG/1
160 TABLET ORAL
Status: DISCONTINUED | OUTPATIENT
Start: 2019-09-06 | End: 2019-09-07 | Stop reason: HOSPADM

## 2019-09-05 RX ORDER — SODIUM CHLORIDE 9 MG/ML
80 INJECTION, SOLUTION INTRAVENOUS CONTINUOUS
Status: DISCONTINUED | OUTPATIENT
Start: 2019-09-05 | End: 2019-09-07 | Stop reason: HOSPADM

## 2019-09-05 RX ORDER — INSULIN GLARGINE 100 [IU]/ML
30 INJECTION, SOLUTION SUBCUTANEOUS
Status: DISCONTINUED | OUTPATIENT
Start: 2019-09-06 | End: 2019-09-06

## 2019-09-05 RX ORDER — MAGNESIUM SULFATE HEPTAHYDRATE 40 MG/ML
2 INJECTION, SOLUTION INTRAVENOUS AS NEEDED
Status: DISCONTINUED | OUTPATIENT
Start: 2019-09-05 | End: 2019-09-07 | Stop reason: HOSPADM

## 2019-09-05 RX ORDER — CEFTRIAXONE SODIUM 2 G/50ML
2 INJECTION, SOLUTION INTRAVENOUS EVERY 24 HOURS
Status: DISCONTINUED | OUTPATIENT
Start: 2019-09-06 | End: 2019-09-07 | Stop reason: HOSPADM

## 2019-09-05 RX ORDER — FLUTICASONE PROPIONATE 50 MCG
2 SPRAY, SUSPENSION (ML) NASAL DAILY
Status: DISCONTINUED | OUTPATIENT
Start: 2019-09-05 | End: 2019-09-07 | Stop reason: HOSPADM

## 2019-09-05 RX ORDER — PIOGLITAZONEHYDROCHLORIDE 30 MG/1
30 TABLET ORAL DAILY
Status: DISCONTINUED | OUTPATIENT
Start: 2019-09-06 | End: 2019-09-07 | Stop reason: HOSPADM

## 2019-09-05 RX ORDER — ERGOCALCIFEROL 1.25 MG/1
50000 CAPSULE ORAL
Status: DISCONTINUED | OUTPATIENT
Start: 2019-09-05 | End: 2019-09-07 | Stop reason: HOSPADM

## 2019-09-05 RX ORDER — PIOGLITAZONEHYDROCHLORIDE 30 MG/1
30 TABLET ORAL DAILY
Status: DISCONTINUED | OUTPATIENT
Start: 2019-09-05 | End: 2019-09-05

## 2019-09-05 RX ORDER — CEFTRIAXONE SODIUM 2 G/50ML
2 INJECTION, SOLUTION INTRAVENOUS ONCE
Status: COMPLETED | OUTPATIENT
Start: 2019-09-05 | End: 2019-09-05

## 2019-09-05 RX ORDER — AMLODIPINE BESYLATE 5 MG/1
5 TABLET ORAL DAILY
COMMUNITY
End: 2019-09-07 | Stop reason: HOSPADM

## 2019-09-05 RX ORDER — ACETAMINOPHEN 325 MG/1
650 TABLET ORAL EVERY 4 HOURS PRN
Status: DISCONTINUED | OUTPATIENT
Start: 2019-09-05 | End: 2019-09-06

## 2019-09-05 RX ORDER — ESOMEPRAZOLE MAGNESIUM 40 MG/1
40 CAPSULE, DELAYED RELEASE ORAL DAILY
COMMUNITY
End: 2019-09-07 | Stop reason: HOSPADM

## 2019-09-05 RX ORDER — POTASSIUM CHLORIDE 1.5 G/1.77G
40 POWDER, FOR SOLUTION ORAL AS NEEDED
Status: DISCONTINUED | OUTPATIENT
Start: 2019-09-05 | End: 2019-09-07 | Stop reason: HOSPADM

## 2019-09-05 RX ORDER — ASPIRIN 81 MG/1
81 TABLET ORAL DAILY
Status: DISCONTINUED | OUTPATIENT
Start: 2019-09-06 | End: 2019-09-07 | Stop reason: HOSPADM

## 2019-09-05 RX ORDER — ESCITALOPRAM OXALATE 10 MG/1
10 TABLET ORAL DAILY
COMMUNITY

## 2019-09-05 RX ORDER — ESCITALOPRAM OXALATE 10 MG/1
10 TABLET ORAL DAILY
Status: DISCONTINUED | OUTPATIENT
Start: 2019-09-06 | End: 2019-09-07 | Stop reason: HOSPADM

## 2019-09-05 RX ORDER — LOSARTAN POTASSIUM 50 MG/1
50 TABLET ORAL DAILY
COMMUNITY
End: 2019-09-07 | Stop reason: HOSPADM

## 2019-09-05 RX ORDER — PRENATAL VIT NO.126/IRON/FOLIC 28MG-0.8MG
TABLET ORAL DAILY
COMMUNITY
End: 2020-01-01 | Stop reason: ALTCHOICE

## 2019-09-05 RX ORDER — AMLODIPINE BESYLATE 5 MG/1
5 TABLET ORAL DAILY
Status: DISCONTINUED | OUTPATIENT
Start: 2019-09-06 | End: 2019-09-07

## 2019-09-05 RX ADMIN — SODIUM CHLORIDE 125 ML/HR: 9 INJECTION, SOLUTION INTRAVENOUS at 11:35

## 2019-09-05 RX ADMIN — ENOXAPARIN SODIUM 40 MG: 40 INJECTION SUBCUTANEOUS at 20:41

## 2019-09-05 RX ADMIN — PIOGLITAZONE 30 MG: 30 TABLET ORAL at 18:51

## 2019-09-05 RX ADMIN — IOPAMIDOL 85 ML: 612 INJECTION, SOLUTION INTRAVENOUS at 12:00

## 2019-09-05 RX ADMIN — CEFTRIAXONE SODIUM 2 G: 2 INJECTION, SOLUTION INTRAVENOUS at 12:52

## 2019-09-05 RX ADMIN — LINAGLIPTIN 5 MG: 5 TABLET, FILM COATED ORAL at 18:51

## 2019-09-05 NOTE — ED NOTES
"\"feel awful\"  Pt states her legs are heavy, her R neck/shoulder hurt, R hand tingling.  States symptoms started 3 days ago.     Lai Pacheco RN  09/05/19 [id number]    "

## 2019-09-05 NOTE — PLAN OF CARE
Problem: Patient Care Overview  Goal: Plan of Care Review  Outcome: Ongoing (interventions implemented as appropriate)   09/05/19 9236   Coping/Psychosocial   Plan of Care Reviewed With patient   Plan of Care Review   Progress improving   OTHER   Outcome Summary Admitted for PRETTY, patient A&O, in pain, declines tylenol, VSS     Goal: Individualization and Mutuality  Outcome: Ongoing (interventions implemented as appropriate)    Goal: Discharge Needs Assessment  Outcome: Ongoing (interventions implemented as appropriate)    Goal: Interprofessional Rounds/Family Conf  Outcome: Ongoing (interventions implemented as appropriate)      Problem: Fall Risk (Adult)  Goal: Identify Related Risk Factors and Signs and Symptoms  Outcome: Ongoing (interventions implemented as appropriate)    Goal: Absence of Fall  Outcome: Ongoing (interventions implemented as appropriate)      Problem: Pain, Chronic (Adult)  Goal: Identify Related Risk Factors and Signs and Symptoms  Outcome: Ongoing (interventions implemented as appropriate)    Goal: Acceptable Pain/Comfort Level and Functional Ability  Outcome: Ongoing (interventions implemented as appropriate)

## 2019-09-05 NOTE — ED PROVIDER NOTES
EMERGENCY DEPARTMENT ENCOUNTER    CHIEF COMPLAINT  Chief Complaint: fatigue  History given by: patient and patient's family  History limited by: the patient is a poor historian.  Room Number: 18/18  PMD: Reyes, Rosenberg Acosta, MD      HPI:  Pt is a 69 y.o. female who presents complaining of generalized weakness and fatigue for the past two days. The patient also complains of associated nausea, vomiting, diarrhea, right-sided abdominal pain, urinary frequency, and dysuria for the past two days. The patient also complains of posterior right shoulder pain and right-sided neck pain for the past three days. The patient denies that the right shoulder pain is exacerbated with movement. The patient denies fever, cough, cold symptoms, or shortness of breath. The patient's family reports the patient is currently acting at her mental status baseline. The patient reports she has a hx of sepsis s/t frequent urinary tract infections. The patient also reports she has a hx of drop foot on the left s/t a CVA. The patient admits to smoking tobacco. The patient denies EtOH consumption. The patient reports she lives at home with her son. There are no other complaints at this time. The patient's family is bedside.    Duration: two days  Onset: gradual  Timing: constant  Quality: generalized weakness and fatigue  Intensity/Severity: moderate  Progression: not specified  Associated Symptoms: nausea, vomiting, diarrhea, right-sided abdominal pain, and dysuria for the past two days; posterior right shoulder pain and right-sided neck pain for the past three days  Aggravating Factors: none specified  Alleviating Factors: none specified  Previous Episodes: none specified  Treatment before arrival: none specified    PAST MEDICAL HISTORY  Active Ambulatory Problems     Diagnosis Date Noted   • Left lower quadrant pain 04/04/2016   • Right upper quadrant pain 04/04/2016   • Acute cystitis 04/04/2016   • Acute frontal sinusitis 04/04/2016   •  Acute maxillary sinusitis 04/04/2016   • Asthmatic bronchitis 04/04/2016   • Carpal tunnel syndrome 04/04/2016   • Eczema 04/04/2016   • 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/04/2016   • Dysuria 04/04/2016   • Fatigue 04/04/2016   • Fibrocystic breast changes 04/04/2016   • Generalized anxiety disorder 04/04/2016   • Generalized osteoarthritis 04/04/2016   • Hyperlipidemia 04/04/2016   • Hypoglycemia 04/04/2016   • Foot-drop 04/04/2016   • Nonalcoholic fatty liver disease 04/04/2016   • Otitis media 04/04/2016   • Peripheral neuropathy 04/04/2016   • Sciatica 04/04/2016   • Type 2 diabetes mellitus with diabetic polyneuropathy, with long-term current use of insulin (CMS/[facility]) 04/04/2016   • Upper respiratory tract infection 04/04/2016   • Increased frequency of urination 04/04/2016   • Urinary urgency 04/04/2016   • Urinary tract infection 04/04/2016   • Candidiasis of vagina 04/04/2016   • Vertigo 04/04/2016   • Cobalamin deficiency 04/04/2016   • Vitamin D deficiency 04/04/2016   • Adhesive capsulitis of left shoulder 06/03/2016   • Left shoulder pain 06/03/2016   • Health care maintenance 10/21/2016   • Slow transit constipation 10/21/2016   • Bursitis/tendonitis, shoulder 01/12/2017   • Medicare annual wellness visit, subsequent 04/21/2017   • Chronic midline low back pain with left-sided sciatica 06/29/2017   • Controlled substance agreement signed 06/29/2017   • Visit for screening mammogram 04/23/2018   • Hospital discharge follow-up 05/23/2018   • Pyelonephritis 05/23/2018   • Angular cheilitis 05/23/2018   • Recurrent UTI 10/23/2018     Resolved Ambulatory Problems     Diagnosis Date Noted   • No Resolved Ambulatory Problems     Past Medical History:   Diagnosis Date   • Arthritis    • Asthma    • Chronic midline low back pain with left-sided sciatica    • Cystitis    • Diabetic peripheral neuropathy (CMS/[facility])    • Generalized anxiety disorder    • Hyperlipidemia    • Nephrolithiasis    • Urinary tract  infection        PAST SURGICAL HISTORY  Past Surgical History:   Procedure Laterality Date   • BREAST BIOPSY     • LAPAROSCOPIC CHOLECYSTECTOMY W/ CHOLANGIOGRAPHY  11/01/2012   • LIVER BIOPSY  11/01/2012   • PARTIAL HYSTERECTOMY     • UPPER GASTROINTESTINAL ENDOSCOPY  11/26/2012       FAMILY HISTORY  History reviewed. No pertinent family history.    SOCIAL HISTORY  Social History     Socioeconomic History   • Marital status:      Spouse name: Not on file   • Number of children: Not on file   • Years of education: Not on file   • Highest education level: Not on file   Tobacco Use   • Smoking status: Former Smoker   Substance and Sexual Activity   • Alcohol use: No   • Drug use: No       ALLERGIES  Gabapentin; Lyrica [pregabalin]; and Wellbutrin [bupropion]    REVIEW OF SYSTEMS  Review of Systems   Constitutional: Positive for fatigue. Negative for fever.   HENT: Negative for sore throat.    Eyes: Negative.    Respiratory: Negative for cough and shortness of breath.    Cardiovascular: Negative for chest pain.   Gastrointestinal: Positive for abdominal pain (right side), diarrhea, nausea and vomiting.   Genitourinary: Positive for dysuria and frequency.   Musculoskeletal: Positive for arthralgias (right shoulder) and neck pain (right side).   Skin: Negative for rash.   Allergic/Immunologic: Negative.    Neurological: Positive for weakness (generalized). Negative for numbness and headaches.   Hematological: Negative.    Psychiatric/Behavioral: Negative.    All other systems reviewed and are negative.      PHYSICAL EXAM  ED Triage Vitals [09/05/19 1009]   Temp Heart Rate Resp BP SpO2   98.2 °F (36.8 °C) 97 18 153/68 96 %      Temp src Heart Rate Source Patient Position BP Location FiO2 (%)   Tympanic -- Sitting Right arm --       Physical Exam   Constitutional: She is oriented to person, place, and time.   HENT:   Head: Normocephalic and atraumatic.   Mouth/Throat: Oropharynx is clear and moist.   Eyes:  Conjunctivae and EOM are normal. Pupils are equal, round, and reactive to light.   Neck: Normal range of motion. Neck supple. No tracheal deviation present.   Cardiovascular: Normal rate, regular rhythm, normal heart sounds and intact distal pulses. Exam reveals no gallop and no friction rub.   No murmur heard.  Pulmonary/Chest: Effort normal. No stridor. No respiratory distress. She has no decreased breath sounds. She has no wheezes. She has no rhonchi. She has rales (mild) in the right lower field and the left lower field. She exhibits no tenderness.   Oxygen saturation is 93% on RA.   Abdominal: Soft. Bowel sounds are normal. She exhibits no distension and no mass. There is tenderness (of the right mid abdomen). There is CVA tenderness (TTP, Right). There is no rebound and no guarding.   Abdomen is obese.   Musculoskeletal: Normal range of motion. She exhibits no edema.   Neurological: She is alert and oriented to person, place, and time. She has normal sensation and intact cranial nerves. She displays weakness (generalized). No cranial nerve deficit. She shows no pronator drift. GCS score is 15.   She is forgetful. She seems to contradict herself.  Is tired and sleepy appearing and falls asleep easily.  She has a foot drop in the left foot from a previous stroke. She has worsening of chronic weakness in the left leg.  He also complains of weakness to all of her extremities as well.  She has no pronator drift to her upper extremities.  Patient has weakness greater in the left lower extremity especially with plantar flexion and plantar extension.  There is no sensory change.   Skin: Skin is warm and dry. No rash noted.   Psychiatric: Mood and affect normal.   She is tearful.   Nursing note and vitals reviewed.      LAB RESULTS  Lab Results (last 24 hours)     Procedure Component Value Units Date/Time    CBC & Differential [[id number]] Collected:  09/05/19 1031    Specimen:  Blood Updated:  09/05/19 1127     Narrative:       The following orders were created for panel order CBC & Differential.  Procedure                               Abnormality         Status                     ---------                               -----------         ------                     CBC Auto Differential[[id number]]        Abnormal            Final result                 Please view results for these tests on the individual orders.    Comprehensive Metabolic Panel [[id number]]  (Abnormal) Collected:  09/05/19 1031    Specimen:  Blood Updated:  09/05/19 1142     Glucose 240 mg/dL      BUN 10 mg/dL      Creatinine 0.67 mg/dL      Sodium 135 mmol/L      Potassium 4.0 mmol/L      Chloride 95 mmol/L      CO2 26.7 mmol/L      Calcium 9.1 mg/dL      Total Protein 7.1 g/dL      Albumin 4.30 g/dL      ALT (SGPT) 47 U/L      AST (SGOT) 27 U/L      Alkaline Phosphatase 150 U/L      Total Bilirubin 0.3 mg/dL      eGFR Non African Amer 88 mL/min/1.73      Globulin 2.8 gm/dL      A/G Ratio 1.5 g/dL      BUN/Creatinine Ratio 14.9     Anion Gap 13.3 mmol/L     Narrative:       GFR Normal >60  Chronic Kidney Disease <60  Kidney Failure <15    Protime-INR [[id number]]  (Normal) Collected:  09/05/19 1031    Specimen:  Blood Updated:  09/05/19 1129     Protime 13.2 Seconds      INR 1.03    Procalcitonin [[id number]]  (Abnormal) Collected:  09/05/19 1031    Specimen:  Blood Updated:  09/05/19 1157     Procalcitonin <0.02 ng/mL     Narrative:       As a Marker for Sepsis (Non-Neonates):   1. <0.5 ng/mL represents a low risk of severe sepsis and/or septic shock.  1. >2 ng/mL represents a high risk of severe sepsis and/or septic shock.    As a Marker for Lower Respiratory Tract Infections that require antibiotic therapy:  PCT on Admission     Antibiotic Therapy             6-12 Hrs later  > 0.5                Strongly Recommended            >0.25 - <0.5         Recommended  0.1 - 0.25           Discouraged                   Remeasure/reassess PCT  <0.1               "   Strongly Discouraged          Remeasure/reassess PCT      As 28 day mortality risk marker: \"Change in Procalcitonin Result\" (> 80 % or <=80 %) if Day 0 (or Day 1) and Day 4 values are available. Refer to http://[email or web address]-pct-calculator.com/   Change in PCT <=80 %   A decrease of PCT levels below or equal to 80 % defines a positive change in PCT test result representing a higher risk for 28-day all-cause mortality of patients diagnosed with severe sepsis or septic shock.  Change in PCT > 80 %   A decrease of PCT levels of more than 80 % defines a negative change in PCT result representing a lower risk for 28-day all-cause mortality of patients diagnosed with severe sepsis or septic shock.                Magnesium [[id number]]  (Normal) Collected:  09/05/19 1031    Specimen:  Blood Updated:  09/05/19 1142     Magnesium 1.6 mg/dL     Troponin [[id number]]  (Normal) Collected:  09/05/19 1031    Specimen:  Blood Updated:  09/05/19 1147     Troponin T <0.010 ng/mL     Narrative:       Troponin T Reference Range:  <= 0.03 ng/mL-   Negative for AMI  >0.03 ng/mL-     Abnormal for myocardial necrosis.  Clinicians would have to utilize clinical acumen, EKG, Troponin and serial changes to determine if it is an Acute Myocardial Infarction or myocardial injury due to an underlying chronic condition.     Ethanol [[id number]] Collected:  09/05/19 1031    Specimen:  Blood Updated:  09/05/19 1142     Ethanol <10 mg/dL      Ethanol % <0.010 %     CBC Auto Differential [[id number]]  (Abnormal) Collected:  09/05/19 1031    Specimen:  Blood Updated:  09/05/19 1125     WBC 6.77 10*3/mm3      RBC 4.67 10*6/mm3      Hemoglobin 13.8 g/dL      Hematocrit 42.2 %      MCV 90.4 fL      MCH 29.6 pg      MCHC 32.7 g/dL      RDW 13.3 %      RDW-SD 43.9 fl      MPV 10.1 fL      Platelets 242 10*3/mm3      Neutrophil % 86.5 %      Lymphocyte % 6.8 %      Monocyte % 5.9 %      Eosinophil % 0.0 %      Basophil % 0.4 %      Immature Grans % 0.4 %      " Neutrophils, Absolute 5.85 10*3/mm3      Lymphocytes, Absolute 0.46 10*3/mm3      Monocytes, Absolute 0.40 10*3/mm3      Eosinophils, Absolute 0.00 10*3/mm3      Basophils, Absolute 0.03 10*3/mm3      Immature Grans, Absolute 0.03 10*3/mm3      nRBC 0.0 /100 WBC     Urinalysis With Microscopic If Indicated (No Culture) - Urine, Clean Catch [[id number]]  (Abnormal) Collected:  09/05/19 1123    Specimen:  Urine, Clean Catch Updated:  09/05/19 1148     Color, UA Yellow     Appearance, UA Cloudy     pH, UA 5.5     Specific Gravity, UA 1.009     Glucose,  mg/dL (1+)     Ketones, UA Negative     Bilirubin, UA Negative     Blood, UA Trace     Protein, UA Negative     Leuk Esterase, UA Large (3+)     Nitrite, UA Negative     Urobilinogen, UA 0.2 E.U./dL    Urine Drug Screen - Urine, Clean Catch [[id number]]  (Abnormal) Collected:  09/05/19 1123    Specimen:  Urine, Clean Catch Updated:  09/05/19 1157     Amphet/Methamphet, Screen Negative     Barbiturates Screen, Urine Negative     Benzodiazepine Screen, Urine Positive     Cocaine Screen, Urine Negative     Opiate Screen Negative     THC, Screen, Urine Negative     Methadone Screen, Urine Negative     Oxycodone Screen, Urine Positive    Narrative:       Negative Thresholds For Drugs Screened:     Amphetamines               500 ng/ml   Barbiturates               200 ng/ml   Benzodiazepines            100 ng/ml   Cocaine                    300 ng/ml   Methadone                  300 ng/ml   Opiates                    300 ng/ml   Oxycodone                  100 ng/ml   THC                        50 ng/ml    The Normal Value for all drugs tested is negative. This report includes final unconfirmed screening results to be used for medical treatment purposes only. Unconfirmed results must not be used for non-medical purposes such as employment or legal testing. Clinical consideration should be applied to any drug of abuse test, particulary when unconfirmed results are used.     Urinalysis, Microscopic Only - Urine, Clean Catch [[id number]]  (Abnormal) Collected:  09/05/19 1123    Specimen:  Urine, Clean Catch Updated:  09/05/19 1148     RBC, UA 0-2 /HPF      WBC, UA Too Numerous to Count /HPF      Bacteria, UA 4+ /HPF      Squamous Epithelial Cells, UA 0-2 /HPF      Hyaline Casts, UA 0-2 /LPF      Methodology Automated Microscopy    Urine Culture - Urine, Urine, Clean Catch [[id number]] Collected:  09/05/19 1123    Specimen:  Urine, Clean Catch Updated:  09/05/19 1305    Lactic Acid, Plasma [[id number]]  (Normal) Collected:  09/05/19 1130    Specimen:  Blood Updated:  09/05/19 1155     Lactate 1.8 mmol/L     Blood Culture - Blood, Arm, Left [[id number]] Collected:  09/05/19 1130    Specimen:  Blood from Arm, Left Updated:  09/05/19 1137    Blood Culture - Blood, Arm, Left [[id number]] Collected:  09/05/19 1133    Specimen:  Blood from Arm, Left Updated:  09/05/19 1138    POC Glucose Once [[id number]]  (Abnormal) Collected:  09/05/19 1140    Specimen:  Blood Updated:  09/05/19 1146     Glucose 173 mg/dL           I ordered the above labs and reviewed the results    RADIOLOGY  CT Head Without Contrast   Final Result   No evidence of acute infarction or hemorrhage. Vascular   calcifications involving the carotid siphons are noted. Further   evaluation could be performed with MRI examination of the brain as   indicated.       The above information was called to and discussed with Dr. Tay.           Radiation dose reduction techniques were utilized, including automated   exposure control and exposure modulation based on body size.       This report was finalized on 9/5/2019 2:41 PM by Dr. Sree Buck M.D.          CT Abdomen Pelvis With Contrast   Final Result      XR Chest 1 View   Final Result   FINDINGS AND IMPRESSION:   Mild asymmetric pulmonary opacification within the left lower lobe is   grossly unchanged since 06/17/2018. There is no new pulmonary   consolidation. No pneumothorax or  pleural effusion is seen. Heart is   normal in size.       There is no fracture or dislocation involving the right shoulder.       This report was finalized on 9/5/2019 12:31 PM by Dr. Mansoor Rizo M.D.          XR Shoulder 2+ View Right   Final Result   FINDINGS AND IMPRESSION:   Mild asymmetric pulmonary opacification within the left lower lobe is   grossly unchanged since 06/17/2018. There is no new pulmonary   consolidation. No pneumothorax or pleural effusion is seen. Heart is   normal in size.       There is no fracture or dislocation involving the right shoulder.       This report was finalized on 9/5/2019 12:31 PM by Dr. Mansoor Rizo M.D.               I ordered the above noted radiological studies. Interpreted by radiologist. Discussed with radiologist (Dr. Dickens). Reviewed by me in PACS.       PROCEDURES  Procedures    EKG          EKG time: 1020  Rhythm/Rate: SR, 77  P waves and WA: nml; nml  QRS, axis: narrow complex; nml axis   ST and T waves: diffuse nonspecific ST/T wave findings, nml QT  Movement artifact    Interpreted Contemporaneously by me, independently viewed  Unchanged compared to prior from 06/16/2019.      PROGRESS AND CONSULTS  1029 EKG was ordered for further evaluation prior to my arrival to the ED.    1116 Ordered IV Fluids for hydration. Also ordered CXR, CT Head, CT Abd/Pelvis, R Shoulder XR, UA, blood cultures, and blood work for further evaluation.    1216 Rechecked the patient who is resting comfortably and in NAD. Patient is stable. BP- 153/68 HR- 64 Temp- 98.2 °F (36.8 °C) (Tympanic) O2 sat- 95%. Informed the patient of her UA which shows findings consistent with an acute urinary tract infection. Notified the patient that I am still awaiting her other test results. Pt understands and agrees with the plan, all questions answered.    1217 Ordered urine culture for further evaluation.    1306 Received a call from Dr. Pk Dickens (Radiology) and discussed pt's case. He  stated the CT Abd/Pelvis shows chronic changes but nothing acute.    1329 Rechecked the patient who is resting comfortably and in NAD. Patient is stable. Her blood pressure is mildly elevated. BP- 155/75 HR- 76 Temp- 98.2 °F (36.8 °C) (Tympanic) O2 sat- 95%. The patient is not in acute opiate or benzodiazepine withdrawal. I believe her cause of metabolic enceopathis secondary to her UTI. Informed the patient of her test results. Discussed the plan for admission for further evaluation and management. Pt understands and agrees with the plan, all questions answered.    1330 Placed call to Family Medicine for admission.    1519 Received a call from Dr. Reyes ([facility]) and discussed pt's case. Dr. Reyes agreed with plan to admit the patient to tele obs for further evaluation and management.        MEDICAL DECISION MAKING  Results were reviewed/discussed with the patient and they were also made aware of online access. Pt also made aware that some labs, such as cultures, will not be resulted during ER visit and follow up with PMD is necessary.     MDM  Number of Diagnoses or Management Options  Acute UTI:   Chronic prescription benzodiazepine use:   Metabolic encephalopathy:   Opiate use:      Amount and/or Complexity of Data Reviewed  Clinical lab tests: ordered and reviewed (UA WBC: TNTC and UA Bacteria: 4+. UDS is positive for Oxycodone and Benzodiazepine. WBC is 6.77. Procalcitonin is negative.)  Tests in the radiology section of CPT®: ordered and reviewed (CT Head, CXR, and R Shoulder XR show nothing acute. CT Abd/Pelvis shows nothing acute as well.)  Tests in the medicine section of CPT®: reviewed and ordered (See procedure notes for EKG interpretation.)  Discussion of test results with the performing providers: yes (Dr. Buck (Radiology) and Dr. Pk Dickens (Radiology))  Decide to obtain previous medical records or to obtain history from someone other than the patient: yes (Epic)  Obtain history from  someone other than the patient: yes (Patient's family)  Review and summarize past medical records: yes (She was seen by Dr. Gurjit Nagy at [facility] ED for similar symptoms. She had a CT Abd/Pelvis done. She was discharged home. Her workup was unremarkable.)  Discuss the patient with other providers: yes (Dr. Reyes ([facility]))  Independent visualization of images, tracings, or specimens: yes    Patient Progress  Patient progress: stable         DIAGNOSIS  Final diagnoses:   Acute UTI   Metabolic encephalopathy   Opiate use   Chronic prescription benzodiazepine use       DISPOSITION  ADMISSION TO TELE OBS    Discussed treatment plan and reason for admission with pt/family and admitting physician.  Pt/family voiced understanding of the plan for admission for further testing/treatment as needed.    Latest Documented Vital Signs:  As of 3:18 PM  BP- 155/75 HR- 64 Temp- 98.2 °F (36.8 °C) (Tympanic) O2 sat- 95%    --  Documentation assistance provided by ada Willson for Dr. Vikram Tay MD.  Information recorded by the scribe was done at my direction and has been verified and validated by me.       Lizy Willson  09/05/19 6553       Vikram Tay MD  09/05/19 6190

## 2019-09-05 NOTE — PROGRESS NOTES
Clinical Pharmacy Services: Medication History    Marlyn Rosario is a 69 y.o. female presenting to  for   Chief Complaint   Patient presents with   • Shoulder Pain   • Fatigue       She  has a past medical history of Arthritis, Asthma, Chronic midline low back pain with left-sided sciatica, Cystitis, Diabetic peripheral neuropathy (CMS/HCC), Generalized anxiety disorder, Hyperlipidemia, Nephrolithiasis, and Urinary tract infection.    Allergies as of 09/05/2019 - Reviewed 09/05/2019   Allergen Reaction Noted   • Gabapentin  12/23/2015   • Lyrica [pregabalin]  12/23/2015   • Wellbutrin [bupropion]  04/04/2016       Medication information was obtained from: Pharmacy  Pharmacy and Phone Number: [facility] [phone]    Prior to Admission Medications     Prescriptions Last Dose Informant Patient Reported? Taking?    ALPRAZolam (XANAX) 1 MG tablet  Pharmacy Yes Yes    Take 1 mg by mouth Every 8 (Eight) Hours As Needed for Anxiety.    amLODIPine (NORVASC) 5 MG tablet  Pharmacy Yes Yes    Take 5 mg by mouth Daily.    escitalopram (LEXAPRO) 10 MG tablet  Pharmacy Yes Yes    Take 10 mg by mouth Daily.    esomeprazole (nexIUM) 40 MG capsule  Pharmacy Yes Yes    Take 40 mg by mouth Daily.    losartan (COZAAR) 50 MG tablet  Pharmacy Yes Yes    Take 50 mg by mouth Daily.    metFORMIN (GLUCOPHAGE) 1000 MG tablet  Pharmacy Yes Yes    Take 1,000 mg by mouth 2 (Two) Times a Day With Meals.    oxyCODONE (ROXICODONE) 15 MG immediate release tablet  Pharmacy Yes Yes    Take 15 mg by mouth 4 (Four) Times a Day.    pioglitazone (ACTOS) 30 MG tablet  Pharmacy Yes Yes    Take 30 mg by mouth Daily.    vitamin E 1000 UNIT capsule  Pharmacy Yes Yes    Take 1,000 Units by mouth Daily.    insulin NPH-insulin regular (humuLIN 70/30,novoLIN 70/30) (70-30) 100 UNIT/ML injection  Pharmacy Yes No    Inject  under the skin 2 (Two) Times a Day With Meals.    insulin regular (humuLIN R,novoLIN R) 100 UNIT/ML injection   Pharmacy Yes No    Inject  under the skin into the appropriate area as directed 3 (Three) Times a Day Before Meals.            Medication notes: Removed: Flonase, Omeprazole, Vitamin D, Macrobid, Lisinopril, and Lipitor    Added: Norvasc, Losartan, Vitamin E, Lexapro, Nexium, and Actos    Per pharmacy records. Was not able to verify insulin, [facility] pharmacy has no record of any insulin filled with their pharmacy    This medication list is complete to the best of my knowledge as of 9/5/2019    Please call if questions.    Marlyn Oropeza, Medication History Technician  9/5/2019 3:56 PM

## 2019-09-06 ENCOUNTER — APPOINTMENT (OUTPATIENT)
Dept: MRI IMAGING | Facility: HOSPITAL | Age: 69
End: 2019-09-06

## 2019-09-06 ENCOUNTER — APPOINTMENT (OUTPATIENT)
Dept: CARDIOLOGY | Facility: HOSPITAL | Age: 69
End: 2019-09-06

## 2019-09-06 PROBLEM — R31.9 HEMATURIA: Status: ACTIVE | Noted: 2019-09-06

## 2019-09-06 PROBLEM — K76.0 FATTY LIVER: Status: ACTIVE | Noted: 2019-09-06

## 2019-09-06 PROBLEM — R81 GLUCOSURIA: Status: ACTIVE | Noted: 2019-09-06

## 2019-09-06 PROBLEM — E87.1 HYPONATREMIA: Status: ACTIVE | Noted: 2019-09-06

## 2019-09-06 PROBLEM — R73.9 HYPERGLYCEMIA: Status: ACTIVE | Noted: 2019-09-06

## 2019-09-06 PROBLEM — I65.29 CAROTID ARTERY STENOSIS: Status: ACTIVE | Noted: 2019-09-06

## 2019-09-06 PROBLEM — G93.41 METABOLIC ENCEPHALOPATHY: Status: ACTIVE | Noted: 2019-09-06

## 2019-09-06 LAB
ALBUMIN SERPL-MCNC: 4.1 G/DL (ref 3.5–5.2)
ALBUMIN/GLOB SERPL: 1.6 G/DL
ALP SERPL-CCNC: 202 U/L (ref 39–117)
ALT SERPL W P-5'-P-CCNC: 98 U/L (ref 1–33)
AMYLASE SERPL-CCNC: 44 U/L (ref 28–100)
ANION GAP SERPL CALCULATED.3IONS-SCNC: 11.5 MMOL/L (ref 5–15)
AORTIC DIMENSIONLESS INDEX: 0.6 (DI)
ARTERIAL PATENCY WRIST A: ABNORMAL
ARTICHOKE IGE QN: 177 MG/DL (ref 0–100)
AST SERPL-CCNC: 137 U/L (ref 1–32)
ATMOSPHERIC PRESS: 747 MMHG
BASE EXCESS BLDA CALC-SCNC: 2.6 MMOL/L (ref 0–2)
BASOPHILS # BLD AUTO: 0.03 10*3/MM3 (ref 0–0.2)
BASOPHILS NFR BLD AUTO: 0.6 % (ref 0–1.5)
BDY SITE: ABNORMAL
BH CV ECHO MEAS - ACS: 1.8 CM
BH CV ECHO MEAS - AO MAX PG: 4.3 MMHG
BH CV ECHO MEAS - AO MEAN PG (FULL): 5 MMHG
BH CV ECHO MEAS - AO MEAN PG: 7 MMHG
BH CV ECHO MEAS - AO ROOT AREA (BSA CORRECTED): 1.4
BH CV ECHO MEAS - AO ROOT AREA: 4.9 CM^2
BH CV ECHO MEAS - AO ROOT DIAM: 2.5 CM
BH CV ECHO MEAS - AO V2 MAX: 103.6 CM/SEC
BH CV ECHO MEAS - AO V2 MEAN: 122 CM/SEC
BH CV ECHO MEAS - AO V2 VTI: 38.8 CM
BH CV ECHO MEAS - ASC AORTA: 2.7 CM
BH CV ECHO MEAS - AVA(I,A): 1.6 CM^2
BH CV ECHO MEAS - AVA(I,D): 1.6 CM^2
BH CV ECHO MEAS - BSA(HAYCOCK): 1.9 M^2
BH CV ECHO MEAS - BSA: 1.8 M^2
BH CV ECHO MEAS - BZI_BMI: 27.1 KILOGRAMS/M^2
BH CV ECHO MEAS - BZI_METRIC_HEIGHT: 165.1 CM
BH CV ECHO MEAS - BZI_METRIC_WEIGHT: 73.9 KG
BH CV ECHO MEAS - EDV(CUBED): 132.7 ML
BH CV ECHO MEAS - EDV(MOD-SP2): 66 ML
BH CV ECHO MEAS - EDV(MOD-SP4): 66 ML
BH CV ECHO MEAS - EDV(TEICH): 123.8 ML
BH CV ECHO MEAS - EF(CUBED): 79.6 %
BH CV ECHO MEAS - EF(MOD-BP): 61 %
BH CV ECHO MEAS - EF(MOD-SP2): 59.1 %
BH CV ECHO MEAS - EF(MOD-SP4): 60.6 %
BH CV ECHO MEAS - EF(TEICH): 71.7 %
BH CV ECHO MEAS - ESV(CUBED): 27 ML
BH CV ECHO MEAS - ESV(MOD-SP2): 27 ML
BH CV ECHO MEAS - ESV(MOD-SP4): 26 ML
BH CV ECHO MEAS - ESV(TEICH): 35 ML
BH CV ECHO MEAS - FS: 41.2 %
BH CV ECHO MEAS - IVS/LVPW: 0.9
BH CV ECHO MEAS - IVSD: 0.9 CM
BH CV ECHO MEAS - LAT PEAK E' VEL: 9.7 CM/SEC
BH CV ECHO MEAS - LV DIASTOLIC VOL/BSA (35-75): 36.4 ML/M^2
BH CV ECHO MEAS - LV MASS(C)D: 175.6 GRAMS
BH CV ECHO MEAS - LV MASS(C)DI: 96.8 GRAMS/M^2
BH CV ECHO MEAS - LV MAX PG: 4.3 MMHG
BH CV ECHO MEAS - LV MEAN PG: 2 MMHG
BH CV ECHO MEAS - LV SYSTOLIC VOL/BSA (12-30): 14.3 ML/M^2
BH CV ECHO MEAS - LV V1 MAX: 103.6 CM/SEC
BH CV ECHO MEAS - LV V1 MEAN: 72.2 CM/SEC
BH CV ECHO MEAS - LV V1 VTI: 22.2 CM
BH CV ECHO MEAS - LVIDD: 5.1 CM
BH CV ECHO MEAS - LVIDS: 3 CM
BH CV ECHO MEAS - LVLD AP2: 6.9 CM
BH CV ECHO MEAS - LVLD AP4: 7 CM
BH CV ECHO MEAS - LVLS AP2: 5.9 CM
BH CV ECHO MEAS - LVLS AP4: 5.9 CM
BH CV ECHO MEAS - LVOT AREA (M): 2.8 CM^2
BH CV ECHO MEAS - LVOT AREA: 2.8 CM^2
BH CV ECHO MEAS - LVOT DIAM: 1.9 CM
BH CV ECHO MEAS - LVPWD: 1 CM
BH CV ECHO MEAS - MED PEAK E' VEL: 9.07 CM/SEC
BH CV ECHO MEAS - MR MAX PG: 108.6 MMHG
BH CV ECHO MEAS - MR MAX VEL: 521 CM/SEC
BH CV ECHO MEAS - MV A DUR: 0.14 SEC
BH CV ECHO MEAS - MV A MAX VEL: 109 CM/SEC
BH CV ECHO MEAS - MV DEC SLOPE: 449 CM/SEC^2
BH CV ECHO MEAS - MV DEC TIME: 182 SEC
BH CV ECHO MEAS - MV E MAX VEL: 112 CM/SEC
BH CV ECHO MEAS - MV E/A: 1
BH CV ECHO MEAS - MV MEAN PG: 3 MMHG
BH CV ECHO MEAS - MV P1/2T MAX VEL: 144 CM/SEC
BH CV ECHO MEAS - MV P1/2T: 93.9 MSEC
BH CV ECHO MEAS - MV V2 MEAN: 81.8 CM/SEC
BH CV ECHO MEAS - MV V2 VTI: 39.7 CM
BH CV ECHO MEAS - MVA P1/2T LCG: 1.5 CM^2
BH CV ECHO MEAS - MVA(P1/2T): 2.3 CM^2
BH CV ECHO MEAS - MVA(VTI): 1.6 CM^2
BH CV ECHO MEAS - PA ACC SLOPE: 2531 CM/SEC^2
BH CV ECHO MEAS - PA ACC TIME: 0.05 SEC
BH CV ECHO MEAS - PA MAX PG (FULL): 1.9 MMHG
BH CV ECHO MEAS - PA MAX PG: 7.5 MMHG
BH CV ECHO MEAS - PA PR(ACCEL): 56.5 MMHG
BH CV ECHO MEAS - PA V2 MAX: 137 CM/SEC
BH CV ECHO MEAS - PULM A REVS DUR: 0.13 SEC
BH CV ECHO MEAS - PULM A REVS VEL: 34.6 CM/SEC
BH CV ECHO MEAS - PULM DIAS VEL: 36.8 CM/SEC
BH CV ECHO MEAS - PULM S/D: 1.9
BH CV ECHO MEAS - PULM SYS VEL: 71 CM/SEC
BH CV ECHO MEAS - RAP SYSTOLE: 3 MMHG
BH CV ECHO MEAS - RV MAX PG: 5.6 MMHG
BH CV ECHO MEAS - RV MEAN PG: 4 MMHG
BH CV ECHO MEAS - RV V1 MAX: 118 CM/SEC
BH CV ECHO MEAS - RV V1 MEAN: 89 CM/SEC
BH CV ECHO MEAS - RV V1 VTI: 29.6 CM
BH CV ECHO MEAS - RVSP: 17 MMHG
BH CV ECHO MEAS - SI(AO): 105 ML/M^2
BH CV ECHO MEAS - SI(CUBED): 58.3 ML/M^2
BH CV ECHO MEAS - SI(LVOT): 34.7 ML/M^2
BH CV ECHO MEAS - SI(MOD-SP2): 21.5 ML/M^2
BH CV ECHO MEAS - SI(MOD-SP4): 22.1 ML/M^2
BH CV ECHO MEAS - SI(TEICH): 49 ML/M^2
BH CV ECHO MEAS - SV(AO): 190.5 ML
BH CV ECHO MEAS - SV(CUBED): 105.7 ML
BH CV ECHO MEAS - SV(LVOT): 62.9 ML
BH CV ECHO MEAS - SV(MOD-SP2): 39 ML
BH CV ECHO MEAS - SV(MOD-SP4): 40 ML
BH CV ECHO MEAS - SV(TEICH): 88.8 ML
BH CV ECHO MEAS - TAPSE (>1.6): 2.3 CM2
BH CV ECHO MEAS - TR MAX PG: 14
BH CV ECHO MEAS - TR MAX VEL: 188 CM/SEC
BH CV ECHO MEASUREMENTS AVERAGE E/E' RATIO: 11.93
BH CV LOWER VASCULAR LEFT COMMON FEMORAL AUGMENT: NORMAL
BH CV LOWER VASCULAR LEFT COMMON FEMORAL COMPETENT: NORMAL
BH CV LOWER VASCULAR LEFT COMMON FEMORAL COMPRESS: NORMAL
BH CV LOWER VASCULAR LEFT COMMON FEMORAL PHASIC: NORMAL
BH CV LOWER VASCULAR LEFT COMMON FEMORAL SPONT: NORMAL
BH CV LOWER VASCULAR LEFT DISTAL FEMORAL COMPRESS: NORMAL
BH CV LOWER VASCULAR LEFT GASTRONEMIUS COMPRESS: NORMAL
BH CV LOWER VASCULAR LEFT GREATER SAPH AK COMPRESS: NORMAL
BH CV LOWER VASCULAR LEFT GREATER SAPH BK COMPRESS: NORMAL
BH CV LOWER VASCULAR LEFT MID FEMORAL AUGMENT: NORMAL
BH CV LOWER VASCULAR LEFT MID FEMORAL COMPETENT: NORMAL
BH CV LOWER VASCULAR LEFT MID FEMORAL COMPRESS: NORMAL
BH CV LOWER VASCULAR LEFT MID FEMORAL PHASIC: NORMAL
BH CV LOWER VASCULAR LEFT MID FEMORAL SPONT: NORMAL
BH CV LOWER VASCULAR LEFT PERONEAL COMPRESS: NORMAL
BH CV LOWER VASCULAR LEFT POPLITEAL AUGMENT: NORMAL
BH CV LOWER VASCULAR LEFT POPLITEAL COMPETENT: NORMAL
BH CV LOWER VASCULAR LEFT POPLITEAL COMPRESS: NORMAL
BH CV LOWER VASCULAR LEFT POPLITEAL PHASIC: NORMAL
BH CV LOWER VASCULAR LEFT POPLITEAL SPONT: NORMAL
BH CV LOWER VASCULAR LEFT POSTERIOR TIBIAL COMPRESS: NORMAL
BH CV LOWER VASCULAR LEFT PROXIMAL FEMORAL COMPRESS: NORMAL
BH CV LOWER VASCULAR LEFT SAPHENOFEMORAL JUNCTION COMPRESS: NORMAL
BH CV LOWER VASCULAR RIGHT COMMON FEMORAL AUGMENT: NORMAL
BH CV LOWER VASCULAR RIGHT COMMON FEMORAL COMPETENT: NORMAL
BH CV LOWER VASCULAR RIGHT COMMON FEMORAL COMPRESS: NORMAL
BH CV LOWER VASCULAR RIGHT COMMON FEMORAL PHASIC: NORMAL
BH CV LOWER VASCULAR RIGHT COMMON FEMORAL SPONT: NORMAL
BH CV LOWER VASCULAR RIGHT DISTAL FEMORAL COMPRESS: NORMAL
BH CV LOWER VASCULAR RIGHT GASTRONEMIUS COMPRESS: NORMAL
BH CV LOWER VASCULAR RIGHT GREATER SAPH AK COMPRESS: NORMAL
BH CV LOWER VASCULAR RIGHT GREATER SAPH BK COMPRESS: NORMAL
BH CV LOWER VASCULAR RIGHT MID FEMORAL AUGMENT: NORMAL
BH CV LOWER VASCULAR RIGHT MID FEMORAL COMPETENT: NORMAL
BH CV LOWER VASCULAR RIGHT MID FEMORAL COMPRESS: NORMAL
BH CV LOWER VASCULAR RIGHT MID FEMORAL PHASIC: NORMAL
BH CV LOWER VASCULAR RIGHT MID FEMORAL SPONT: NORMAL
BH CV LOWER VASCULAR RIGHT PERONEAL COMPRESS: NORMAL
BH CV LOWER VASCULAR RIGHT POPLITEAL AUGMENT: NORMAL
BH CV LOWER VASCULAR RIGHT POPLITEAL COMPETENT: NORMAL
BH CV LOWER VASCULAR RIGHT POPLITEAL COMPRESS: NORMAL
BH CV LOWER VASCULAR RIGHT POPLITEAL PHASIC: NORMAL
BH CV LOWER VASCULAR RIGHT POPLITEAL SPONT: NORMAL
BH CV LOWER VASCULAR RIGHT POSTERIOR TIBIAL COMPRESS: NORMAL
BH CV LOWER VASCULAR RIGHT PROXIMAL FEMORAL COMPRESS: NORMAL
BH CV LOWER VASCULAR RIGHT SAPHENOFEMORAL JUNCTION AUGMENT: NORMAL
BH CV LOWER VASCULAR RIGHT SAPHENOFEMORAL JUNCTION COMPETENT: NORMAL
BH CV LOWER VASCULAR RIGHT SAPHENOFEMORAL JUNCTION COMPRESS: NORMAL
BH CV LOWER VASCULAR RIGHT SAPHENOFEMORAL JUNCTION PHASIC: NORMAL
BH CV LOWER VASCULAR RIGHT SAPHENOFEMORAL JUNCTION SPONT: NORMAL
BH CV XLRA - RV BASE: 3.8 CM
BH CV XLRA - TDI S': 11.6 CM/SEC
BH CV XLRA MEAS LEFT CCA RATIO VEL: 95 CM/SEC
BH CV XLRA MEAS LEFT DIST CCA EDV: 14.7 CM/SEC
BH CV XLRA MEAS LEFT DIST CCA PSV: 95 CM/SEC
BH CV XLRA MEAS LEFT DIST ICA EDV: -20.9 CM/SEC
BH CV XLRA MEAS LEFT DIST ICA PSV: -95.6 CM/SEC
BH CV XLRA MEAS LEFT ICA RATIO VEL: 138 CM/SEC
BH CV XLRA MEAS LEFT ICA/CCA RATIO: 1.5
BH CV XLRA MEAS LEFT MID ICA EDV: 31.3 CM/SEC
BH CV XLRA MEAS LEFT MID ICA PSV: 138 CM/SEC
BH CV XLRA MEAS LEFT PROX CCA EDV: 13.4 CM/SEC
BH CV XLRA MEAS LEFT PROX CCA PSV: 101 CM/SEC
BH CV XLRA MEAS LEFT PROX ECA EDV: -9.4 CM/SEC
BH CV XLRA MEAS LEFT PROX ECA PSV: -119 CM/SEC
BH CV XLRA MEAS LEFT PROX ICA EDV: -21.1 CM/SEC
BH CV XLRA MEAS LEFT PROX ICA PSV: -72.7 CM/SEC
BH CV XLRA MEAS LEFT PROX SCLA PSV: 118 CM/SEC
BH CV XLRA MEAS LEFT VERTEBRAL A EDV: 14.5 CM/SEC
BH CV XLRA MEAS LEFT VERTEBRAL A PSV: 63.3 CM/SEC
BH CV XLRA MEAS RIGHT CCA RATIO VEL: 80.9 CM/SEC
BH CV XLRA MEAS RIGHT DIST CCA EDV: 12.9 CM/SEC
BH CV XLRA MEAS RIGHT DIST CCA PSV: 80.9 CM/SEC
BH CV XLRA MEAS RIGHT DIST ICA EDV: -24.6 CM/SEC
BH CV XLRA MEAS RIGHT DIST ICA PSV: -82.7 CM/SEC
BH CV XLRA MEAS RIGHT ICA RATIO VEL: 111 CM/SEC
BH CV XLRA MEAS RIGHT ICA/CCA RATIO: 1.4
BH CV XLRA MEAS RIGHT MID ICA EDV: 28.1 CM/SEC
BH CV XLRA MEAS RIGHT MID ICA PSV: 111 CM/SEC
BH CV XLRA MEAS RIGHT PROX CCA EDV: 11 CM/SEC
BH CV XLRA MEAS RIGHT PROX CCA PSV: 89.6 CM/SEC
BH CV XLRA MEAS RIGHT PROX ECA PSV: -104 CM/SEC
BH CV XLRA MEAS RIGHT PROX ICA EDV: -17 CM/SEC
BH CV XLRA MEAS RIGHT PROX ICA PSV: -92.6 CM/SEC
BH CV XLRA MEAS RIGHT PROX SCLA PSV: 152 CM/SEC
BH CV XLRA MEAS RIGHT VERTEBRAL A EDV: 15.8 CM/SEC
BH CV XLRA MEAS RIGHT VERTEBRAL A PSV: 75.6 CM/SEC
BILIRUB SERPL-MCNC: 0.4 MG/DL (ref 0.2–1.2)
BUN BLD-MCNC: 7 MG/DL (ref 8–23)
BUN/CREAT SERPL: 12.7 (ref 7–25)
CALCIUM SPEC-SCNC: 8.6 MG/DL (ref 8.6–10.5)
CHLORIDE SERPL-SCNC: 94 MMOL/L (ref 98–107)
CHOLEST SERPL-MCNC: 244 MG/DL (ref 0–200)
CO2 SERPL-SCNC: 25.5 MMOL/L (ref 22–29)
CREAT BLD-MCNC: 0.55 MG/DL (ref 0.57–1)
DEPRECATED RDW RBC AUTO: 42.4 FL (ref 37–54)
EOSINOPHIL # BLD AUTO: 0 10*3/MM3 (ref 0–0.4)
EOSINOPHIL NFR BLD AUTO: 0 % (ref 0.3–6.2)
ERYTHROCYTE [DISTWIDTH] IN BLOOD BY AUTOMATED COUNT: 13.3 % (ref 12.3–15.4)
GFR SERPL CREATININE-BSD FRML MDRD: 110 ML/MIN/1.73
GLOBULIN UR ELPH-MCNC: 2.5 GM/DL
GLUCOSE BLD-MCNC: 168 MG/DL (ref 65–99)
GLUCOSE BLDC GLUCOMTR-MCNC: 113 MG/DL (ref 70–130)
GLUCOSE BLDC GLUCOMTR-MCNC: 213 MG/DL (ref 70–130)
GLUCOSE BLDC GLUCOMTR-MCNC: 228 MG/DL (ref 70–130)
GLUCOSE BLDC GLUCOMTR-MCNC: 246 MG/DL (ref 70–130)
GLUCOSE BLDC GLUCOMTR-MCNC: 80 MG/DL (ref 70–130)
HBA1C MFR BLD: 12 % (ref 4.8–5.6)
HCO3 BLDA-SCNC: 28.1 MMOL/L (ref 22–28)
HCT VFR BLD AUTO: 37.4 % (ref 34–46.6)
HDLC SERPL-MCNC: 32 MG/DL (ref 40–60)
HGB BLD-MCNC: 12.5 G/DL (ref 12–15.9)
IMM GRANULOCYTES # BLD AUTO: 0.02 10*3/MM3 (ref 0–0.05)
IMM GRANULOCYTES NFR BLD AUTO: 0.4 % (ref 0–0.5)
LDLC SERPL CALC-MCNC: ABNORMAL MG/DL
LDLC/HDLC SERPL: ABNORMAL {RATIO}
LEFT ARM BP: NORMAL MMHG
LEFT ATRIUM VOLUME INDEX: 33.1 ML/M2
LIPASE SERPL-CCNC: 92 U/L (ref 13–60)
LYMPHOCYTES # BLD AUTO: 0.7 10*3/MM3 (ref 0.7–3.1)
LYMPHOCYTES NFR BLD AUTO: 14 % (ref 19.6–45.3)
MAGNESIUM SERPL-MCNC: 1.9 MG/DL (ref 1.6–2.4)
MAXIMAL PREDICTED HEART RATE: 153 BPM
MCH RBC QN AUTO: 29.2 PG (ref 26.6–33)
MCHC RBC AUTO-ENTMCNC: 33.4 G/DL (ref 31.5–35.7)
MCV RBC AUTO: 87.4 FL (ref 79–97)
MODALITY: ABNORMAL
MONOCYTES # BLD AUTO: 0.44 10*3/MM3 (ref 0.1–0.9)
MONOCYTES NFR BLD AUTO: 8.8 % (ref 5–12)
NEUTROPHILS # BLD AUTO: 3.8 10*3/MM3 (ref 1.7–7)
NEUTROPHILS NFR BLD AUTO: 76.2 % (ref 42.7–76)
NRBC BLD AUTO-RTO: 0 /100 WBC (ref 0–0.2)
NT-PROBNP SERPL-MCNC: 104.8 PG/ML (ref 5–900)
PCO2 BLDA: 45.8 MM HG (ref 35–45)
PH BLDA: 7.39 PH UNITS (ref 7.35–7.45)
PLATELET # BLD AUTO: 165 10*3/MM3 (ref 140–450)
PMV BLD AUTO: 10.4 FL (ref 6–12)
PO2 BLDA: 87.8 MM HG (ref 80–100)
POTASSIUM BLD-SCNC: 3.9 MMOL/L (ref 3.5–5.2)
PROT SERPL-MCNC: 6.6 G/DL (ref 6–8.5)
RBC # BLD AUTO: 4.28 10*6/MM3 (ref 3.77–5.28)
RIGHT ARM BP: NORMAL MMHG
SAO2 % BLDCOA: 96.6 % (ref 92–99)
SET MECH RESP RATE: 18
SODIUM BLD-SCNC: 131 MMOL/L (ref 136–145)
STRESS TARGET HR: 130 BPM
T4 FREE SERPL-MCNC: 1.11 NG/DL (ref 0.93–1.7)
TOTAL RATE: 18 BREATHS/MINUTE
TRIGL SERPL-MCNC: 403 MG/DL (ref 0–150)
TROPONIN T SERPL-MCNC: <0.01 NG/ML (ref 0–0.03)
TSH SERPL DL<=0.05 MIU/L-ACNC: 5.25 UIU/ML (ref 0.27–4.2)
VLDLC SERPL-MCNC: ABNORMAL MG/DL
WBC NRBC COR # BLD: 4.99 10*3/MM3 (ref 3.4–10.8)

## 2019-09-06 PROCEDURE — 96361 HYDRATE IV INFUSION ADD-ON: CPT

## 2019-09-06 PROCEDURE — 84484 ASSAY OF TROPONIN QUANT: CPT | Performed by: FAMILY MEDICINE

## 2019-09-06 PROCEDURE — 93306 TTE W/DOPPLER COMPLETE: CPT

## 2019-09-06 PROCEDURE — 83880 ASSAY OF NATRIURETIC PEPTIDE: CPT | Performed by: FAMILY MEDICINE

## 2019-09-06 PROCEDURE — 86900 BLOOD TYPING SEROLOGIC ABO: CPT

## 2019-09-06 PROCEDURE — 93880 EXTRACRANIAL BILAT STUDY: CPT

## 2019-09-06 PROCEDURE — 93306 TTE W/DOPPLER COMPLETE: CPT | Performed by: INTERNAL MEDICINE

## 2019-09-06 PROCEDURE — 80053 COMPREHEN METABOLIC PANEL: CPT | Performed by: FAMILY MEDICINE

## 2019-09-06 PROCEDURE — 86901 BLOOD TYPING SEROLOGIC RH(D): CPT

## 2019-09-06 PROCEDURE — G0378 HOSPITAL OBSERVATION PER HR: HCPCS

## 2019-09-06 PROCEDURE — 96366 THER/PROPH/DIAG IV INF ADDON: CPT

## 2019-09-06 PROCEDURE — 83735 ASSAY OF MAGNESIUM: CPT | Performed by: FAMILY MEDICINE

## 2019-09-06 PROCEDURE — 63710000001 INSULIN GLARGINE PER 5 UNITS: Performed by: FAMILY MEDICINE

## 2019-09-06 PROCEDURE — 83690 ASSAY OF LIPASE: CPT | Performed by: FAMILY MEDICINE

## 2019-09-06 PROCEDURE — 25010000002 ENOXAPARIN PER 10 MG: Performed by: FAMILY MEDICINE

## 2019-09-06 PROCEDURE — 83036 HEMOGLOBIN GLYCOSYLATED A1C: CPT | Performed by: FAMILY MEDICINE

## 2019-09-06 PROCEDURE — A9577 INJ MULTIHANCE: HCPCS | Performed by: FAMILY MEDICINE

## 2019-09-06 PROCEDURE — 84443 ASSAY THYROID STIM HORMONE: CPT | Performed by: FAMILY MEDICINE

## 2019-09-06 PROCEDURE — 84439 ASSAY OF FREE THYROXINE: CPT | Performed by: FAMILY MEDICINE

## 2019-09-06 PROCEDURE — 93970 EXTREMITY STUDY: CPT

## 2019-09-06 PROCEDURE — 82150 ASSAY OF AMYLASE: CPT | Performed by: FAMILY MEDICINE

## 2019-09-06 PROCEDURE — 25010000003 CEFTRIAXONE PER 250 MG: Performed by: FAMILY MEDICINE

## 2019-09-06 PROCEDURE — 94799 UNLISTED PULMONARY SVC/PX: CPT

## 2019-09-06 PROCEDURE — 36600 WITHDRAWAL OF ARTERIAL BLOOD: CPT

## 2019-09-06 PROCEDURE — 80061 LIPID PANEL: CPT | Performed by: FAMILY MEDICINE

## 2019-09-06 PROCEDURE — 83721 ASSAY OF BLOOD LIPOPROTEIN: CPT | Performed by: FAMILY MEDICINE

## 2019-09-06 PROCEDURE — 70553 MRI BRAIN STEM W/O & W/DYE: CPT

## 2019-09-06 PROCEDURE — 63710000001 INSULIN LISPRO (HUMAN) PER 5 UNITS: Performed by: FAMILY MEDICINE

## 2019-09-06 PROCEDURE — 82803 BLOOD GASES ANY COMBINATION: CPT

## 2019-09-06 PROCEDURE — 0 GADOBENATE DIMEGLUMINE 529 MG/ML SOLUTION: Performed by: FAMILY MEDICINE

## 2019-09-06 PROCEDURE — 96372 THER/PROPH/DIAG INJ SC/IM: CPT

## 2019-09-06 PROCEDURE — 82962 GLUCOSE BLOOD TEST: CPT

## 2019-09-06 PROCEDURE — 85025 COMPLETE CBC W/AUTO DIFF WBC: CPT | Performed by: FAMILY MEDICINE

## 2019-09-06 RX ORDER — ROSUVASTATIN CALCIUM 20 MG/1
20 TABLET, COATED ORAL NIGHTLY
Status: DISCONTINUED | OUTPATIENT
Start: 2019-09-07 | End: 2019-09-07 | Stop reason: HOSPADM

## 2019-09-06 RX ORDER — INSULIN GLARGINE 100 [IU]/ML
36 INJECTION, SOLUTION SUBCUTANEOUS
Status: DISCONTINUED | OUTPATIENT
Start: 2019-09-07 | End: 2019-09-07 | Stop reason: HOSPADM

## 2019-09-06 RX ORDER — OXYCODONE AND ACETAMINOPHEN 7.5; 325 MG/1; MG/1
1 TABLET ORAL EVERY 6 HOURS PRN
Status: DISCONTINUED | OUTPATIENT
Start: 2019-09-06 | End: 2019-09-07 | Stop reason: HOSPADM

## 2019-09-06 RX ORDER — ALPRAZOLAM 0.5 MG/1
1 TABLET ORAL EVERY 8 HOURS PRN
Status: DISCONTINUED | OUTPATIENT
Start: 2019-09-06 | End: 2019-09-07 | Stop reason: HOSPADM

## 2019-09-06 RX ORDER — LEVOTHYROXINE SODIUM 0.05 MG/1
50 TABLET ORAL
Status: DISCONTINUED | OUTPATIENT
Start: 2019-09-07 | End: 2019-09-07 | Stop reason: HOSPADM

## 2019-09-06 RX ADMIN — Medication 1000 UNITS: at 08:08

## 2019-09-06 RX ADMIN — ENOXAPARIN SODIUM 40 MG: 40 INJECTION SUBCUTANEOUS at 17:01

## 2019-09-06 RX ADMIN — INSULIN GLARGINE 30 UNITS: 100 INJECTION, SOLUTION SUBCUTANEOUS at 08:07

## 2019-09-06 RX ADMIN — AMLODIPINE BESYLATE 5 MG: 5 TABLET ORAL at 08:07

## 2019-09-06 RX ADMIN — PIOGLITAZONE 30 MG: 30 TABLET ORAL at 08:07

## 2019-09-06 RX ADMIN — INSULIN LISPRO 5 UNITS: 100 INJECTION, SOLUTION INTRAVENOUS; SUBCUTANEOUS at 12:04

## 2019-09-06 RX ADMIN — VALSARTAN 160 MG: 160 TABLET, FILM COATED ORAL at 08:07

## 2019-09-06 RX ADMIN — OXYCODONE HYDROCHLORIDE AND ACETAMINOPHEN 1 TABLET: 7.5; 325 TABLET ORAL at 20:02

## 2019-09-06 RX ADMIN — ASPIRIN 81 MG: 81 TABLET, COATED ORAL at 08:07

## 2019-09-06 RX ADMIN — SODIUM CHLORIDE 80 ML/HR: 9 INJECTION, SOLUTION INTRAVENOUS at 08:04

## 2019-09-06 RX ADMIN — FLUTICASONE PROPIONATE 2 SPRAY: 50 SPRAY, METERED NASAL at 08:07

## 2019-09-06 RX ADMIN — Medication 1 TABLET: at 08:07

## 2019-09-06 RX ADMIN — ESCITALOPRAM 10 MG: 10 TABLET, FILM COATED ORAL at 08:07

## 2019-09-06 RX ADMIN — Medication 10 ML: at 08:08

## 2019-09-06 RX ADMIN — CEFTRIAXONE SODIUM 2 G: 2 INJECTION, SOLUTION INTRAVENOUS at 12:25

## 2019-09-06 RX ADMIN — ALPRAZOLAM 1 MG: 0.5 TABLET ORAL at 17:00

## 2019-09-06 RX ADMIN — LINAGLIPTIN 5 MG: 5 TABLET, FILM COATED ORAL at 08:08

## 2019-09-06 RX ADMIN — ACETAMINOPHEN 650 MG: 325 TABLET, FILM COATED ORAL at 11:09

## 2019-09-06 RX ADMIN — GADOBENATE DIMEGLUMINE 15 ML: 529 INJECTION, SOLUTION INTRAVENOUS at 18:05

## 2019-09-06 RX ADMIN — PANTOPRAZOLE SODIUM 40 MG: 40 TABLET, DELAYED RELEASE ORAL at 06:19

## 2019-09-06 NOTE — PROGRESS NOTES
Marlyn DOTSON Rosario  69 y.o.  1950  [id number]  [id number]  09/05/19     LOS: 0 days   Patient Care Team:  Reyes, Rosenberg Acosta, MD as PCP - General (Family Medicine)  Addie Renee as PCP - Claims Attributed    Chief Complaint   Patient presents with   • Shoulder Pain   • Fatigue     Subjective   See H&P.    Objective     LABORATORY DATA:  Lab Results (last 24 hours)     Procedure Component Value Units Date/Time    POC Glucose Once [[id number]]  (Abnormal) Collected:  09/05/19 2111    Specimen:  Blood Updated:  09/05/19 2217     Glucose 182 mg/dL     D-dimer, Quantitative [[id number]]  (Normal) Collected:  09/05/19 1730    Specimen:  Blood from Hand, Right Updated:  09/05/19 1908     D-Dimer, Quantitative <0.27 MCGFEU/mL     Narrative:       The [facility]ago D-Dimer test used in conjunction with a clinical pretest probability (PTP) assessment model, has been approved by the FDA to rule out the presence of venous thromboembolism (VTE) in outpatients suspected of deep venous thrombosis (DVT) or pulmonary embolism (PE). The cut-off for negative predictive value is <0.50 MCGFEU/mL.    Protime-INR [[id number]]  (Normal) Collected:  09/05/19 1730    Specimen:  Blood from Hand, Right Updated:  09/05/19 1908     Protime 13.3 Seconds      INR 1.04    aPTT [[id number]]  (Normal) Collected:  09/05/19 1730    Specimen:  Blood from Hand, Right Updated:  09/05/19 1908     PTT 25.4 seconds     Fibrinogen [[id number]]  (Normal) Collected:  09/05/19 1730    Specimen:  Blood from Hand, Right Updated:  09/05/19 1908     Fibrinogen 418 mg/dL     Vitamin B12 [[id number]]  (Normal) Collected:  09/05/19 1730    Specimen:  Blood from Hand, Right Updated:  09/05/19 1840     Vitamin B-12 441 pg/mL     Folate [[id number]]  (Normal) Collected:  09/05/19 1730    Specimen:  Blood from Hand, Right Updated:  09/05/19 1840     Folate 19.00 ng/mL     Rheumatoid Factor [[id number]]  (Normal) Collected:  09/05/19 1730    Specimen:  Blood from Hand, Right  Updated:  09/05/19 1836     Rheumatoid Factor Quantitative <10.0 IU/mL     BNP [[id number]]  (Normal) Collected:  09/05/19 1730    Specimen:  Blood from Hand, Right Updated:  09/05/19 1831     proBNP 48.9 pg/mL     Narrative:       Among patients with dyspnea, NT-proBNP is highly sensitive for the detection of acute congestive heart failure. In addition NT-proBNP of <300 pg/ml effectively rules out acute congestive heart failure with 99% negative predictive value.    Troponin [[id number]]  (Normal) Collected:  09/05/19 1730    Specimen:  Blood from Hand, Right Updated:  09/05/19 1831     Troponin T <0.010 ng/mL     Narrative:       Troponin T Reference Range:  <= 0.03 ng/mL-   Negative for AMI  >0.03 ng/mL-     Abnormal for myocardial necrosis.  Clinicians would have to utilize clinical acumen, EKG, Troponin and serial changes to determine if it is an Acute Myocardial Infarction or myocardial injury due to an underlying chronic condition.     Ferritin [[id number]]  (Normal) Collected:  09/05/19 1730    Specimen:  Blood from Hand, Right Updated:  09/05/19 1831     Ferritin 36.00 ng/mL     Iron Profile [[id number]]  (Abnormal) Collected:  09/05/19 1730    Specimen:  Blood from Hand, Right Updated:  09/05/19 1822     Iron 56 mcg/dL      Iron Saturation 12 %      Transferrin 316 mg/dL      TIBC 471 mcg/dL     CK [[id number]]  (Normal) Collected:  09/05/19 1730    Specimen:  Blood from Hand, Right Updated:  09/05/19 1822     Creatine Kinase 91 U/L     C-reactive Protein [[id number]]  (Abnormal) Collected:  09/05/19 1730    Specimen:  Blood from Hand, Right Updated:  09/05/19 1822     C-Reactive Protein 0.51 mg/dL     High Sensitivity CRP [[id number]]  (Normal) Collected:  09/05/19 1730    Specimen:  Blood from Hand, Right Updated:  09/05/19 1822     C-Reactive Protein 0.463 mg/dL     Narrative:       Relative Risk Category      Average hs-CRP level    Low                         <0.1 mg/dl  Average                     0.1 mg/dl  - 0.3 mg/dl  High                        >0.3 mg/dl    Uric Acid [[id number]]  (Normal) Collected:  09/05/19 1730    Specimen:  Blood from Hand, Right Updated:  09/05/19 1822     Uric Acid 2.7 mg/dL     Sedimentation Rate [[id number]]  (Normal) Collected:  09/05/19 1730    Specimen:  Blood from Hand, Right Updated:  09/05/19 1814     Sed Rate 22 mm/hr     POC Glucose Once [[id number]]  (Abnormal) Collected:  09/05/19 1804    Specimen:  Blood Updated:  09/05/19 1807     Glucose 162 mg/dL     Reticulocytes [[id number]]  (Normal) Collected:  09/05/19 1730    Specimen:  Blood from Hand, Right Updated:  09/05/19 1750     Reticulocyte % 1.33 %      Reticulocyte Absolute 0.0626 10*6/mm3     Insulin, Random [[id number]] Collected:  09/05/19 1730    Specimen:  Blood from Hand, Right Updated:  09/05/19 1745    C-Peptide [[id number]] Collected:  09/05/19 1730    Specimen:  Blood from Hand, Right Updated:  09/05/19 1745    Folate RBC [[id number]] Collected:  09/05/19 1730    Specimen:  Blood from Hand, Right Updated:  09/05/19 1745    RAJ [[id number]] Collected:  09/05/19 1730    Specimen:  Blood from Hand, Right Updated:  09/05/19 1745    Thyroglobulin [[id number]] Collected:  09/05/19 1730    Specimen:  Blood from Hand, Right Updated:  09/05/19 1744    Anti-Thyroglobulin Antibody [[id number]] Collected:  09/05/19 1730    Specimen:  Blood from Hand, Right Updated:  09/05/19 1744    Thyroid Peroxidase Antibody [[id number]] Collected:  09/05/19 1730    Specimen:  Blood from Hand, Right Updated:  09/05/19 1744    Urine Culture - Urine, Urine, Clean Catch [[id number]] Collected:  09/05/19 1123    Specimen:  Urine, Clean Catch Updated:  09/05/19 1305    Procalcitonin [[id number]]  (Abnormal) Collected:  09/05/19 1031    Specimen:  Blood Updated:  09/05/19 1157     Procalcitonin <0.02 ng/mL     Narrative:       As a Marker for Sepsis (Non-Neonates):   1. <0.5 ng/mL represents a low risk of severe sepsis and/or septic shock.  1. >2 ng/mL  "represents a high risk of severe sepsis and/or septic shock.    As a Marker for Lower Respiratory Tract Infections that require antibiotic therapy:  PCT on Admission     Antibiotic Therapy             6-12 Hrs later  > 0.5                Strongly Recommended            >0.25 - <0.5         Recommended  0.1 - 0.25           Discouraged                   Remeasure/reassess PCT  <0.1                 Strongly Discouraged          Remeasure/reassess PCT      As 28 day mortality risk marker: \"Change in Procalcitonin Result\" (> 80 % or <=80 %) if Day 0 (or Day 1) and Day 4 values are available. Refer to http://[email or web address]iencepct-calculator.com/   Change in PCT <=80 %   A decrease of PCT levels below or equal to 80 % defines a positive change in PCT test result representing a higher risk for 28-day all-cause mortality of patients diagnosed with severe sepsis or septic shock.  Change in PCT > 80 %   A decrease of PCT levels of more than 80 % defines a negative change in PCT result representing a lower risk for 28-day all-cause mortality of patients diagnosed with severe sepsis or septic shock.                Urine Drug Screen - Urine, Clean Catch [[id number]]  (Abnormal) Collected:  09/05/19 1123    Specimen:  Urine, Clean Catch Updated:  09/05/19 1157     Amphet/Methamphet, Screen Negative     Barbiturates Screen, Urine Negative     Benzodiazepine Screen, Urine Positive     Cocaine Screen, Urine Negative     Opiate Screen Negative     THC, Screen, Urine Negative     Methadone Screen, Urine Negative     Oxycodone Screen, Urine Positive    Narrative:       Negative Thresholds For Drugs Screened:     Amphetamines               500 ng/ml   Barbiturates               200 ng/ml   Benzodiazepines            100 ng/ml   Cocaine                    300 ng/ml   Methadone                  300 ng/ml   Opiates                    300 ng/ml   Oxycodone                  100 ng/ml   THC                        50 ng/ml    The Normal Value for all " drugs tested is negative. This report includes final unconfirmed screening results to be used for medical treatment purposes only. Unconfirmed results must not be used for non-medical purposes such as employment or legal testing. Clinical consideration should be applied to any drug of abuse test, particulary when unconfirmed results are used.    Lactic Acid, Plasma [[id number]]  (Normal) Collected:  09/05/19 1130    Specimen:  Blood Updated:  09/05/19 1155     Lactate 1.8 mmol/L     Urinalysis, Microscopic Only - Urine, Clean Catch [[id number]]  (Abnormal) Collected:  09/05/19 1123    Specimen:  Urine, Clean Catch Updated:  09/05/19 1148     RBC, UA 0-2 /HPF      WBC, UA Too Numerous to Count /HPF      Bacteria, UA 4+ /HPF      Squamous Epithelial Cells, UA 0-2 /HPF      Hyaline Casts, UA 0-2 /LPF      Methodology Automated Microscopy    Urinalysis With Microscopic If Indicated (No Culture) - Urine, Clean Catch [[id number]]  (Abnormal) Collected:  09/05/19 1123    Specimen:  Urine, Clean Catch Updated:  09/05/19 1148     Color, UA Yellow     Appearance, UA Cloudy     pH, UA 5.5     Specific Gravity, UA 1.009     Glucose,  mg/dL (1+)     Ketones, UA Negative     Bilirubin, UA Negative     Blood, UA Trace     Protein, UA Negative     Leuk Esterase, UA Large (3+)     Nitrite, UA Negative     Urobilinogen, UA 0.2 E.U./dL    Troponin [[id number]]  (Normal) Collected:  09/05/19 1031    Specimen:  Blood Updated:  09/05/19 1147     Troponin T <0.010 ng/mL     Narrative:       Troponin T Reference Range:  <= 0.03 ng/mL-   Negative for AMI  >0.03 ng/mL-     Abnormal for myocardial necrosis.  Clinicians would have to utilize clinical acumen, EKG, Troponin and serial changes to determine if it is an Acute Myocardial Infarction or myocardial injury due to an underlying chronic condition.     POC Glucose Once [[id number]]  (Abnormal) Collected:  09/05/19 1140    Specimen:  Blood Updated:  09/05/19 1146     Glucose 173 mg/dL      [place] [facility] [[id number]] Collected:  09/05/19 1031    Specimen:  Blood Updated:  09/05/19 1145    Narrative:       The following orders were created for panel order [place] [facility].  Procedure                               Abnormality         Status                     ---------                               -----------         ------                     Light Blue Top[[id number]]                                   Final result               Green Top (Gel)[[id number]]                                  Final result               Lavender Top[[id number]]                                     Final result               Gold Top - SST[[id number]]                                   Final result                 Please view results for these tests on the individual orders.    Light Blue Top [[id number]] Collected:  09/05/19 1031    Specimen:  Blood Updated:  09/05/19 1145     Extra Tube hold for add-on     Comment: Auto resulted       Green Top (Gel) [[id number]] Collected:  09/05/19 1031    Specimen:  Blood Updated:  09/05/19 1145     Extra Tube Hold for add-ons.     Comment: Auto resulted.       Lavender Top [[id number]] Collected:  09/05/19 1031    Specimen:  Blood Updated:  09/05/19 1145     Extra Tube hold for add-on     Comment: Auto resulted       Gold Top - SST [[id number]] Collected:  09/05/19 1031    Specimen:  Blood Updated:  09/05/19 1145     Extra Tube Hold for add-ons.     Comment: Auto resulted.       Comprehensive Metabolic Panel [[id number]]  (Abnormal) Collected:  09/05/19 1031    Specimen:  Blood Updated:  09/05/19 1142     Glucose 240 mg/dL      BUN 10 mg/dL      Creatinine 0.67 mg/dL      Sodium 135 mmol/L      Potassium 4.0 mmol/L      Chloride 95 mmol/L      CO2 26.7 mmol/L      Calcium 9.1 mg/dL      Total Protein 7.1 g/dL      Albumin 4.30 g/dL      ALT (SGPT) 47 U/L      AST (SGOT) 27 U/L      Alkaline Phosphatase 150 U/L      Total Bilirubin 0.3 mg/dL      eGFR Non African Amer 88 mL/min/1.73      Globulin 2.8  gm/dL      A/G Ratio 1.5 g/dL      BUN/Creatinine Ratio 14.9     Anion Gap 13.3 mmol/L     Narrative:       GFR Normal >60  Chronic Kidney Disease <60  Kidney Failure <15    Magnesium [[id number]]  (Normal) Collected:  09/05/19 1031    Specimen:  Blood Updated:  09/05/19 1142     Magnesium 1.6 mg/dL     Ethanol [[id number]] Collected:  09/05/19 1031    Specimen:  Blood Updated:  09/05/19 1142     Ethanol <10 mg/dL      Ethanol % <0.010 %     Blood Culture - Blood, Arm, Left [[id number]] Collected:  09/05/19 1133    Specimen:  Blood from Arm, Left Updated:  09/05/19 1138    Blood Culture - Blood, Arm, Left [[id number]] Collected:  09/05/19 1130    Specimen:  Blood from Arm, Left Updated:  09/05/19 1137    Protime-INR [[id number]]  (Normal) Collected:  09/05/19 1031    Specimen:  Blood Updated:  09/05/19 1129     Protime 13.2 Seconds      INR 1.03    CBC & Differential [[id number]] Collected:  09/05/19 1031    Specimen:  Blood Updated:  09/05/19 1125    Narrative:       The following orders were created for panel order CBC & Differential.  Procedure                               Abnormality         Status                     ---------                               -----------         ------                     CBC Auto Differential[[id number]]        Abnormal            Final result                 Please view results for these tests on the individual orders.    CBC Auto Differential [[id number]]  (Abnormal) Collected:  09/05/19 1031    Specimen:  Blood Updated:  09/05/19 1125     WBC 6.77 10*3/mm3      RBC 4.67 10*6/mm3      Hemoglobin 13.8 g/dL      Hematocrit 42.2 %      MCV 90.4 fL      MCH 29.6 pg      MCHC 32.7 g/dL      RDW 13.3 %      RDW-SD 43.9 fl      MPV 10.1 fL      Platelets 242 10*3/mm3      Neutrophil % 86.5 %      Lymphocyte % 6.8 %      Monocyte % 5.9 %      Eosinophil % 0.0 %      Basophil % 0.4 %      Immature Grans % 0.4 %      Neutrophils, Absolute 5.85 10*3/mm3      Lymphocytes, Absolute 0.46 10*3/mm3       Monocytes, Absolute 0.40 10*3/mm3      Eosinophils, Absolute 0.00 10*3/mm3      Basophils, Absolute 0.03 10*3/mm3      Immature Grans, Absolute 0.03 10*3/mm3      nRBC 0.0 /100 WBC       RADIOGRAPHIC DATA:    See Previous Notes.    Vital Signs  Temp:  [97.5 °F (36.4 °C)-98.2 °F (36.8 °C)] 97.7 °F (36.5 °C)  Heart Rate:  [64-97] 69  Resp:  [16-18] 16  BP: (131-165)/(64-75) 148/64    Physical Exam  PHYSICAL EXAM:    VITAL SIGNS:  T Max  97.7  AL  69  RR  16  B/P  148/64  BMI  26.3.    GENERAL:  Fairly nourish.  Fairly developed.  Overweight.  Fair Affect.    SKIN:  Not diaphoretic.  Fair skin turgor.  Not jaundice.    HEENMT:  Normocephalic/Atraumatic.  Pinkish palpebral conjunctiva.  Anicteric sclerae.  PERRLA.  EOMI.  Oral mucosal membrane are moist.  Pharynx is not red.    NECK:  Supple.  No JVD.  No bruits.    THORAX AND LUNGS:  Clear to Auscultation.  No rhonchi, wheeze, nor rales.  No tenderness upon deep palpation of the costochondral junction.    CARDIOVASCULAR SYSTEM:  Regular rate and rhythm, no murmur.  Normal S1 / S2.  No S3 / S4.  No heaves.    ABDOMEN:  No Hepatosplenomegaly.  Soft.  Not tender.  Not distended.  Positive  bowel sounds.    EXTREMITIES:  No cyanosis, clubbing, nor edema.  No calf tenderness.    CNS:  Alert.  Oriented x 3.  Cranial nerves are intact.  Sensory / Motor are intact.    Results Review:     I reviewed the patient's new clinical results.  Discussed with patient and family.    Assessment/Plan       0)  Recurrent Urinary Tract Infection with Altered Mental Status Changes (Urosepsis) with a History of Cerebrovascular Accident with Left Foot Drop.    1)  Hematuria.    2)  Fatty Liver.    3)  Glucosuria.    4)  Hyponatremia.    5)  Hyperglycemia.    6)  Carotid Artery Stenosis.    7)  Metabolic Encephalopathy.    8)  History of Asthma.    9)  History of Fatty Liver.    10)  History of Hypertension.    11)  History of Osteoarthritis.    12)  History of Hyperlipidemia.    13)   History of Nephrolithiasis.    14)  History of Hypothyroidism.    15)  History of Tobacco Abuse.    16)  History of Myofascial Pain.    17)  History of Allergic Rhinitis.    18)  History of Diabetic Neuropathy.    19)  History of Vitamin D Deficiency.    20)  History of Migraine Headaches.    21)  History of Anxiety and Depression.    22)  History of Recurrent Urinary Tract Infection.    23)  History of Gastroesophageal Reflux Disease.    24)  History of Low Back Pain with Radiculopathy.    25)  History of Uncontrolled Diabetes Mellitus Type 2.    26)  History of Chronic Obstructive Pulmonary Disease.    27)  History of Cerebrovascular Accident with Left Foot Drop.    PLAN:  See H&P.      Recurrent urinary tract infection with altered mental status changes (urosepsis) with a history of cerebrovascular accident with left foot drop    Hematuria    Fatty liver    Glucosuria    Hyponatremia    Hyperglycemia    Carotid artery stenosis    Metabolic encephalopathy    Rosenberg Acosta Reyes, MD  09/05/19  11:24 PM

## 2019-09-06 NOTE — SIGNIFICANT NOTE
Dr Reyes returned call after 2 calls placed. Informed pt in severe pain and concerned about abruptly stopping her xanax, pt requesting something for pain and her nerves. Dr Reyes states he does not want pt to have pain meds or xanax. Pt informed

## 2019-09-06 NOTE — PROGRESS NOTES
Discharge Planning Assessment  [facility]     Patient Name: Marlyn Rosario  MRN: [id number]  Today's Date: 9/6/2019    Admit Date: 9/5/2019    Discharge Needs Assessment     Row Name 09/06/19 1528       Living Environment    Lives With  child(torin), adult    Name(s) of Who Lives With Patient  Toni nelson [phone]    Current Living Arrangements  home/apartment/condo    Primary Care Provided by  self    Provides Primary Care For  no one    Family Caregiver if Needed  child(torin), adult    Family Caregiver Names  Toni [phone]    Quality of Family Relationships  supportive    Able to Return to Prior Arrangements  yes       Resource/Environmental Concerns    Resource/Environmental Concerns  none    Transportation Concerns  car, none       Transition Planning    Patient/Family Anticipates Transition to  home with family    Patient/Family Anticipated Services at Transition  none    Transportation Anticipated  family or friend will provide       Discharge Needs Assessment    Readmission Within the Last 30 Days  no previous admission in last 30 days    Concerns to be Addressed  no discharge needs identified;denies needs/concerns at this time    Equipment Currently Used at Home  cane, straight;glucometer    Anticipated Changes Related to Illness  none    Equipment Needed After Discharge  none    Offered/Gave Vendor List  no    Patient's Choice of Community Agency(s)  No needs for HH or SNF         Discharge Plan     Row Name 09/06/19 1532       Plan    Plan  Home, denies needs    Patient/Family in Agreement with Plan  yes    Plan Comments  [place] 9/6/2019. Met with patient, family and Toni [phone] at bedside, patient granted me permission to speak to her while family remained in the room.  Face sheet verified. Prior to admission patient was independent with all aspects of her ADL's including driving. Patient has a cane and glucometer. Patient uses the [facility] on [address],  denies issues obtaining, affording or taking her medications.  Patient does not have POA or living will but states her children know her wishes. Discharge plans discussed, plan is to return home, no needs identified. Family will transport. Will continue to monitor for new or changing discharge needs.        Destination      No service coordination in this encounter.      Durable Medical Equipment      No service coordination in this encounter.      Dialysis/Infusion      No service coordination in this encounter.      Home Medical Care      No service coordination in this encounter.      Therapy      No service coordination in this encounter.      Community Resources      No service coordination in this encounter.          Demographic Summary     Row Name 09/06/19 1527       General Information    Admission Type  observation    Arrived From  emergency department    Required Notices Provided  Observation Status Notice    Referral Source  admission list    Reason for Consult  discharge planning    Preferred Language  English     Used During This Interaction  no       Contact Information    Permission Granted to Share Info With  family/designee Toni nelson [phone]        Functional Status     Row Name 09/06/19 1527       Functional Status    Usual Activity Tolerance  good    Current Activity Tolerance  good       Functional Status, IADL    Medications  independent    Meal Preparation  independent    Housekeeping  independent    Laundry  independent    Shopping  independent       Mental Status    General Appearance WDL  WDL       Mental Status Summary    Recent Changes in Mental Status/Cognitive Functioning  no changes       Employment/    Employment Status  retired        Psychosocial    No documentation.       Abuse/Neglect    No documentation.       Legal    No documentation.       Substance Abuse    No documentation.       Patient Forms    No documentation.           Carol Dubois RN

## 2019-09-06 NOTE — H&P
Marlyn Rosario  69 y.o.  1950  [id number]  [id number]  09/05/19    Patient Care Team:  Reyes, Rosenberg Acosta, MD as PCP - General (Family Medicine)  Addie Renee as PCP - Claims Attributed    Chief Complaint   Patient presents with   • Shoulder Pain   • Fatigue     CHIEF COMPLAINT:  Fatigue and confuse.    HPI  HISTORY OF PRESENT ILLNESS:  The patient is a 69 years old white female with a known past medical history of Asthma, Fatty Liver, Hypertension, Osteoarthritis, Hyperlipidemia, Nephrolithiasis, Hypothyroidism, Tobacco Abuse, Myofascial Pain, Allergic Rhinitis, Diabetic Neuropathy, Vitamin D Deficiency, Migraine Headaches, Recurrent Urinary Tract Infection,Gastroesophageal Reflux Disease, Low Back Pain with Radiculopathy, Uncontrolled Diabetes Mellitus Type 2, Chronic Obstructive Pulmonary Disease, Cerebrovascular Accident with Left Foot Drop, and Anxiety and Depression who went to the emergency room due to fatigue and confusion.  The present condition started 2 days prior to admission when she felt that she was getting another urinary tract infection in which she was starting to get confuse and not feeling well.  She states that she felt nauseated, had vomited, had diarrhea, has right-sided abdominal pain, has urinary frequency, and has painful urination   She states that this usual occurs whenever she starts having a urinary tract infection.  In the emergency room, she looked confused but also seems to be at her baseline.  She was noted to have possible altered mentation with metabolic encephalopathy.  Due to her past history of cerebrovascular accident with left foot drop, she will be admitted for further evaluation and management to rule out possible recurrent cerebrovascular accident.    PAST MEDICAL HISTORY:    1)  Asthma.    2)  Fatty Liver.    3)  Hypertension.    4)  Osteoarthritis.    5)  Hyperlipidemia.    6)  Nephrolithiasis.    7)  Hypothyroidism.    8)  Tobacco Abuse.    9)   Myofascial Pain.    10)  Allergic Rhinitis.    11)  Diabetic Neuropathy.    12)  Vitamin D Deficiency.    13)  Migraine Headaches.    14)  Anxiety and Depression.    15)  Recurrent Urinary Tract Infection.    16)  Gastroesophageal Reflux Disease.    17)  Low Back Pain with Radiculopathy.    18)  Uncontrolled Diabetes Mellitus Type 2.    19)  Chronic Obstructive Pulmonary Disease.    20)  Cerebrovascular Accident with Left Foot Drop.    PAST SURGICAL HISTORY:    1)  Hysterectomy.    2)  Bunionectomy.    3)  Right Arm Surgery.    4)   Section due to RH (-).    5)  Cholecystectomy with Cholangiography.    OBSTETRICAL/GYNECOLOGICAL HISTORY:  First menstruation was at 14 years old.  She has been regular.  She is -0-0-3, delivering 2 boys and 1 girl via normal spontaneous delivery without miscarriage.  Her last menstrual period was at 45 years old due to her hysterectomy in .    ALLERGIES:  NEURONTIN, LYRICA, AND WELLBUTRIN.    Allergies   Allergen Reactions   • Gabapentin    • Lyrica [Pregabalin]    • Wellbutrin [Bupropion]      MEDICATIONS:    1)  ASA 81 mg 1 tab PO QD.    2)  Actos 30 mg 1 tab PO QD.    3)  Norvasc 5 mg 1 tab PO QD.    4)  Crestor 20 mg 1 tab PO QD.    5)  Diovan 160 mg 1 tab PO QD.    6)  Lexapro 10 mg 1 tab PO QD.    7)  Protonix 40 mg 1 tab PO QD.    8)  Tradjenta 5 mg 1 tab PO QD.    9)  Xanax 1 mg 1 tab PO TID PRN.    10)  Synthroid 50 mcg 1 tab PO QD.    11)  Prenatal Vitamins 1 tab PO QD.    12)  Metformin 1000 mg 1 tab PO BID.    13)  Tresiba 200 U 32 units SQ Q Daily.    14)  Flonase 1-2 sprays/nostril QD/BID.    15)  Vitamin E 1000 units 1 tab PO Q Daily.    16)  Roxicodone 15 mg 1 tab PO QID PRN.    17)  Progesterone  mg 1 tab PO QHS.    18)  Vitamin D2 50,000 units 1 tab PO Q Weekly.    MEDICATION IN THE WARDS:    Current Facility-Administered Medications:   •  acetaminophen (TYLENOL) tablet 650 mg, 650 mg, Oral, Q4H PRN, Reyes, Rosenberg Acosta, MD  •  [START ON  9/6/2019] cefTRIAXone (ROCEPHIN) IVPB 2 g, 2 g, Intravenous, Q24H, Reyes, Rosenberg Acosta, MD  •  enoxaparin (LOVENOX) syringe 40 mg, 40 mg, Subcutaneous, Q24H, Reyes, Rosenberg Acosta, MD, 40 mg at 09/05/19 2041  •  fluticasone (FLONASE) 50 MCG/ACT nasal spray 2 spray, 2 spray, Nasal, Daily, Reyes, Rosenberg Acosta, MD  •  linagliptin (TRADJENTA) tablet 5 mg, 5 mg, Oral, Daily, Reyes, Rosenberg Acosta, MD, 5 mg at 09/05/19 1851  •  magnesium sulfate 2g/50 mL (PREMIX) infusion, 2 g, Intravenous, PRN, Reyes, Rosenberg Acosta, MD  •  [START ON 9/6/2019] pantoprazole (PROTONIX) EC tablet 40 mg, 40 mg, Oral, Q AM, Reyes, Rosenberg Acosta, MD  •  pioglitazone (ACTOS) tablet 30 mg, 30 mg, Oral, Daily, Reyes, Rosenberg Acosta, MD, 30 mg at 09/05/19 1851  •  potassium chloride (MICRO-K) CR capsule 40 mEq, 40 mEq, Oral, PRN **OR** potassium chloride (KLOR-CON) packet 40 mEq, 40 mEq, Oral, PRN **OR** potassium chloride 10 mEq in 100 mL IVPB, 10 mEq, Intravenous, Q1H PRN, Reyes, Rosenberg Acosta, MD  •  [COMPLETED] Insert peripheral IV, , , Once **AND** sodium chloride 0.9 % flush 10 mL, 10 mL, Intravenous, PRN, Vikram Tay MD  •  sodium chloride 0.9 % flush 10 mL, 10 mL, Intravenous, Q12H, Reyes, Rosenberg Acosta, MD  •  sodium chloride 0.9 % flush 10 mL, 10 mL, Intravenous, PRN, Reyes, Rosenberg Acosta, MD  •  sodium chloride 0.9 % infusion, 125 mL/hr, Intravenous, Continuous, Vikram Tay MD, Last Rate: 125 mL/hr at 09/05/19 2306, 125 mL/hr at 09/05/19 2306  •  vitamin D (ERGOCALCIFEROL) capsule 50,000 Units, 50,000 Units, Oral, Q7 Days, Reyes, Rosenberg Acosta, MD  •  acetaminophen  •  magnesium sulfate  •  potassium chloride **OR** potassium chloride **OR** potassium chloride  •  [COMPLETED] Insert peripheral IV **AND** sodium chloride  •  sodium chloride    SOCIAL HISTORY:  She does eat fair.  She does exercise by walking daily.  She smokes 1 packs per day since she was 15 years old but quit in 1996 but has  restarted in 2019 but only smokes occassionaly.  She denies alcohol nor illicit drug abuse.  She has had a high school education.  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  She became a  in 2015.  She lives in a house by herself.  She has a pet dog.  She has good family support.  She is not sexually active.  There is no dysfunction at home.  There are no weapons, violence, nor abuse at home.    FAMILY HISTORY:  Mother and father are unknown since she is adopted.    PSYCHIATRIC HISTORY:  Anxiety and Depression.    REVIEW OF SYSTEM:  None except those stated on the History of Present Illness.    LABORATORY DATA:  Lab Results (last 72 hours)     Procedure Component Value Units Date/Time    POC Glucose Once [[id number]]  (Abnormal) Collected:  09/05/19 2111    Specimen:  Blood Updated:  09/05/19 2217     Glucose 182 mg/dL     D-dimer, Quantitative [[id number]]  (Normal) Collected:  09/05/19 1730    Specimen:  Blood from Hand, Right Updated:  09/05/19 1908     D-Dimer, Quantitative <0.27 MCGFEU/mL     Narrative:       The Stago D-Dimer test used in conjunction with a clinical pretest probability (PTP) assessment model, has been approved by the FDA to rule out the presence of venous thromboembolism (VTE) in outpatients suspected of deep venous thrombosis (DVT) or pulmonary embolism (PE). The cut-off for negative predictive value is <0.50 MCGFEU/mL.    Protime-INR [[id number]]  (Normal) Collected:  09/05/19 1730    Specimen:  Blood from Hand, Right Updated:  09/05/19 1908     Protime 13.3 Seconds      INR 1.04    aPTT [[id number]]  (Normal) Collected:  09/05/19 1730    Specimen:  Blood from Hand, Right Updated:  09/05/19 1908     PTT 25.4 seconds     Fibrinogen [[id number]]  (Normal) Collected:  09/05/19 1730    Specimen:  Blood from Hand, Right Updated:  09/05/19 1908     Fibrinogen 418 mg/dL     Vitamin B12 [[id number]]  (Normal) Collected:  09/05/19 1730    Specimen:  Blood from Hand, Right Updated:  09/05/19 1840     Vitamin B-12 441  pg/mL     Folate [[id number]]  (Normal) Collected:  09/05/19 1730    Specimen:  Blood from Hand, Right Updated:  09/05/19 1840     Folate 19.00 ng/mL     Rheumatoid Factor [[id number]]  (Normal) Collected:  09/05/19 1730    Specimen:  Blood from Hand, Right Updated:  09/05/19 1836     Rheumatoid Factor Quantitative <10.0 IU/mL     BNP [[id number]]  (Normal) Collected:  09/05/19 1730    Specimen:  Blood from Hand, Right Updated:  09/05/19 1831     proBNP 48.9 pg/mL     Narrative:       Among patients with dyspnea, NT-proBNP is highly sensitive for the detection of acute congestive heart failure. In addition NT-proBNP of <300 pg/ml effectively rules out acute congestive heart failure with 99% negative predictive value.    Troponin [[id number]]  (Normal) Collected:  09/05/19 1730    Specimen:  Blood from Hand, Right Updated:  09/05/19 1831     Troponin T <0.010 ng/mL     Narrative:       Troponin T Reference Range:  <= 0.03 ng/mL-   Negative for AMI  >0.03 ng/mL-     Abnormal for myocardial necrosis.  Clinicians would have to utilize clinical acumen, EKG, Troponin and serial changes to determine if it is an Acute Myocardial Infarction or myocardial injury due to an underlying chronic condition.     Ferritin [[id number]]  (Normal) Collected:  09/05/19 1730    Specimen:  Blood from Hand, Right Updated:  09/05/19 1831     Ferritin 36.00 ng/mL     Iron Profile [[id number]]  (Abnormal) Collected:  09/05/19 1730    Specimen:  Blood from Hand, Right Updated:  09/05/19 1822     Iron 56 mcg/dL      Iron Saturation 12 %      Transferrin 316 mg/dL      TIBC 471 mcg/dL     CK [[id number]]  (Normal) Collected:  09/05/19 1730    Specimen:  Blood from Hand, Right Updated:  09/05/19 1822     Creatine Kinase 91 U/L     C-reactive Protein [[id number]]  (Abnormal) Collected:  09/05/19 1730    Specimen:  Blood from Hand, Right Updated:  09/05/19 1822     C-Reactive Protein 0.51 mg/dL     High Sensitivity CRP [[id number]]  (Normal) Collected:   09/05/19 1730    Specimen:  Blood from Hand, Right Updated:  09/05/19 1822     C-Reactive Protein 0.463 mg/dL     Narrative:       Relative Risk Category      Average hs-CRP level    Low                         <0.1 mg/dl  Average                     0.1 mg/dl - 0.3 mg/dl  High                        >0.3 mg/dl    Uric Acid [[id number]]  (Normal) Collected:  09/05/19 1730    Specimen:  Blood from Hand, Right Updated:  09/05/19 1822     Uric Acid 2.7 mg/dL     Sedimentation Rate [[id number]]  (Normal) Collected:  09/05/19 1730    Specimen:  Blood from Hand, Right Updated:  09/05/19 1814     Sed Rate 22 mm/hr     POC Glucose Once [[id number]]  (Abnormal) Collected:  09/05/19 1804    Specimen:  Blood Updated:  09/05/19 1807     Glucose 162 mg/dL     Reticulocytes [[id number]]  (Normal) Collected:  09/05/19 1730    Specimen:  Blood from Hand, Right Updated:  09/05/19 1750     Reticulocyte % 1.33 %      Reticulocyte Absolute 0.0626 10*6/mm3     Insulin, Random [[id number]] Collected:  09/05/19 1730    Specimen:  Blood from Hand, Right Updated:  09/05/19 1745    C-Peptide [[id number]] Collected:  09/05/19 1730    Specimen:  Blood from Hand, Right Updated:  09/05/19 1745    Folate RBC [[id number]] Collected:  09/05/19 1730    Specimen:  Blood from Hand, Right Updated:  09/05/19 1745    RAJ [[id number]] Collected:  09/05/19 1730    Specimen:  Blood from Hand, Right Updated:  09/05/19 1745    Thyroglobulin [[id number]] Collected:  09/05/19 1730    Specimen:  Blood from Hand, Right Updated:  09/05/19 1744    Anti-Thyroglobulin Antibody [[id number]] Collected:  09/05/19 1730    Specimen:  Blood from Hand, Right Updated:  09/05/19 1744    Thyroid Peroxidase Antibody [[id number]] Collected:  09/05/19 1730    Specimen:  Blood from Hand, Right Updated:  09/05/19 1744    Urine Culture - Urine, Urine, Clean Catch [[id number]] Collected:  09/05/19 1123    Specimen:  Urine, Clean Catch Updated:  09/05/19 1305    Procalcitonin [[id number]]   "(Abnormal) Collected:  09/05/19 1031    Specimen:  Blood Updated:  09/05/19 1157     Procalcitonin <0.02 ng/mL     Narrative:       As a Marker for Sepsis (Non-Neonates):   1. <0.5 ng/mL represents a low risk of severe sepsis and/or septic shock.  1. >2 ng/mL represents a high risk of severe sepsis and/or septic shock.    As a Marker for Lower Respiratory Tract Infections that require antibiotic therapy:  PCT on Admission     Antibiotic Therapy             6-12 Hrs later  > 0.5                Strongly Recommended            >0.25 - <0.5         Recommended  0.1 - 0.25           Discouraged                   Remeasure/reassess PCT  <0.1                 Strongly Discouraged          Remeasure/reassess PCT      As 28 day mortality risk marker: \"Change in Procalcitonin Result\" (> 80 % or <=80 %) if Day 0 (or Day 1) and Day 4 values are available. Refer to http://[email or web address]-calculator.com/   Change in PCT <=80 %   A decrease of PCT levels below or equal to 80 % defines a positive change in PCT test result representing a higher risk for 28-day all-cause mortality of patients diagnosed with severe sepsis or septic shock.  Change in PCT > 80 %   A decrease of PCT levels of more than 80 % defines a negative change in PCT result representing a lower risk for 28-day all-cause mortality of patients diagnosed with severe sepsis or septic shock.                Urine Drug Screen - Urine, Clean Catch [[id number]]  (Abnormal) Collected:  09/05/19 1123    Specimen:  Urine, Clean Catch Updated:  09/05/19 1157     Amphet/Methamphet, Screen Negative     Barbiturates Screen, Urine Negative     Benzodiazepine Screen, Urine Positive     Cocaine Screen, Urine Negative     Opiate Screen Negative     THC, Screen, Urine Negative     Methadone Screen, Urine Negative     Oxycodone Screen, Urine Positive    Narrative:       Negative Thresholds For Drugs Screened:     Amphetamines               500 ng/ml   Barbiturates               200 ng/ml   " Benzodiazepines            100 ng/ml   Cocaine                    300 ng/ml   Methadone                  300 ng/ml   Opiates                    300 ng/ml   Oxycodone                  100 ng/ml   THC                        50 ng/ml    The Normal Value for all drugs tested is negative. This report includes final unconfirmed screening results to be used for medical treatment purposes only. Unconfirmed results must not be used for non-medical purposes such as employment or legal testing. Clinical consideration should be applied to any drug of abuse test, particulary when unconfirmed results are used.    Lactic Acid, Plasma [[id number]]  (Normal) Collected:  09/05/19 1130    Specimen:  Blood Updated:  09/05/19 1155     Lactate 1.8 mmol/L     Urinalysis, Microscopic Only - Urine, Clean Catch [[id number]]  (Abnormal) Collected:  09/05/19 1123    Specimen:  Urine, Clean Catch Updated:  09/05/19 1148     RBC, UA 0-2 /HPF      WBC, UA Too Numerous to Count /HPF      Bacteria, UA 4+ /HPF      Squamous Epithelial Cells, UA 0-2 /HPF      Hyaline Casts, UA 0-2 /LPF      Methodology Automated Microscopy    Urinalysis With Microscopic If Indicated (No Culture) - Urine, Clean Catch [[id number]]  (Abnormal) Collected:  09/05/19 1123    Specimen:  Urine, Clean Catch Updated:  09/05/19 1148     Color, UA Yellow     Appearance, UA Cloudy     pH, UA 5.5     Specific Gravity, UA 1.009     Glucose,  mg/dL (1+)     Ketones, UA Negative     Bilirubin, UA Negative     Blood, UA Trace     Protein, UA Negative     Leuk Esterase, UA Large (3+)     Nitrite, UA Negative     Urobilinogen, UA 0.2 E.U./dL    Troponin [[id number]]  (Normal) Collected:  09/05/19 1031    Specimen:  Blood Updated:  09/05/19 1147     Troponin T <0.010 ng/mL     Narrative:       Troponin T Reference Range:  <= 0.03 ng/mL-   Negative for AMI  >0.03 ng/mL-     Abnormal for myocardial necrosis.  Clinicians would have to utilize clinical acumen, EKG, Troponin and serial  changes to determine if it is an Acute Myocardial Infarction or myocardial injury due to an underlying chronic condition.     POC Glucose Once [[id number]]  (Abnormal) Collected:  09/05/19 1140    Specimen:  Blood Updated:  09/05/19 1146     Glucose 173 mg/dL     [facility] [[id number]] Collected:  09/05/19 1031    Specimen:  Blood Updated:  09/05/19 1145    Narrative:       The following orders were created for panel order [facility].  Procedure                               Abnormality         Status                     ---------                               -----------         ------                     Light Blue Top[[id number]]                                   Final result               Green Top (Gel)[[id number]]                                  Final result               Lavender Top[[id number]]                                     Final result               Gold Top - SST[[id number]]                                   Final result                 Please view results for these tests on the individual orders.    Light Blue Top [[id number]] Collected:  09/05/19 1031    Specimen:  Blood Updated:  09/05/19 1145     Extra Tube hold for add-on     Comment: Auto resulted       Green Top (Gel) [[id number]] Collected:  09/05/19 1031    Specimen:  Blood Updated:  09/05/19 1145     Extra Tube Hold for add-ons.     Comment: Auto resulted.       Lavender Top [[id number]] Collected:  09/05/19 1031    Specimen:  Blood Updated:  09/05/19 1145     Extra Tube hold for add-on     Comment: Auto resulted       Gold Top - SST [[id number]] Collected:  09/05/19 1031    Specimen:  Blood Updated:  09/05/19 1145     Extra Tube Hold for add-ons.     Comment: Auto resulted.       Comprehensive Metabolic Panel [[id number]]  (Abnormal) Collected:  09/05/19 1031    Specimen:  Blood Updated:  09/05/19 1142     Glucose 240 mg/dL      BUN 10 mg/dL      Creatinine 0.67 mg/dL      Sodium 135 mmol/L      Potassium 4.0 mmol/L      Chloride 95 mmol/L       CO2 26.7 mmol/L      Calcium 9.1 mg/dL      Total Protein 7.1 g/dL      Albumin 4.30 g/dL      ALT (SGPT) 47 U/L      AST (SGOT) 27 U/L      Alkaline Phosphatase 150 U/L      Total Bilirubin 0.3 mg/dL      eGFR Non African Amer 88 mL/min/1.73      Globulin 2.8 gm/dL      A/G Ratio 1.5 g/dL      BUN/Creatinine Ratio 14.9     Anion Gap 13.3 mmol/L     Narrative:       GFR Normal >60  Chronic Kidney Disease <60  Kidney Failure <15    Magnesium [[id number]]  (Normal) Collected:  09/05/19 1031    Specimen:  Blood Updated:  09/05/19 1142     Magnesium 1.6 mg/dL     Ethanol [[id number]] Collected:  09/05/19 1031    Specimen:  Blood Updated:  09/05/19 1142     Ethanol <10 mg/dL      Ethanol % <0.010 %     Blood Culture - Blood, Arm, Left [[id number]] Collected:  09/05/19 1133    Specimen:  Blood from Arm, Left Updated:  09/05/19 1138    Blood Culture - Blood, Arm, Left [[id number]] Collected:  09/05/19 1130    Specimen:  Blood from Arm, Left Updated:  09/05/19 1137    Protime-INR [[id number]]  (Normal) Collected:  09/05/19 1031    Specimen:  Blood Updated:  09/05/19 1129     Protime 13.2 Seconds      INR 1.03    CBC & Differential [[id number]] Collected:  09/05/19 1031    Specimen:  Blood Updated:  09/05/19 1125    Narrative:       The following orders were created for panel order CBC & Differential.  Procedure                               Abnormality         Status                     ---------                               -----------         ------                     CBC Auto Differential[[id number]]        Abnormal            Final result                 Please view results for these tests on the individual orders.    CBC Auto Differential [[id number]]  (Abnormal) Collected:  09/05/19 1031    Specimen:  Blood Updated:  09/05/19 1125     WBC 6.77 10*3/mm3      RBC 4.67 10*6/mm3      Hemoglobin 13.8 g/dL      Hematocrit 42.2 %      MCV 90.4 fL      MCH 29.6 pg      MCHC 32.7 g/dL      RDW 13.3 %      RDW-SD 43.9 fl      MPV  10.1 fL      Platelets 242 10*3/mm3      Neutrophil % 86.5 %      Lymphocyte % 6.8 %      Monocyte % 5.9 %      Eosinophil % 0.0 %      Basophil % 0.4 %      Immature Grans % 0.4 %      Neutrophils, Absolute 5.85 10*3/mm3      Lymphocytes, Absolute 0.46 10*3/mm3      Monocytes, Absolute 0.40 10*3/mm3      Eosinophils, Absolute 0.00 10*3/mm3      Basophils, Absolute 0.03 10*3/mm3      Immature Grans, Absolute 0.03 10*3/mm3      nRBC 0.0 /100 WBC       RADIOGRAPHIC DATA:    CT ABDOMEN PELVIS WITH CONTRAST:    Radiation dose reduction techniques were utilized, including automated exposure control and exposure modulation based on body size.    CLINICAL:  69-year-old female with abdominal pain.  Previous cholecystectomy.    COMPARISON:  06/16/2019.    FINDINGS:  There is again demonstrated prominence of the biliary tree status post cholecystectomy with distal CBD diameter of 11-12 mm.  There is no distal CBD filling defect.  Liver size, shape and pattern of enhancement within normal limits.  The pancreas is satisfactory in appearance.  The stomach is collapsed, contour within normal limits, the duodenal bulb is typical in appearance.  Spleen is satisfactory in size and shape.  Both adrenal glands are normal.    There is a 3-4 mm nonobstructing right lower pole renal calculus and a small renal cortical or calyceal cyst measuring approximately 7 mm.  Renal cortical pattern of enhancement is symmetric and satisfactory.  There is a 3 mm nonobstructing calculus within the lower pole of the left kidney with 2 tiny renal cortical cysts.  No obstructive uropathy on the right or left.  Ureters are satisfactory in course and caliber to the urinary bladder which demonstrates mild wall thickening similar to the previous examination.  No intraluminal filling defect.  Vaginal cuff is typical in appearance.  Large and small bowel have satisfactory appearance.  There is a circumscribed 2.9 cm cyst within the right pelvis along the  adnexa, having the appearance of a stable ovarian cyst.  Left ovary is normal in appearance.  Patient is status post partial hysterectomy.  There is induration of the subcutaneous fat along the left anterior abdominal wall, probably related to injections and similar to the previous examination.    CONCLUSION:  1. Stable CT scan of the abdomen and pelvis.  There is prominence of the biliary tree similar to the previous examination with the distal CBD diameter of 11-12 mm.  2. Bilateral solitary tiny nonobstructing renal calculi and bilateral renal cysts.  No obstructive uropathy.  3. Stable 2.9 cm right ovarian cyst/cystic mass.  4. Mild bladder wall thickening similar to the previous examination.    Findings of this report called Dr. Tay in the emergency room at the time of completion, 1:05 PM.    This report was finalized on 9/5/2019 1:25 PM by Dr. Pk Dickens M.D.     CHEST AND RIGHT SHOULDER RADIOGRAPH:    HISTORY:  Weakness    TECHNIQUE:  AP chest and AP with internal and external rotation views of the right shoulder    COMPARISON:  Chest radiograph 06/17/2018    FINDINGS AND IMPRESSION:  Mild asymmetric pulmonary opacification within the left lower lobe is grossly unchanged since 06/17/2018.  There is no new pulmonary consolidation.  No pneumothorax or pleural effusion is seen.  Heart is normal in size.    There is no fracture or dislocation involving the right shoulder.    This report was finalized on 9/5/2019 12:31 PM by Dr. Mansoor Rizo M.D.     ADULT TRANSTHORACIC ECHOCARDIOGRAM:    Interpretation Summary     · Left ventricular systolic function is normal. Calculated EF = 61.0%. Estimated EF was in agreement with the calculated EF. Normal left ventricular cavity size and wall thickness noted. All left ventricular wall segments contract normally. Left ventricular diastolic function is normal.     Study Description     A two-dimensional transthoracic echocardiogram with complete color flow and  Doppler was performed. The study is technically adequate for diagnosis.Contrast was not used for this study.   Echocardiogram Findings     Left Ventricle Left ventricular systolic function is normal. Calculated EF = 61.0%. Estimated EF was in agreement with the calculated EF. Normal left ventricular cavity size and wall thickness noted. All left ventricular wall segments contract normally. Left ventricular diastolic function is normal.   Right Ventricle Normal right ventricular cavity size, wall thickness, systolic function and septal motion noted.   Left Atrium Normal left atrial size and volume noted.   Right Atrium Normal right atrial size noted.   Aortic Valve The aortic valve is abnormal in structure. There is mild calcification of the aortic valve.No aortic valve regurgitation is present. No aortic valve stenosis is present.   Mitral Valve The mitral valve is normal in structure. Trace mitral valve regurgitation is present. No significant mitral valve stenosis is present.   Tricuspid Valve The tricuspid valve is grossly normal. Estimated right ventricular systolic pressure from tricuspid regurgitation is normal (<35 mmHg).   Pulmonic Valve The pulmonic valve is grossly normal in structure. There is trace pulmonic valve regurgitation present.   Greater Vessels No dilation of the aortic root is present. The inferior vena cava is normally sized. Normal IVC inspiratory collapse of greater than 50% noted.   Pericardium There is no evidence of pericardial effusion.        DUPLEX CAROTID BILATERAL BLOOD FLOW STUDY:    Interpretation Summary     · Proximal right internal carotid artery mild stenosis.  · Proximal left internal carotid artery moderate stenosis.     Study Findings     • Right CCA Prox: No plaque visualized.   • Right CCA Dist: No plaque visualized.   • Right ICA Prox: Plaque present.   • Right ECA: No plaque visualized.   • Right Vertebral: Antegrade flow noted.       • Left CCA Prox: No plaque  visualized.   • Left CCA Dist: No plaque visualized.   • Left ICA Prox: Plaque present.   • Left ECA: Plaque present.   • Left Vertebral: Antegrade flow noted.   Study Impression     • Right ICA Prox: Imaging indicates 16%-49% stenosis.     • Left ICA Prox: Imaging indicates 50-59% stenosis.     DUPLEX VENOUS LOWER EXTREMITIES BLOOD FLOW STUDIES:    Interpretation Summary     · Normal bilateral lower extremity venous duplex scan.     Study Impression     • Right Common Femoral: No deep vein thrombosis noted.   • Right Saphenofemoral Junction: No superficial thrombophlebitis noted.   • Right Proximal Femoral: No deep vein thrombosis noted.   • Right Mid Femoral: No deep vein thrombosis noted.   • Right Distal Femoral: No deep vein thrombosis noted.   • Right Popliteal: No deep vein thrombosis noted.   • Right Posterior Tibial: No deep vein thrombosis noted.   • Right Peroneal: No deep vein thrombosis noted.   • Right Gastrocnemius Soleal: No deep vein thrombosis noted.   • Right Greater Saphenous Above Knee: No superficial thrombophlebitis noted.   • Right Greater Saphenous Below Knee: No superficial thrombophlebitis noted.   • Left Common Femoral: No deep vein thrombosis noted.   • Left Saphenofemoral Junction: No superficial thrombophlebitis noted.   • Left Proximal Femoral: No deep vein thrombosis noted.   • Left Mid Femoral: No deep vein thrombosis noted.   • Left Distal Femoral: No deep vein thrombosis noted.   • Left Popliteal: No deep vein thrombosis noted.   • Left Posterior Tibial: No deep vein thrombosis noted.   • Left Peroneal: No deep vein thrombosis noted.   • Left Gastrocnemius Soleal: No deep vein thrombosis noted.   • Left Greater Saphenous Above Knee: No superficial thrombophlebitis noted.   • Left Greater Saphenous Below Knee: No superficial thrombophlebitis noted.     BRAIN MRI WITH AND WITHOUT CONTRAST:    HISTORY:  69-year-old woman with episode of confusion yesterday.  Diabetes.    TECHNIQUE:   Brain MRI was performed with and without IV contrast and is correlated with head CT performed yesterday and brain MRI from 10/01/2014.     FINDINGS:  The ventricles are normal in caliber.  There is no restricted diffusion.  Mild patchy high T2 signal in the periventricular white matter is observed and appears more extensive than in 2014.  Brain parenchyma otherwise appears normal.  There is no abnormal contrast enhancement.  Extra-axial spaces and intracranial flow voids appear normal.  Orbital structures, paranasal sinuses and mastoid air cells appear normal.  The bones of the skull and visualized upper cervical spine appear normal.    IMPRESSION:  Small vessel white matter change. Otherwise negative brain MRI.    This report was finalized on 9/6/2019 8:38 PM by Dr. Ayaz Yeh M.D.    VITAL SIGNS;  Temp:  [97.5 °F (36.4 °C)-98.2 °F (36.8 °C)] 97.7 °F (36.5 °C)  Heart Rate:  [64-97] 69  Resp:  [16-18] 16  BP: (131-165)/(64-75) 148/64    Physical Exam  PHYSICAL EXAM:     VITAL SIGNS:  TMax  97.7  ND  69  RR  16  B/P  148/64  BMI  26.3.    GENERAL:  Fairly nourish.  Fairly developed.  Overweight.  Fair Affect.    SKIN:  Not diaphoretic.  Fair skin turgor.  Not jaundice.    HEENMT:  Normocephalic/Atraumatic.  Pinkish palpebral conjunctiva.  Anicteric sclerae.  PERRLA.  EOMI.  Oral mucosal membrane are moist.  Pharynx is not red.    NECK:  Supple.  No JVD.  No bruits.    THORAX AND LUNGS:  Clear to Auscultation.  No rhonchi, wheeze, nor rales.  No tenderness upon deep palpation of the costochondral junction.    CARDIOVASCULAR SYSTEM:  Regular rate and rhythm, no murmur.  Normal S1 / S2.  No S3 / S4.  No heaves.    ABDOMEN:  No Hepatosplenomegaly.  Soft.  Not tender.  Not distended.  Positive  bowel sounds.    EXTREMITIES:  No cyanosis, clubbing, nor edema.  No calf tenderness.    CNS:  Alert.  Oriented x 3.  Cranial nerves are intact.  Sensory / Motor are intact.    Results Review:    I reviewed the patient's new  clinical results.  Discussed with patient and family.      Recurrent urinary tract infection with altered mental status changes (urosepsis) with a history of cerebrovascular accident with left foot drop    Hematuria    Fatty liver    Glucosuria    Hyponatremia    Hyperglycemia    Carotid artery stenosis    Metabolic encephalopathy    DIAGNOSIS:    0)  Recurrent Urinary Tract Infection with Altered Mental Status Changes (Urosepsis) with a History of Cerebrovascular Accident with Left Foot Drop.    1)  Hematuria.    2)  Fatty Liver.    3)  Glucosuria.    4)  Hyponatremia.    5)  Hyperglycemia.    6)  Carotid Artery Stenosis.    7)  Metabolic Encephalopathy.    8)  History of Asthma.    9)  History of Fatty Liver.    1)  History of Hypertension.    1)  History of Osteoarthritis.    1)  History of Hyperlipidemia.    1)  History of Nephrolithiasis.    1)  History of Hypothyroidism.    1)  History of Tobacco Abuse.    1)  History of Myofascial Pain.    1)  History of Allergic Rhinitis.    1)  History of Diabetic Neuropathy.    1)  History of Vitamin D Deficiency.    1)  History of Migraine Headaches.    1)  History of Anxiety and Depression.    1)  History of Recurrent Urinary Tract Infection.    1)  History of Gastroesophageal Reflux Disease.    1)  History of Low Back Pain with Radiculopathy.    1)  History of Uncontrolled Diabetes Mellitus Type 2.    1)  History of Chronic Obstructive Pulmonary Disease.    1)  History of Cerebrovascular Accident with Left Foot Drop.    PLAN:    1)  Admit under Dr. Rosenberg A. Reyes to telemetry floor as observation.    2)  Diagnosis:      3)  Condition:  Fair.    4)  Vital Signs:  Per floor rountine.    5)  Allergies:  NKDA.    6)  Nurse's Orders:  I's and O's with acucheck AC/HS with Sliding Scale with Novolo - 150 = 1 unit, 151 - 200 = 3 units, 201 - 250 = 5 units, 251 - 300 = 7 units,         301 - 350 = 10 units. 351 - 400 = 13 units, 401 - 450 = 16 units, 451 - 500 = 20  units, greater than 500, give 20 units and call MD.    7)  Diet:  1800 Reji ADA Diet.    8)  Activities:  As tolerated.    9)  Labs:  CBC, CMP, A1c, lipid profile in AM.  Cardiac enzymes with troponin I x 3 Q8, thyroid profile, thyroglobulin antibodies, TPO, serotonin, Vitamin D, UA, Mg,         blood cultures x 2 15 minutes apart, C-peptide, insulin, ESR, CRP, RAJ, RF, B12, folate, iron profile, ferritin, TIBC, echocardiogram with 2 D and M mode,          chest x-ray, CT scan of lungs with and without contrast, MRI of the brain, 24-hour Holter monitor, carotid doppler blood flow studies.    10)  IVF:  NS at 80 cc/hour.    11)  Consultations:  None.    12)  Medications:  Continue home medications.    13)  Tylenol 650 mg 1 tab PO Q 4 hours PRN for temperatures equal to and/or greater than 100.4 and for comfort.    14)  Ambien CR 12.5 mg 1 tab PO QHS PRN.    15)  Potassium Protocol if needed.    16)  Lovenox 40 mg SQ Q Daily for prophylaxis.    17)  Xopenex 1.25 mg/3 ml mini nebulizer QID PRN.    18)  Magnesium sulfate 2 gm IV slow drip for Magnesium levels 1.8 or less.    19)  Rocephin 1000 mg IV Q Daily.    20)  Zithromax 500 mg IV Q Daily.    21)  Solu-Medrol 125 mg IV Q 8 hours.    I discussed the patients findings and my recommendations with patient and family.     Rosenberg Acosta Reyes, MD  09/05/19  11:23 PM    Time: More than 50% of time spent in counseling and coordination of care:  Total face-to-face/floor time 45 min.  Time spent in counseling 45 min. Counseling included the following topics: above topics and plan of treatment.

## 2019-09-07 VITALS
OXYGEN SATURATION: 100 % | RESPIRATION RATE: 16 BRPM | BODY MASS INDEX: 25.84 KG/M2 | HEIGHT: 66 IN | HEART RATE: 80 BPM | DIASTOLIC BLOOD PRESSURE: 65 MMHG | WEIGHT: 160.8 LBS | TEMPERATURE: 98.4 F | SYSTOLIC BLOOD PRESSURE: 151 MMHG

## 2019-09-07 PROBLEM — K76.0 FATTY LIVER: Status: RESOLVED | Noted: 2019-09-06 | Resolved: 2019-09-07

## 2019-09-07 PROBLEM — N39.0 RECURRENT URINARY TRACT INFECTION: Status: RESOLVED | Noted: 2019-09-05 | Resolved: 2019-09-07

## 2019-09-07 PROBLEM — G93.41 METABOLIC ENCEPHALOPATHY: Status: RESOLVED | Noted: 2019-09-06 | Resolved: 2019-09-07

## 2019-09-07 PROBLEM — I65.29 CAROTID ARTERY STENOSIS: Status: RESOLVED | Noted: 2019-09-06 | Resolved: 2019-09-07

## 2019-09-07 PROBLEM — E87.1 HYPONATREMIA: Status: RESOLVED | Noted: 2019-09-06 | Resolved: 2019-09-07

## 2019-09-07 PROBLEM — R73.9 HYPERGLYCEMIA: Status: RESOLVED | Noted: 2019-09-06 | Resolved: 2019-09-07

## 2019-09-07 PROBLEM — R81 GLUCOSURIA: Status: RESOLVED | Noted: 2019-09-06 | Resolved: 2019-09-07

## 2019-09-07 PROBLEM — R31.9 HEMATURIA: Status: RESOLVED | Noted: 2019-09-06 | Resolved: 2019-09-07

## 2019-09-07 LAB
ALBUMIN SERPL-MCNC: 3.8 G/DL (ref 3.5–5.2)
ALBUMIN/GLOB SERPL: 1.7 G/DL
ALP SERPL-CCNC: 163 U/L (ref 39–117)
ALT SERPL W P-5'-P-CCNC: 59 U/L (ref 1–33)
ANION GAP SERPL CALCULATED.3IONS-SCNC: 14.3 MMOL/L (ref 5–15)
AST SERPL-CCNC: 30 U/L (ref 1–32)
BACTERIA SPEC AEROBE CULT: ABNORMAL
BASOPHILS # BLD AUTO: 0.03 10*3/MM3 (ref 0–0.2)
BASOPHILS NFR BLD AUTO: 0.6 % (ref 0–1.5)
BILIRUB SERPL-MCNC: 0.2 MG/DL (ref 0.2–1.2)
BUN BLD-MCNC: 8 MG/DL (ref 8–23)
BUN/CREAT SERPL: 15.1 (ref 7–25)
C PEPTIDE SERPL-MCNC: 2.5 NG/ML (ref 1.1–4.4)
CALCIUM SPEC-SCNC: 8.7 MG/DL (ref 8.6–10.5)
CHLORIDE SERPL-SCNC: 100 MMOL/L (ref 98–107)
CO2 SERPL-SCNC: 22.7 MMOL/L (ref 22–29)
CREAT BLD-MCNC: 0.53 MG/DL (ref 0.57–1)
DEPRECATED RDW RBC AUTO: 43.9 FL (ref 37–54)
EOSINOPHIL # BLD AUTO: 0 10*3/MM3 (ref 0–0.4)
EOSINOPHIL NFR BLD AUTO: 0 % (ref 0.3–6.2)
ERYTHROCYTE [DISTWIDTH] IN BLOOD BY AUTOMATED COUNT: 13.6 % (ref 12.3–15.4)
GFR SERPL CREATININE-BSD FRML MDRD: 115 ML/MIN/1.73
GLOBULIN UR ELPH-MCNC: 2.3 GM/DL
GLUCOSE BLD-MCNC: 122 MG/DL (ref 65–99)
GLUCOSE BLDC GLUCOMTR-MCNC: 100 MG/DL (ref 70–130)
GLUCOSE BLDC GLUCOMTR-MCNC: 129 MG/DL (ref 70–130)
GLUCOSE BLDC GLUCOMTR-MCNC: 161 MG/DL (ref 70–130)
GLUCOSE BLDC GLUCOMTR-MCNC: 186 MG/DL (ref 70–130)
GLUCOSE BLDC GLUCOMTR-MCNC: 65 MG/DL (ref 70–130)
HCT VFR BLD AUTO: 37.6 % (ref 34–46.6)
HGB BLD-MCNC: 12.5 G/DL (ref 12–15.9)
IMM GRANULOCYTES # BLD AUTO: 0.02 10*3/MM3 (ref 0–0.05)
IMM GRANULOCYTES NFR BLD AUTO: 0.4 % (ref 0–0.5)
LYMPHOCYTES # BLD AUTO: 0.68 10*3/MM3 (ref 0.7–3.1)
LYMPHOCYTES NFR BLD AUTO: 13.4 % (ref 19.6–45.3)
MAGNESIUM SERPL-MCNC: 2.7 MG/DL (ref 1.6–2.4)
MCH RBC QN AUTO: 29.3 PG (ref 26.6–33)
MCHC RBC AUTO-ENTMCNC: 33.2 G/DL (ref 31.5–35.7)
MCV RBC AUTO: 88.1 FL (ref 79–97)
MONOCYTES # BLD AUTO: 0.59 10*3/MM3 (ref 0.1–0.9)
MONOCYTES NFR BLD AUTO: 11.6 % (ref 5–12)
NEUTROPHILS # BLD AUTO: 3.77 10*3/MM3 (ref 1.7–7)
NEUTROPHILS NFR BLD AUTO: 74 % (ref 42.7–76)
NRBC BLD AUTO-RTO: 0 /100 WBC (ref 0–0.2)
PLATELET # BLD AUTO: 175 10*3/MM3 (ref 140–450)
PMV BLD AUTO: 10.5 FL (ref 6–12)
POTASSIUM BLD-SCNC: 3.2 MMOL/L (ref 3.5–5.2)
PROT SERPL-MCNC: 6.1 G/DL (ref 6–8.5)
RBC # BLD AUTO: 4.27 10*6/MM3 (ref 3.77–5.28)
RBC MORPH BLD: NORMAL
SMALL PLATELETS BLD QL SMEAR: ADEQUATE
SODIUM BLD-SCNC: 137 MMOL/L (ref 136–145)
THYROPEROXIDASE AB SERPL-ACNC: 14 IU/ML (ref 0–34)
WBC MORPH BLD: NORMAL
WBC NRBC COR # BLD: 5.09 10*3/MM3 (ref 3.4–10.8)

## 2019-09-07 PROCEDURE — 83735 ASSAY OF MAGNESIUM: CPT | Performed by: FAMILY MEDICINE

## 2019-09-07 PROCEDURE — 85025 COMPLETE CBC W/AUTO DIFF WBC: CPT | Performed by: FAMILY MEDICINE

## 2019-09-07 PROCEDURE — G0378 HOSPITAL OBSERVATION PER HR: HCPCS

## 2019-09-07 PROCEDURE — 96372 THER/PROPH/DIAG INJ SC/IM: CPT

## 2019-09-07 PROCEDURE — 85007 BL SMEAR W/DIFF WBC COUNT: CPT | Performed by: FAMILY MEDICINE

## 2019-09-07 PROCEDURE — 96361 HYDRATE IV INFUSION ADD-ON: CPT

## 2019-09-07 PROCEDURE — 25010000003 CEFTRIAXONE PER 250 MG: Performed by: FAMILY MEDICINE

## 2019-09-07 PROCEDURE — 80053 COMPREHEN METABOLIC PANEL: CPT | Performed by: FAMILY MEDICINE

## 2019-09-07 PROCEDURE — 63710000001 INSULIN LISPRO (HUMAN) PER 5 UNITS: Performed by: FAMILY MEDICINE

## 2019-09-07 PROCEDURE — 82962 GLUCOSE BLOOD TEST: CPT

## 2019-09-07 PROCEDURE — 63710000001 INSULIN GLARGINE PER 5 UNITS: Performed by: FAMILY MEDICINE

## 2019-09-07 PROCEDURE — 25010000002 ENOXAPARIN PER 10 MG: Performed by: FAMILY MEDICINE

## 2019-09-07 RX ORDER — ROSUVASTATIN CALCIUM 20 MG/1
20 TABLET, COATED ORAL NIGHTLY
Qty: 30 TABLET | Refills: 12 | Status: SHIPPED | OUTPATIENT
Start: 2019-09-07 | End: 2020-01-01 | Stop reason: DRUGHIGH

## 2019-09-07 RX ORDER — LEVOTHYROXINE SODIUM 0.05 MG/1
50 TABLET ORAL DAILY
Qty: 30 TABLET | Refills: 12 | Status: SHIPPED | OUTPATIENT
Start: 2019-09-07

## 2019-09-07 RX ORDER — VALSARTAN 160 MG/1
160 TABLET ORAL
Qty: 30 TABLET | Refills: 12 | Status: SHIPPED | OUTPATIENT
Start: 2019-09-08

## 2019-09-07 RX ORDER — FENOFIBRATE 145 MG/1
145 TABLET, COATED ORAL DAILY
Status: DISCONTINUED | OUTPATIENT
Start: 2019-09-07 | End: 2019-09-07 | Stop reason: HOSPADM

## 2019-09-07 RX ORDER — FLUTICASONE PROPIONATE 50 MCG
2 SPRAY, SUSPENSION (ML) NASAL DAILY
Qty: 3 BOTTLE | Refills: 5 | Status: SHIPPED | OUTPATIENT
Start: 2019-09-07 | End: 2020-01-01 | Stop reason: HOSPADM

## 2019-09-07 RX ORDER — CEFDINIR 300 MG/1
600 CAPSULE ORAL DAILY
Qty: 20 CAPSULE | Refills: 3 | Status: SHIPPED | OUTPATIENT
Start: 2019-09-07 | End: 2020-01-01

## 2019-09-07 RX ORDER — PANTOPRAZOLE SODIUM 40 MG/1
40 TABLET, DELAYED RELEASE ORAL DAILY
Qty: 30 TABLET | Refills: 12 | Status: SHIPPED | OUTPATIENT
Start: 2019-09-07 | End: 2020-01-01 | Stop reason: ALTCHOICE

## 2019-09-07 RX ORDER — FENOFIBRATE 145 MG/1
145 TABLET, COATED ORAL DAILY
Qty: 30 TABLET | Refills: 12 | Status: SHIPPED | OUTPATIENT
Start: 2019-09-08

## 2019-09-07 RX ORDER — ERGOCALCIFEROL 1.25 MG/1
50000 CAPSULE ORAL
Qty: 12 CAPSULE | Refills: 4 | Status: SHIPPED | OUTPATIENT
Start: 2019-09-07

## 2019-09-07 RX ORDER — AMLODIPINE BESYLATE 10 MG/1
10 TABLET ORAL DAILY
Qty: 30 TABLET | Refills: 12 | Status: SHIPPED | OUTPATIENT
Start: 2019-09-08

## 2019-09-07 RX ORDER — AMLODIPINE BESYLATE 10 MG/1
10 TABLET ORAL DAILY
Status: DISCONTINUED | OUTPATIENT
Start: 2019-09-08 | End: 2019-09-07 | Stop reason: HOSPADM

## 2019-09-07 RX ADMIN — ENOXAPARIN SODIUM 40 MG: 40 INJECTION SUBCUTANEOUS at 16:02

## 2019-09-07 RX ADMIN — OXYCODONE HYDROCHLORIDE AND ACETAMINOPHEN 1 TABLET: 7.5; 325 TABLET ORAL at 02:53

## 2019-09-07 RX ADMIN — POTASSIUM CHLORIDE 40 MEQ: 750 CAPSULE, EXTENDED RELEASE ORAL at 16:02

## 2019-09-07 RX ADMIN — INSULIN LISPRO 3 UNITS: 100 INJECTION, SOLUTION INTRAVENOUS; SUBCUTANEOUS at 17:28

## 2019-09-07 RX ADMIN — ASPIRIN 81 MG: 81 TABLET, COATED ORAL at 08:56

## 2019-09-07 RX ADMIN — ROSUVASTATIN CALCIUM 20 MG: 20 TABLET, FILM COATED ORAL at 00:43

## 2019-09-07 RX ADMIN — VALSARTAN 160 MG: 160 TABLET, FILM COATED ORAL at 08:56

## 2019-09-07 RX ADMIN — INSULIN GLARGINE 36 UNITS: 100 INJECTION, SOLUTION SUBCUTANEOUS at 08:59

## 2019-09-07 RX ADMIN — OXYCODONE HYDROCHLORIDE AND ACETAMINOPHEN 1 TABLET: 7.5; 325 TABLET ORAL at 15:17

## 2019-09-07 RX ADMIN — Medication 1 TABLET: at 08:56

## 2019-09-07 RX ADMIN — FLUTICASONE PROPIONATE 2 SPRAY: 50 SPRAY, METERED NASAL at 08:59

## 2019-09-07 RX ADMIN — SODIUM CHLORIDE 80 ML/HR: 9 INJECTION, SOLUTION INTRAVENOUS at 00:06

## 2019-09-07 RX ADMIN — PIOGLITAZONE 30 MG: 30 TABLET ORAL at 08:56

## 2019-09-07 RX ADMIN — PROGESTERONE 200 MG: 200 CAPSULE ORAL at 00:42

## 2019-09-07 RX ADMIN — CEFTRIAXONE SODIUM 2 G: 2 INJECTION, SOLUTION INTRAVENOUS at 12:27

## 2019-09-07 RX ADMIN — AMLODIPINE BESYLATE 5 MG: 5 TABLET ORAL at 08:56

## 2019-09-07 RX ADMIN — METFORMIN HYDROCHLORIDE 1000 MG: 1000 TABLET ORAL at 17:28

## 2019-09-07 RX ADMIN — LINAGLIPTIN 5 MG: 5 TABLET, FILM COATED ORAL at 08:56

## 2019-09-07 RX ADMIN — POTASSIUM CHLORIDE 40 MEQ: 750 CAPSULE, EXTENDED RELEASE ORAL at 07:32

## 2019-09-07 RX ADMIN — LEVOTHYROXINE SODIUM 50 MCG: 50 TABLET ORAL at 07:32

## 2019-09-07 RX ADMIN — INSULIN LISPRO 3 UNITS: 100 INJECTION, SOLUTION INTRAVENOUS; SUBCUTANEOUS at 08:57

## 2019-09-07 RX ADMIN — PANTOPRAZOLE SODIUM 40 MG: 40 TABLET, DELAYED RELEASE ORAL at 07:35

## 2019-09-07 RX ADMIN — ALPRAZOLAM 1 MG: 0.5 TABLET ORAL at 00:42

## 2019-09-07 RX ADMIN — Medication 1000 UNITS: at 08:55

## 2019-09-07 RX ADMIN — ESCITALOPRAM 10 MG: 10 TABLET, FILM COATED ORAL at 08:56

## 2019-09-07 RX ADMIN — FENOFIBRATE 145 MG: 145 TABLET ORAL at 08:56

## 2019-09-07 NOTE — PLAN OF CARE
Problem: Patient Care Overview  Goal: Plan of Care Review  Outcome: Ongoing (interventions implemented as appropriate)   09/07/19 0611   Coping/Psychosocial   Plan of Care Reviewed With patient   Plan of Care Review   Progress improving   OTHER   Outcome Summary RESTED WELL IN BETWEEN PAIN PILL AND XANAX. BLOOD SUGAR DID DROP DURING NIGHT AND THEN BACK UP AFTER CARBOHYDRATE REPLACEMENT.       Problem: Fall Risk (Adult)  Goal: Absence of Fall  Outcome: Ongoing (interventions implemented as appropriate)      Problem: Pain, Chronic (Adult)  Goal: Acceptable Pain/Comfort Level and Functional Ability  Outcome: Ongoing (interventions implemented as appropriate)      Problem: Urinary Tract Infection (Adult)  Goal: Signs and Symptoms of Listed Potential Problems Will be Absent, Minimized or Managed (Urinary Tract Infection)  Outcome: Ongoing (interventions implemented as appropriate)

## 2019-09-07 NOTE — DISCHARGE SUMMARY
Since the patient has an only 2 day stay and all is stated in the H&P, the discharge summary is the same as the H&P.

## 2019-09-07 NOTE — PROGRESS NOTES
Marlyn Rosario  69 y.o.  1950  [id number]  [id number]  09/06/19     LOS: 0 days   Patient Care Team:  Reyes, Rosenberg Acosta, MD as PCP - General (Family Medicine)  Addie Renee as PCP - Claims Attributed    Chief Complaint   Patient presents with   • Shoulder Pain   • Fatigue     Subjective   She states that she does feel better and does not feel confuse.  She is not nauseated nor vomiting.  She has no fever nor chills.    Objective     LABORATORY DATA:  Lab Results (last 24 hours)     Procedure Component Value Units Date/Time    POC Glucose Once [[id number]]  (Abnormal) Collected:  09/06/19 2027    Specimen:  Blood Updated:  09/06/19 2029     Glucose 246 mg/dL     POC Glucose Once [[id number]]  (Normal) Collected:  09/06/19 1603    Specimen:  Blood Updated:  09/06/19 1605     Glucose 113 mg/dL     Urine Culture - Urine, Urine, Clean Catch [[id number]]  (Abnormal) Collected:  09/05/19 1123    Specimen:  Urine, Clean Catch Updated:  09/06/19 1249     Urine Culture >100,000 CFU/mL Gram Negative Bacilli    Blood Culture - Blood, Arm, Left [[id number]] Collected:  09/05/19 1133    Specimen:  Blood from Arm, Left Updated:  09/06/19 1145     Blood Culture No growth at 24 hours    Blood Culture - Blood, Arm, Left [[id number]] Collected:  09/05/19 1130    Specimen:  Blood from Arm, Left Updated:  09/06/19 1145     Blood Culture No growth at 24 hours    POC Glucose Once [[id number]]  (Abnormal) Collected:  09/06/19 1115    Specimen:  Blood Updated:  09/06/19 1119     Glucose 213 mg/dL     LDL Cholesterol, Direct [[id number]]  (Abnormal) Collected:  09/06/19 0347    Specimen:  Blood from Hand, Right Updated:  09/06/19 0652     LDL Cholesterol  177 mg/dL     Narrative:       LDL Reference Ranges    (U.S. Department of Health and Human Services ATP III Classifications)    Optimal          <100 mg/dl  Near Optimal     100-129 mg/dl  Borderline High  130-159 mg/dl  High             160-189 mg/dl  Very High        >189  mg/dl    POC Glucose Once [[id number]]  (Abnormal) Collected:  09/06/19 0628    Specimen:  Blood Updated:  09/06/19 0634     Glucose 228 mg/dL     Comprehensive Metabolic Panel [[id number]]  (Abnormal) Collected:  09/06/19 0347    Specimen:  Blood from Hand, Right Updated:  09/06/19 0502     Glucose 168 mg/dL      BUN 7 mg/dL      Creatinine 0.55 mg/dL      Sodium 131 mmol/L      Potassium 3.9 mmol/L      Chloride 94 mmol/L      CO2 25.5 mmol/L      Calcium 8.6 mg/dL      Total Protein 6.6 g/dL      Albumin 4.10 g/dL      ALT (SGPT) 98 U/L      AST (SGOT) 137 U/L      Alkaline Phosphatase 202 U/L      Total Bilirubin 0.4 mg/dL      eGFR Non African Amer 110 mL/min/1.73      Globulin 2.5 gm/dL      A/G Ratio 1.6 g/dL      BUN/Creatinine Ratio 12.7     Anion Gap 11.5 mmol/L     Narrative:       GFR Normal >60  Chronic Kidney Disease <60  Kidney Failure <15    Amylase [[id number]]  (Normal) Collected:  09/06/19 0347    Specimen:  Blood from Hand, Right Updated:  09/06/19 0502     Amylase 44 U/L     Lipase [[id number]]  (Abnormal) Collected:  09/06/19 0347    Specimen:  Blood from Hand, Right Updated:  09/06/19 0502     Lipase 92 U/L     Lipid Panel [[id number]]  (Abnormal) Collected:  09/06/19 0347    Specimen:  Blood from Hand, Right Updated:  09/06/19 0502     Total Cholesterol 244 mg/dL      Triglycerides 403 mg/dL      HDL Cholesterol 32 mg/dL      LDL Cholesterol  --     Comment: Unable to calculate        VLDL Cholesterol --     Comment: Unable to calculate        LDL/HDL Ratio --     Comment: Unable to calculate       Narrative:       Cholesterol Reference Ranges  (U.S. Department of Health and Human Services ATP III Classifications)    Desirable          <200 mg/dL  Borderline High    200-239 mg/dL  High Risk          >240 mg/dL      Triglyceride Reference Ranges  (U.S. Department of Health and Human Services ATP III Classifications)    Normal           <150 mg/dL  Borderline High  150-199 mg/dL  High              200-499 mg/dL  Very High        >500 mg/dL    HDL Reference Ranges  (U.S. Department of Health and Human Services ATP III Classifcations)    Low     <40 mg/dl (major risk factor for CHD)  High    >60 mg/dl ('negative' risk factor for CHD)        LDL Reference Ranges  (U.S. Department of Health and Human Services ATP III Classifcations)    Optimal          <100 mg/dL  Near Optimal     100-129 mg/dL  Borderline High  130-159 mg/dL  High             160-189 mg/dL  Very High        >189 mg/dL    Troponin [[id number]]  (Normal) Collected:  09/06/19 0347    Specimen:  Blood from Hand, Right Updated:  09/06/19 0502     Troponin T <0.010 ng/mL     Narrative:       Troponin T Reference Range:  <= 0.03 ng/mL-   Negative for AMI  >0.03 ng/mL-     Abnormal for myocardial necrosis.  Clinicians would have to utilize clinical acumen, EKG, Troponin and serial changes to determine if it is an Acute Myocardial Infarction or myocardial injury due to an underlying chronic condition.     Magnesium [[id number]]  (Normal) Collected:  09/06/19 0347    Specimen:  Blood from Hand, Right Updated:  09/06/19 0501     Magnesium 1.9 mg/dL     T4, Free [[id number]]  (Normal) Collected:  09/06/19 0347    Specimen:  Blood from Hand, Right Updated:  09/06/19 0501     Free T4 1.11 ng/dL     BNP [[id number]]  (Normal) Collected:  09/06/19 0347    Specimen:  Blood from Hand, Right Updated:  09/06/19 0501     proBNP 104.8 pg/mL     Narrative:       Among patients with dyspnea, NT-proBNP is highly sensitive for the detection of acute congestive heart failure. In addition NT-proBNP of <300 pg/ml effectively rules out acute congestive heart failure with 99% negative predictive value.    TSH [[id number]]  (Abnormal) Collected:  09/06/19 0347    Specimen:  Blood from Hand, Right Updated:  09/06/19 0501     TSH 5.250 uIU/mL     Hemoglobin A1c [[id number]]  (Abnormal) Collected:  09/06/19 0347    Specimen:  Blood from Hand, Right Updated:  09/06/19 0459      Hemoglobin A1C 12.00 %     Narrative:       Hemoglobin A1C Ranges:    Increased Risk for Diabetes  5.7% to 6.4%  Diabetes                     >= 6.5%  Diabetic Goal                < 7.0%    CBC & Differential [[id number]] Collected:  09/06/19 0347    Specimen:  Blood Updated:  09/06/19 0412    Narrative:       The following orders were created for panel order CBC & Differential.  Procedure                               Abnormality         Status                     ---------                               -----------         ------                     CBC Auto Differential[[id number]]        Abnormal            Final result                 Please view results for these tests on the individual orders.    CBC Auto Differential [[id number]]  (Abnormal) Collected:  09/06/19 0347    Specimen:  Blood from Hand, Right Updated:  09/06/19 0412     WBC 4.99 10*3/mm3      RBC 4.28 10*6/mm3      Hemoglobin 12.5 g/dL      Hematocrit 37.4 %      MCV 87.4 fL      MCH 29.2 pg      MCHC 33.4 g/dL      RDW 13.3 %      RDW-SD 42.4 fl      MPV 10.4 fL      Platelets 165 10*3/mm3      Neutrophil % 76.2 %      Lymphocyte % 14.0 %      Monocyte % 8.8 %      Eosinophil % 0.0 %      Basophil % 0.6 %      Immature Grans % 0.4 %      Neutrophils, Absolute 3.80 10*3/mm3      Lymphocytes, Absolute 0.70 10*3/mm3      Monocytes, Absolute 0.44 10*3/mm3      Eosinophils, Absolute 0.00 10*3/mm3      Basophils, Absolute 0.03 10*3/mm3      Immature Grans, Absolute 0.02 10*3/mm3      nRBC 0.0 /100 WBC     Blood Gas, Arterial [[id number]]  (Abnormal) Collected:  09/06/19 0255    Specimen:  Arterial Blood Updated:  09/06/19 0304     Site Arterial: right brachial     Erasmo's Test N/A     pH, Arterial 7.395 pH units      pCO2, Arterial 45.8 mm Hg      pO2, Arterial 87.8 mm Hg      HCO3, Arterial 28.1 mmol/L      Base Excess, Arterial 2.6 mmol/L      O2 Saturation Calculated 96.6 %      Barometric Pressure for Blood Gas 747.0 mmHg      Modality Room Air     Set  [facility] Resp Rate 18     Rate 18 Breaths/minute       RADIOGRAPHIC DATA:    See Previous Notes.    Vital Signs  Temp:  [97.5 °F (36.4 °C)-97.7 °F (36.5 °C)] 97.5 °F (36.4 °C)  Heart Rate:  [66-67] 67  Resp:  [16-18] 18  BP: (159-174)/(75-84) 174/84    Physical Exam  PHYSICAL EXAM:    VITAL SIGNS:  T Max  97.5  UT  67  RR  18  B/P  174/84  BMI  26.3.    GENERAL:  Fairly nourish.  Fairly developed.  Overweight.  Fair Affect.    SKIN:  Not diaphoretic.  Fair skin turgor.  Not jaundice.    HEENMT:  Normocephalic/Atraumatic.  Pinkish palpebral conjunctiva.  Anicteric sclerae.  PERRLA.  EOMI.  Oral mucosal membrane are moist.  Pharynx is not red.    NECK:  Supple.  No JVD.  No bruits.    THORAX AND LUNGS:  Clear to Auscultation.  No rhonchi, wheeze, nor rales.  No tenderness upon deep palpation of the costochondral junction.    CARDIOVASCULAR SYSTEM:  Regular rate and rhythm, no murmur.  Normal S1 / S2.  No S3 / S4.  No heaves.    ABDOMEN:  No Hepatosplenomegaly.  Soft.  Not tender.  Not distended.  Positive  bowel sounds.    EXTREMITIES:  No cyanosis, clubbing, nor edema.  No calf tenderness.    CNS:  Alert.  Oriented x 3.  Cranial nerves are intact.  Sensory / Motor are intact.    Results Review:     I reviewed the patient's new clinical results.  Discussed with patient and family.    Assessment/Plan       0)  Recurrent Urinary Tract Infection with Altered Mental Status Changes (Urosepsis) with a History of Cerebrovascular Accident with Left Foot Drop.    1)  Hematuria.    2)  Fatty Liver.    3)  Glucosuria.    4)  Hyponatremia.    5)  Hyperglycemia.    6)  Carotid Artery Stenosis.    7)  Metabolic Encephalopathy.    8)  History of Asthma.    9)  History of Fatty Liver.    10)  History of Hypertension.    11)  History of Osteoarthritis.    12)  History of Hyperlipidemia.    13)  History of Nephrolithiasis.    14)  History of Hypothyroidism.    15)  History of Tobacco Abuse.    16)  History of Myofascial Pain.    17)   History of Allergic Rhinitis.    18)  History of Diabetic Neuropathy.    19)  History of Vitamin D Deficiency.    20)  History of Migraine Headaches.    21)  History of Anxiety and Depression.    22)  History of Recurrent Urinary Tract Infection.    23)  History of Gastroesophageal Reflux Disease.    24)  History of Low Back Pain with Radiculopathy.    25)  History of Uncontrolled Diabetes Mellitus Type 2.    26)  History of Chronic Obstructive Pulmonary Disease.    27)  History of Cerebrovascular Accident with Left Foot Drop.    PLAN:  I will increase her Tresiba and start her on Jardiance and Tricor.      Recurrent urinary tract infection with altered mental status changes (urosepsis) with a history of cerebrovascular accident with left foot drop    Hematuria    Fatty liver    Glucosuria    Hyponatremia    Hyperglycemia    Carotid artery stenosis    Metabolic encephalopathy    Rosenberg Acosta Reyes, MD  09/06/19  11:56 PM

## 2019-09-07 NOTE — PROGRESS NOTES
Marlyn Rosario  69 y.o.  1950  [id number]  [id number]  09/07/19     LOS: 0 days   Patient Care Team:  Reyes, Rosenberg Acosta, MD as PCP - General (Family Medicine)  Addie Renee as PCP - Claims Attributed    Chief Complaint   Patient presents with   • Shoulder Pain   • Fatigue     Subjective   She feels great and is ready to go home.  She is no longer confuse and her blood sugars has improved.  We had a long and lengthy discussion about her health and well being and her medications.  She states that she taylor understand.    Objective     LABORATORY DATA:  Lab Results (last 24 hours)     Procedure Component Value Units Date/Time    Blood Culture - Blood, Arm, Left [[id number]] Collected:  09/05/19 1133    Specimen:  Blood from Arm, Left Updated:  09/07/19 1145     Blood Culture No growth at 2 days    Blood Culture - Blood, Arm, Left [[id number]] Collected:  09/05/19 1130    Specimen:  Blood from Arm, Left Updated:  09/07/19 1145     Blood Culture No growth at 2 days    POC Glucose Once [[id number]]  (Normal) Collected:  09/07/19 1119    Specimen:  Blood Updated:  09/07/19 1121     Glucose 100 mg/dL     Thyroid Peroxidase Antibody [[id number]] Collected:  09/05/19 1730    Specimen:  Blood from Hand, Right Updated:  09/07/19 0708     Thyroid Peroxidase Antibody 14 IU/mL     Narrative:       Performed at:  [address] [place], [place] [id number]  : Fernando Bass PhD, Phone:  [phone]    POC Glucose Once [[id number]]  (Abnormal) Collected:  09/07/19 0626    Specimen:  Blood Updated:  09/07/19 0627     Glucose 161 mg/dL     CBC & Differential [[id number]] Collected:  09/07/19 0507    Specimen:  Blood Updated:  09/07/19 0618    Narrative:       The following orders were created for panel order CBC & Differential.  Procedure                               Abnormality         Status                     ---------                               -----------         ------                      CBC Auto Differential[[id number]]        Abnormal            Final result                 Please view results for these tests on the individual orders.    CBC Auto Differential [[id number]]  (Abnormal) Collected:  09/07/19 0507    Specimen:  Blood Updated:  09/07/19 0618     WBC 5.09 10*3/mm3      RBC 4.27 10*6/mm3      Hemoglobin 12.5 g/dL      Hematocrit 37.6 %      MCV 88.1 fL      MCH 29.3 pg      MCHC 33.2 g/dL      RDW 13.6 %      RDW-SD 43.9 fl      MPV 10.5 fL      Platelets 175 10*3/mm3      Neutrophil % 74.0 %      Lymphocyte % 13.4 %      Monocyte % 11.6 %      Eosinophil % 0.0 %      Basophil % 0.6 %      Immature Grans % 0.4 %      Neutrophils, Absolute 3.77 10*3/mm3      Lymphocytes, Absolute 0.68 10*3/mm3      Monocytes, Absolute 0.59 10*3/mm3      Eosinophils, Absolute 0.00 10*3/mm3      Basophils, Absolute 0.03 10*3/mm3      Immature Grans, Absolute 0.02 10*3/mm3      nRBC 0.0 /100 WBC     Scan Slide [[id number]] Collected:  09/07/19 0507    Specimen:  Blood Updated:  09/07/19 0618     RBC Morphology Normal     WBC Morphology Normal     Platelet Estimate Adequate     Comment: NO PLT CLUMPS OBSERVED        Magnesium [[id number]]  (Abnormal) Collected:  09/07/19 0507    Specimen:  Blood Updated:  09/07/19 0612     Magnesium 2.7 mg/dL     Comprehensive Metabolic Panel [[id number]]  (Abnormal) Collected:  09/07/19 0507    Specimen:  Blood Updated:  09/07/19 0609     Glucose 122 mg/dL      BUN 8 mg/dL      Creatinine 0.53 mg/dL      Sodium 137 mmol/L      Potassium 3.2 mmol/L      Chloride 100 mmol/L      CO2 22.7 mmol/L      Calcium 8.7 mg/dL      Total Protein 6.1 g/dL      Albumin 3.80 g/dL      ALT (SGPT) 59 U/L      AST (SGOT) 30 U/L      Alkaline Phosphatase 163 U/L      Total Bilirubin 0.2 mg/dL      eGFR Non African Amer 115 mL/min/1.73      Globulin 2.3 gm/dL      A/G Ratio 1.7 g/dL      BUN/Creatinine Ratio 15.1     Anion Gap 14.3 mmol/L     Narrative:       GFR Normal >60  Chronic Kidney  Disease <60  Kidney Failure <15    Urine Culture - Urine, Urine, Clean Catch [[id number]]  (Abnormal)  (Susceptibility) Collected:  09/05/19 1123    Specimen:  Urine, Clean Catch Updated:  09/07/19 0336     Urine Culture >100,000 CFU/mL Klebsiella pneumoniae ssp pneumoniae    Susceptibility      Klebsiella pneumoniae ssp pneumoniae     HOLLY     Ampicillin Resistant     Ampicillin + Sulbactam Susceptible     Cefazolin Susceptible     Cefepime Susceptible     Ceftazidime Susceptible     Ceftriaxone Susceptible     Gentamicin Susceptible     Levofloxacin Susceptible     Nitrofurantoin Resistant     Piperacillin + Tazobactam Susceptible     Tetracycline Susceptible     Trimethoprim + Sulfamethoxazole Susceptible                    POC Glucose Once [[id number]]  (Normal) Collected:  09/07/19 0321    Specimen:  Blood Updated:  09/07/19 0321     Glucose 129 mg/dL     POC Glucose Once [[id number]]  (Abnormal) Collected:  09/07/19 0250    Specimen:  Blood Updated:  09/07/19 0251     Glucose 65 mg/dL     POC Glucose Once [[id number]]  (Normal) Collected:  09/06/19 2359    Specimen:  Blood Updated:  09/06/19 2359     Glucose 80 mg/dL     POC Glucose Once [[id number]]  (Abnormal) Collected:  09/06/19 2027    Specimen:  Blood Updated:  09/06/19 2029     Glucose 246 mg/dL     POC Glucose Once [[id number]]  (Normal) Collected:  09/06/19 1603    Specimen:  Blood Updated:  09/06/19 1605     Glucose 113 mg/dL       RADIOGRAPHIC DATA:    See Previous Notes.    Vital Signs  Temp:  [97.4 °F (36.3 °C)-98.1 °F (36.7 °C)] 98.1 °F (36.7 °C)  Heart Rate:  [67-90] 90  Resp:  [16-18] 16  BP: (122-174)/(49-84) 150/68    Physical Exam  PHYSICAL EXAM:    VITAL SIGNS:  T Max  98.1  MI  90  RR  16  B/P  150/68  BMI  26.3.    GENERAL:  Fairly nourish.  Fairly developed.  Overweight.  Fair Affect.    SKIN:  Not diaphoretic.  Fair skin turgor.  Not jaundice.    HEENMT:  Normocephalic/Atraumatic.  Pinkish palpebral conjunctiva.  Anicteric sclerae.  PERRLA.   EOMI.  Oral mucosal membrane are moist.  Pharynx is not red.    NECK:  Supple.  No JVD.  No bruits.    THORAX AND LUNGS:  Clear to Auscultation.  No rhonchi, wheeze, nor rales.  No tenderness upon deep palpation of the costochondral junction.    CARDIOVASCULAR SYSTEM:  Regular rate and rhythm, no murmur.  Normal S1 / S2.  No S3 / S4.  No heaves.    ABDOMEN:  No Hepatosplenomegaly.  Soft.  Not tender.  Not distended.  Positive  bowel sounds.    EXTREMITIES:  No cyanosis, clubbing, nor edema.  No calf tenderness.    CNS:  Alert.  Oriented x 3.  Cranial nerves are intact.  Sensory / Motor are intact.    Results Review:     I reviewed the patient's new clinical results.  Discussed with patient.    Assessment/Plan       0)  Recurrent Urinary Tract Infection with Altered Mental Status Changes (Urosepsis) with a History of Cerebrovascular Accident with Left Foot Drop.    1)  Hematuria.    2)  Fatty Liver.    3)  Glucosuria.    4)  Hyponatremia.    5)  Hyperglycemia.    6)  Carotid Artery Stenosis.    7)  Metabolic Encephalopathy.    8)  History of Asthma.    9)  History of Fatty Liver.    10)  History of Hypertension.    11)  History of Osteoarthritis.    12)  History of Hyperlipidemia.    13)  History of Nephrolithiasis.    14)  History of Hypothyroidism.    15)  History of Tobacco Abuse.    16)  History of Myofascial Pain.    17)  History of Allergic Rhinitis.    18)  History of Diabetic Neuropathy.    19)  History of Vitamin D Deficiency.    20)  History of Migraine Headaches.    21)  History of Anxiety and Depression.    22)  History of Recurrent Urinary Tract Infection.    23)  History of Gastroesophageal Reflux Disease.    24)  History of Low Back Pain with Radiculopathy.    25)  History of Uncontrolled Diabetes Mellitus Type 2.    26)  History of Chronic Obstructive Pulmonary Disease.    27)  History of Cerebrovascular Accident with Left Foot Drop.    PLAN:  Discharge home today.      Recurrent urinary tract  infection with altered mental status changes (urosepsis) with a history of cerebrovascular accident with left foot drop    Hematuria    Fatty liver    Glucosuria    Hyponatremia    Hyperglycemia    Carotid artery stenosis    Metabolic encephalopathy    Rosenberg Acosta Reyes, MD  09/07/19  11:51 AM

## 2019-09-07 NOTE — SIGNIFICANT NOTE
AT 0250 FELT LIKE BLOOD SUGAR LOW, WAS 65. REGULAR PEPSI GIVEN AS REQUESTED. RECHECK OF BLOOD SUGAR 129 AT 0321.

## 2019-09-08 ENCOUNTER — READMISSION MANAGEMENT (OUTPATIENT)
Dept: CALL CENTER | Facility: HOSPITAL | Age: 69
End: 2019-09-08

## 2019-09-08 NOTE — OUTREACH NOTE
Prep Survey      Responses   Facility patient discharged from?  [facility]   Is patient eligible?  Yes   Discharge diagnosis  UTI with altered mental status,  Urosepsis,     Does the patient have one of the following disease processes/diagnoses(primary or secondary)?  Sepsis   Does the patient have Home health ordered?  No   Is there a DME ordered?  No   Prep survey completed?  Yes          Irene Fischer RN

## 2019-09-09 ENCOUNTER — READMISSION MANAGEMENT (OUTPATIENT)
Dept: CALL CENTER | Facility: HOSPITAL | Age: 69
End: 2019-09-09

## 2019-09-09 LAB
ABO + RH BLD: NORMAL
ABO + RH BLD: NORMAL
ANA SER QL: NEGATIVE
BH BB BLOOD EXPIRATION DATE: NORMAL
BH BB BLOOD EXPIRATION DATE: NORMAL
BH BB BLOOD TYPE BARCODE: 9500
BH BB BLOOD TYPE BARCODE: 9500
BH BB DISPENSE STATUS: NORMAL
BH BB DISPENSE STATUS: NORMAL
BH BB PRODUCT CODE: NORMAL
BH BB PRODUCT CODE: NORMAL
BH BB UNIT NUMBER: NORMAL
BH BB UNIT NUMBER: NORMAL
CROSSMATCH INTERPRETATION: NORMAL
CROSSMATCH INTERPRETATION: NORMAL
FOLATE BLD-MCNC: 339 NG/ML
FOLATE RBC-MCNC: 848 NG/ML
HCT VFR BLD AUTO: 40 % (ref 34–46.6)
INSULIN SERPL-ACNC: 16 UIU/ML
THYROGLOB AB SERPL-ACNC: <1 IU/ML (ref 0–0.9)
UNIT  ABO: NORMAL
UNIT  ABO: NORMAL
UNIT  RH: NORMAL
UNIT  RH: NORMAL

## 2019-09-09 NOTE — OUTREACH NOTE
Sepsis Week 1 Survey      Responses   Facility patient discharged from?  [facility]   Does the patient have one of the following disease processes/diagnoses(primary or secondary)?  Sepsis   Is there a successful TCM telephone encounter documented?  No   Week 1 attempt successful?  Yes   Call start time  1641   Call end time  1645   Discharge diagnosis  UTI with altered mental status,  Urosepsis,     Meds reviewed with patient/caregiver?  Yes   Is the patient having any side effects they believe may be caused by any medication additions or changes?  No   Does the patient have all medications related to this admission filled (includes all antibiotics, inhalers, nebulizers,steroids,etc.)  Yes   Is the patient taking all medications as directed (includes completed medication regime)?  Yes   Medication comments  She has not picked her medications up yet d/t cost.  She states she will go now and fill the ABX.   Does the patient have a primary care provider?   Yes   Does the patient have an appointment with their PCP within 7 days of discharge?  Greater than 7 days   What is preventing the patient from scheduling follow up appointments within 7 days of discharge?  Earlier appointment not available   Nursing Interventions  Educated patient on importance of making appointment   Has the patient kept scheduled appointments due by today?  N/A   Has home health visited the patient within 72 hours of discharge?  N/A   Psychosocial issues?  No   Did the patient receive a copy of their discharge instructions?  Yes   Nursing interventions  Reviewed instructions with patient   What is the patient's perception of their health status since discharge?  Improving   Nursing interventions  Nurse provided patient education   Is the patient/caregiver able to teach back Sepsis?  S - Shivering,fever or very cold, E - Extreme pain or generalized discomfort (worst ever,especially abdomen), P - Pale or discolored skin, S - Sleepy, difficult to  arouse,confused, I -   I feel like I might die-a feeling of hopelessness, S - Short of breath   Nursing interventions  Nurse provided reassurance to patient   Is patient/caregiver able to teach back steps to recovery at home?  Set small, achievable goals for return to baseline health, Rest and regain strength, Talk about feelings with family/friends, Record milestones and struggles in a journal, Eat a balanced diet, Make a list of questions for PCP appoinment, Exercise as tolerated   Is the patient/caregiver able to teach back signs and symptoms of worsening condition:  Fever, Rapid heart rate (>90), Shortness of breath/rapid respiratory rate, Edema   Is the patient/caregiver able to teach back the hierarchy of who to call/visit for symptoms/problems? PCP, Specialist, Home health nurse, Urgent Care, ED, 911  Yes   Additional teach back comments  Encouraged to fill ABX, states she is weak but improving.   Week 1 call completed?  Yes          Neeta Pires RN

## 2019-09-10 LAB
BACTERIA SPEC AEROBE CULT: NORMAL
BACTERIA SPEC AEROBE CULT: NORMAL

## 2019-09-11 LAB — THYROGLOBULIN (TG-RIA): 23 NG/ML

## 2019-09-16 ENCOUNTER — READMISSION MANAGEMENT (OUTPATIENT)
Dept: CALL CENTER | Facility: HOSPITAL | Age: 69
End: 2019-09-16

## 2019-09-16 NOTE — OUTREACH NOTE
Sepsis Week 2 Survey      Responses   Facility patient discharged from?  [facility]   Does the patient have one of the following disease processes/diagnoses(primary or secondary)?  Sepsis   Week 2 attempt successful?  No   Unsuccessful attempts  Attempt 1          Leda Cagle RN

## 2019-09-18 ENCOUNTER — READMISSION MANAGEMENT (OUTPATIENT)
Dept: CALL CENTER | Facility: HOSPITAL | Age: 69
End: 2019-09-18

## 2019-09-18 NOTE — OUTREACH NOTE
Sepsis Week 2 Survey      Responses   Facility patient discharged from?  [facility]   Does the patient have one of the following disease processes/diagnoses(primary or secondary)?  Sepsis   Week 2 attempt successful?  Yes   Call start time  0956   Call end time  1003   Meds reviewed with patient/caregiver?  Yes   Is the patient having any side effects they believe may be caused by any medication additions or changes?  No   Does the patient have all medications related to this admission filled (includes all antibiotics, inhalers, nebulizers,steroids,etc.)  Yes   Is the patient taking all medications as directed (includes completed medication regime)?  Yes   Comments regarding appointments  PCP appt 10/01/19   Does the patient have a primary care provider?   Yes   Does the patient have an appointment with their PCP within 7 days of discharge?  Greater than 7 days   What is preventing the patient from scheduling follow up appointments within 7 days of discharge?  Earlier appointment not available   Nursing Interventions  Verified appointment date/time/provider   Has the patient kept scheduled appointments due by today?  N/A   Has home health visited the patient within 72 hours of discharge?  N/A   Psychosocial issues?  No   Did the patient receive a copy of their discharge instructions?  Yes   Nursing interventions  Reviewed instructions with patient [no internet]   What is the patient's perception of their health status since discharge?  Improving   Nursing interventions  Nurse provided patient education   Is the patient/caregiver able to teach back Sepsis?  S - Shivering,fever or very cold, E - Extreme pain or generalized discomfort (worst ever,especially abdomen), P - Pale or discolored skin, S - Sleepy, difficult to arouse,confused, I -   I feel like I might die-a feeling of hopelessness, S - Short of breath   Nursing interventions  Nurse provided patient education   Is patient/caregiver able to teach back steps to  "recovery at home?  Set small, achievable goals for return to baseline health, Rest and regain strength, Talk about feelings with family/friends, Record milestones and struggles in a journal, Learn about sepsis(sepsis.org), Eat a balanced diet, Exercise as tolerated, Make a list of questions for PCP appoinment   Is the patient/caregiver able to teach back signs and symptoms of worsening condition:  Fever, Rapid heart rate (>90), Altered mental status(confusion/coma), High blood glucose without diabetes, Hyperthermia, Shortness of breath/rapid respiratory rate, Edema   If the patient is a current smoker, are they able to teach back resources for cessation?  Smoking cessation medications, Smoking cessation classes, Smoking cessation support groups, 3-080-MrcdDtr   Is the patient/caregiver able to teach back the hierarchy of who to call/visit for symptoms/problems? PCP, Specialist, Home health nurse, Urgent Care, ED, 911  Yes   Additional teach back comments  Reviewed s/s of UTI.   Week 2 call completed?  Yes   Revoked  No further contact(revokes)-requires comment   Graduated/Revoked comments  States is feeling much better-denies any UTI s/s or s/s of infection. States BS are staying \"good\". Denies any need for further calls.          Yu Avila RN  "

## 2019-12-13 ENCOUNTER — PATIENT OUTREACH (OUTPATIENT)
Dept: PHARMACY | Facility: HOSPITAL | Age: 69
End: 2019-12-13

## 2019-12-13 NOTE — OUTREACH NOTE
Medication Adherence Call     Patient was called today to discuss medication adherence to her rosuvastatin medication.  Pt is prescribed rosuvastatin 20mg nightly.     Patient was unable to be reached at this time.     Jaime Singer, IsabelaD

## 2020-01-01 ENCOUNTER — TRANSCRIBE ORDERS (OUTPATIENT)
Dept: SLEEP MEDICINE | Facility: HOSPITAL | Age: 70
End: 2020-01-01

## 2020-01-01 ENCOUNTER — LAB (OUTPATIENT)
Dept: LAB | Facility: HOSPITAL | Age: 70
End: 2020-01-01

## 2020-01-01 ENCOUNTER — HOSPITAL ENCOUNTER (OUTPATIENT)
Facility: HOSPITAL | Age: 70
Setting detail: HOSPITAL OUTPATIENT SURGERY
Discharge: HOME | End: 2020-11-24
Attending: INTERNAL MEDICINE | Admitting: INTERNAL MEDICINE

## 2020-01-01 ENCOUNTER — TELEPHONE (OUTPATIENT)
Dept: CARDIOLOGY | Facility: CLINIC | Age: 70
End: 2020-01-01

## 2020-01-01 ENCOUNTER — HOSPITAL ENCOUNTER (OUTPATIENT)
Dept: CARDIOLOGY | Facility: HOSPITAL | Age: 70
Discharge: HOME | End: 2020-11-19
Admitting: INTERNAL MEDICINE

## 2020-01-01 ENCOUNTER — OFFICE VISIT (OUTPATIENT)
Dept: CARDIOLOGY | Facility: CLINIC | Age: 70
End: 2020-01-01

## 2020-01-01 ENCOUNTER — TRANSCRIBE ORDERS (OUTPATIENT)
Dept: ADMINISTRATIVE | Facility: HOSPITAL | Age: 70
End: 2020-01-01

## 2020-01-01 VITALS
HEART RATE: 61 BPM | SYSTOLIC BLOOD PRESSURE: 107 MMHG | WEIGHT: 165 LBS | RESPIRATION RATE: 16 BRPM | OXYGEN SATURATION: 93 % | TEMPERATURE: 99 F | BODY MASS INDEX: 27.49 KG/M2 | DIASTOLIC BLOOD PRESSURE: 59 MMHG | HEIGHT: 65 IN

## 2020-01-01 VITALS
SYSTOLIC BLOOD PRESSURE: 127 MMHG | HEIGHT: 65 IN | HEART RATE: 66 BPM | BODY MASS INDEX: 26.99 KG/M2 | WEIGHT: 162 LBS | DIASTOLIC BLOOD PRESSURE: 72 MMHG

## 2020-01-01 VITALS
DIASTOLIC BLOOD PRESSURE: 72 MMHG | WEIGHT: 162 LBS | BODY MASS INDEX: 26.99 KG/M2 | SYSTOLIC BLOOD PRESSURE: 127 MMHG | HEART RATE: 66 BPM | HEIGHT: 65 IN

## 2020-01-01 VITALS
HEIGHT: 65 IN | BODY MASS INDEX: 28.32 KG/M2 | DIASTOLIC BLOOD PRESSURE: 65 MMHG | SYSTOLIC BLOOD PRESSURE: 118 MMHG | WEIGHT: 170 LBS | HEART RATE: 64 BPM

## 2020-01-01 DIAGNOSIS — R55 SYNCOPE AND COLLAPSE: ICD-10-CM

## 2020-01-01 DIAGNOSIS — Z01.818 OTHER SPECIFIED PRE-OPERATIVE EXAMINATION: Primary | ICD-10-CM

## 2020-01-01 DIAGNOSIS — I47.20 WIDE QRS VENTRICULAR TACHYCARDIA (HCC): ICD-10-CM

## 2020-01-01 DIAGNOSIS — Z01.818 OTHER SPECIFIED PRE-OPERATIVE EXAMINATION: ICD-10-CM

## 2020-01-01 DIAGNOSIS — Z72.0 TOBACCO USE: ICD-10-CM

## 2020-01-01 DIAGNOSIS — Z95.818 STATUS POST PLACEMENT OF IMPLANTABLE LOOP RECORDER: ICD-10-CM

## 2020-01-01 DIAGNOSIS — R55 SYNCOPE AND COLLAPSE: Primary | ICD-10-CM

## 2020-01-01 LAB
ANION GAP SERPL CALCULATED.3IONS-SCNC: 5.3 MMOL/L (ref 5–15)
AORTIC DIMENSIONLESS INDEX: 0.7 (DI)
APTT PPP: 29.3 SECONDS (ref 22.7–35.4)
BASOPHILS # BLD AUTO: 0.04 10*3/MM3 (ref 0–0.2)
BASOPHILS NFR BLD AUTO: 0.7 % (ref 0–1.5)
BH CV ECHO MEAS - ACS: 1.5 CM
BH CV ECHO MEAS - AO MAX PG (FULL): 8 MMHG
BH CV ECHO MEAS - AO MAX PG: 15.1 MMHG
BH CV ECHO MEAS - AO MEAN PG (FULL): 3.9 MMHG
BH CV ECHO MEAS - AO MEAN PG: 7.9 MMHG
BH CV ECHO MEAS - AO ROOT AREA (BSA CORRECTED): 1.6
BH CV ECHO MEAS - AO ROOT AREA: 6.9 CM^2
BH CV ECHO MEAS - AO ROOT DIAM: 3 CM
BH CV ECHO MEAS - AO V2 MAX: 194.6 CM/SEC
BH CV ECHO MEAS - AO V2 MEAN: 132.6 CM/SEC
BH CV ECHO MEAS - AO V2 VTI: 41 CM
BH CV ECHO MEAS - AVA(I,A): 1.8 CM^2
BH CV ECHO MEAS - AVA(I,D): 1.8 CM^2
BH CV ECHO MEAS - AVA(V,A): 1.9 CM^2
BH CV ECHO MEAS - AVA(V,D): 1.9 CM^2
BH CV ECHO MEAS - BSA(HAYCOCK): 1.9 M^2
BH CV ECHO MEAS - BSA: 1.8 M^2
BH CV ECHO MEAS - BZI_BMI: 27 KILOGRAMS/M^2
BH CV ECHO MEAS - BZI_METRIC_HEIGHT: 165.1 CM
BH CV ECHO MEAS - BZI_METRIC_WEIGHT: 73.5 KG
BH CV ECHO MEAS - EDV(CUBED): 106.2 ML
BH CV ECHO MEAS - EDV(MOD-SP2): 73 ML
BH CV ECHO MEAS - EDV(MOD-SP4): 74 ML
BH CV ECHO MEAS - EDV(TEICH): 104.2 ML
BH CV ECHO MEAS - EF(CUBED): 83.2 %
BH CV ECHO MEAS - EF(MOD-BP): 55.1 %
BH CV ECHO MEAS - EF(MOD-SP2): 54.8 %
BH CV ECHO MEAS - EF(MOD-SP4): 59.5 %
BH CV ECHO MEAS - EF(TEICH): 76.1 %
BH CV ECHO MEAS - ESV(CUBED): 17.8 ML
BH CV ECHO MEAS - ESV(MOD-SP2): 33 ML
BH CV ECHO MEAS - ESV(MOD-SP4): 30 ML
BH CV ECHO MEAS - ESV(TEICH): 24.9 ML
BH CV ECHO MEAS - FS: 44.9 %
BH CV ECHO MEAS - IVS/LVPW: 0.96
BH CV ECHO MEAS - IVSD: 0.82 CM
BH CV ECHO MEAS - LAT PEAK E' VEL: 10.7 CM/SEC
BH CV ECHO MEAS - LV DIASTOLIC VOL/BSA (35-75): 40.9 ML/M^2
BH CV ECHO MEAS - LV MASS(C)D: 131.8 GRAMS
BH CV ECHO MEAS - LV MASS(C)DI: 72.9 GRAMS/M^2
BH CV ECHO MEAS - LV MAX PG: 7.1 MMHG
BH CV ECHO MEAS - LV MEAN PG: 4 MMHG
BH CV ECHO MEAS - LV SYSTOLIC VOL/BSA (12-30): 16.6 ML/M^2
BH CV ECHO MEAS - LV V1 MAX: 133.5 CM/SEC
BH CV ECHO MEAS - LV V1 MEAN: 94.4 CM/SEC
BH CV ECHO MEAS - LV V1 VTI: 27.2 CM
BH CV ECHO MEAS - LVIDD: 4.7 CM
BH CV ECHO MEAS - LVIDS: 2.6 CM
BH CV ECHO MEAS - LVLD AP2: 6.9 CM
BH CV ECHO MEAS - LVLD AP4: 7 CM
BH CV ECHO MEAS - LVLS AP2: 5.4 CM
BH CV ECHO MEAS - LVLS AP4: 5.7 CM
BH CV ECHO MEAS - LVOT AREA (M): 2.8 CM^2
BH CV ECHO MEAS - LVOT AREA: 2.7 CM^2
BH CV ECHO MEAS - LVOT DIAM: 1.9 CM
BH CV ECHO MEAS - LVPWD: 0.86 CM
BH CV ECHO MEAS - MED PEAK E' VEL: 9.7 CM/SEC
BH CV ECHO MEAS - MR MAX PG: 78.8 MMHG
BH CV ECHO MEAS - MR MAX VEL: 443.9 CM/SEC
BH CV ECHO MEAS - MV A DUR: 0.14 SEC
BH CV ECHO MEAS - MV A MAX VEL: 100.9 CM/SEC
BH CV ECHO MEAS - MV DEC SLOPE: 472.9 CM/SEC^2
BH CV ECHO MEAS - MV DEC TIME: 180 SEC
BH CV ECHO MEAS - MV E MAX VEL: 109.1 CM/SEC
BH CV ECHO MEAS - MV E/A: 1.1
BH CV ECHO MEAS - MV MAX PG: 6 MMHG
BH CV ECHO MEAS - MV MEAN PG: 2.7 MMHG
BH CV ECHO MEAS - MV P1/2T MAX VEL: 131.7 CM/SEC
BH CV ECHO MEAS - MV P1/2T: 81.6 MSEC
BH CV ECHO MEAS - MV V2 MAX: 122.2 CM/SEC
BH CV ECHO MEAS - MV V2 MEAN: 77.7 CM/SEC
BH CV ECHO MEAS - MV V2 VTI: 36.1 CM
BH CV ECHO MEAS - MVA P1/2T LCG: 1.7 CM^2
BH CV ECHO MEAS - MVA(P1/2T): 2.7 CM^2
BH CV ECHO MEAS - MVA(VTI): 2.1 CM^2
BH CV ECHO MEAS - PA ACC TIME: 0.05 SEC
BH CV ECHO MEAS - PA MAX PG (FULL): 3.1 MMHG
BH CV ECHO MEAS - PA MAX PG: 7.2 MMHG
BH CV ECHO MEAS - PA PR(ACCEL): 55.2 MMHG
BH CV ECHO MEAS - PA V2 MAX: 134.4 CM/SEC
BH CV ECHO MEAS - PULM A REVS DUR: 0.13 SEC
BH CV ECHO MEAS - PULM A REVS VEL: 32.6 CM/SEC
BH CV ECHO MEAS - PULM DIAS VEL: 53.4 CM/SEC
BH CV ECHO MEAS - PULM S/D: 1.3
BH CV ECHO MEAS - PULM SYS VEL: 70.2 CM/SEC
BH CV ECHO MEAS - RAP SYSTOLE: 3 MMHG
BH CV ECHO MEAS - RV MAX PG: 4.2 MMHG
BH CV ECHO MEAS - RV MEAN PG: 2.5 MMHG
BH CV ECHO MEAS - RV V1 MAX: 101.9 CM/SEC
BH CV ECHO MEAS - RV V1 MEAN: 76.2 CM/SEC
BH CV ECHO MEAS - RV V1 VTI: 22.3 CM
BH CV ECHO MEAS - RVSP: 25 MMHG
BH CV ECHO MEAS - SI(AO): 155.7 ML/M^2
BH CV ECHO MEAS - SI(CUBED): 48.9 ML/M^2
BH CV ECHO MEAS - SI(LVOT): 41.1 ML/M^2
BH CV ECHO MEAS - SI(MOD-SP2): 22.1 ML/M^2
BH CV ECHO MEAS - SI(MOD-SP4): 24.3 ML/M^2
BH CV ECHO MEAS - SI(TEICH): 43.8 ML/M^2
BH CV ECHO MEAS - SV(AO): 281.5 ML
BH CV ECHO MEAS - SV(CUBED): 88.4 ML
BH CV ECHO MEAS - SV(LVOT): 74.3 ML
BH CV ECHO MEAS - SV(MOD-SP2): 40 ML
BH CV ECHO MEAS - SV(MOD-SP4): 44 ML
BH CV ECHO MEAS - SV(TEICH): 79.3 ML
BH CV ECHO MEAS - TAPSE (>1.6): 2 CM
BH CV ECHO MEAS - TR MAX PG: 22 MMHG
BH CV ECHO MEAS - TR MAX VEL: 236.8 CM/SEC
BH CV ECHO MEASUREMENTS AVERAGE E/E' RATIO: 10.7
BH CV XLRA - RV BASE: 3.3 CM
BH CV XLRA - TDI S': 13.3 CM/SEC
BUN SERPL-MCNC: 11 MG/DL (ref 8–23)
BUN/CREAT SERPL: 14.3 (ref 7–25)
CALCIUM SPEC-SCNC: 8.6 MG/DL (ref 8.6–10.5)
CHLORIDE SERPL-SCNC: 98 MMOL/L (ref 98–107)
CO2 SERPL-SCNC: 29.7 MMOL/L (ref 22–29)
CREAT SERPL-MCNC: 0.77 MG/DL (ref 0.57–1)
DEPRECATED RDW RBC AUTO: 42 FL (ref 37–54)
EOSINOPHIL # BLD AUTO: 0 10*3/MM3 (ref 0–0.4)
EOSINOPHIL NFR BLD AUTO: 0 % (ref 0.3–6.2)
ERYTHROCYTE [DISTWIDTH] IN BLOOD BY AUTOMATED COUNT: 13.6 % (ref 12.3–15.4)
GFR SERPL CREATININE-BSD FRML MDRD: 75 ML/MIN/1.73
GLUCOSE BLDC GLUCOMTR-MCNC: 80 MG/DL (ref 70–130)
GLUCOSE BLDC GLUCOMTR-MCNC: 91 MG/DL (ref 70–130)
GLUCOSE SERPL-MCNC: 144 MG/DL (ref 65–99)
HCT VFR BLD AUTO: 37 % (ref 34–46.6)
HGB BLD-MCNC: 12 G/DL (ref 12–15.9)
IMM GRANULOCYTES # BLD AUTO: 0.03 10*3/MM3 (ref 0–0.05)
IMM GRANULOCYTES NFR BLD AUTO: 0.5 % (ref 0–0.5)
INR PPP: 1.08 (ref 0.9–1.1)
LEFT ATRIUM VOLUME INDEX: 32.3 ML/M2
LYMPHOCYTES # BLD AUTO: 0.86 10*3/MM3 (ref 0.7–3.1)
LYMPHOCYTES NFR BLD AUTO: 15.4 % (ref 19.6–45.3)
MAXIMAL PREDICTED HEART RATE: 152 BPM
MCH RBC QN AUTO: 27.9 PG (ref 26.6–33)
MCHC RBC AUTO-ENTMCNC: 32.4 G/DL (ref 31.5–35.7)
MCV RBC AUTO: 86 FL (ref 79–97)
MONOCYTES # BLD AUTO: 0.44 10*3/MM3 (ref 0.1–0.9)
MONOCYTES NFR BLD AUTO: 7.9 % (ref 5–12)
NEUTROPHILS NFR BLD AUTO: 4.22 10*3/MM3 (ref 1.7–7)
NEUTROPHILS NFR BLD AUTO: 75.5 % (ref 42.7–76)
NRBC BLD AUTO-RTO: 0 /100 WBC (ref 0–0.2)
PLATELET # BLD AUTO: 219 10*3/MM3 (ref 140–450)
PMV BLD AUTO: 10.7 FL (ref 6–12)
POTASSIUM SERPL-SCNC: 4.3 MMOL/L (ref 3.5–5.2)
PROTHROMBIN TIME: 13.8 SECONDS (ref 11.7–14.2)
RBC # BLD AUTO: 4.3 10*6/MM3 (ref 3.77–5.28)
SARS-COV-2 RNA NPH QL NAA+NON-PROBE: NOT DETECTED
SARS-COV-2 RNA RESP QL NAA+PROBE: NOT DETECTED
SODIUM SERPL-SCNC: 133 MMOL/L (ref 136–145)
STRESS TARGET HR: 129 BPM
WBC # BLD AUTO: 5.59 10*3/MM3 (ref 3.4–10.8)

## 2020-01-01 PROCEDURE — 93306 TTE W/DOPPLER COMPLETE: CPT | Performed by: INTERNAL MEDICINE

## 2020-01-01 PROCEDURE — 25010000002 MIDAZOLAM PER 1 MG: Performed by: INTERNAL MEDICINE

## 2020-01-01 PROCEDURE — 93458 L HRT ARTERY/VENTRICLE ANGIO: CPT | Performed by: INTERNAL MEDICINE

## 2020-01-01 PROCEDURE — C1764 EVENT RECORDER, CARDIAC: HCPCS | Performed by: INTERNAL MEDICINE

## 2020-01-01 PROCEDURE — 85610 PROTHROMBIN TIME: CPT | Performed by: INTERNAL MEDICINE

## 2020-01-01 PROCEDURE — 99212 OFFICE O/P EST SF 10 MIN: CPT | Performed by: INTERNAL MEDICINE

## 2020-01-01 PROCEDURE — 99214 OFFICE O/P EST MOD 30 MIN: CPT | Performed by: INTERNAL MEDICINE

## 2020-01-01 PROCEDURE — 36415 COLL VENOUS BLD VENIPUNCTURE: CPT | Performed by: INTERNAL MEDICINE

## 2020-01-01 PROCEDURE — C1769 GUIDE WIRE: HCPCS | Performed by: INTERNAL MEDICINE

## 2020-01-01 PROCEDURE — C9803 HOPD COVID-19 SPEC COLLECT: HCPCS

## 2020-01-01 PROCEDURE — 25010000002 HEPARIN (PORCINE) PER 1000 UNITS: Performed by: INTERNAL MEDICINE

## 2020-01-01 PROCEDURE — 0 IOPAMIDOL PER 1 ML: Performed by: INTERNAL MEDICINE

## 2020-01-01 PROCEDURE — 80048 BASIC METABOLIC PNL TOTAL CA: CPT | Performed by: INTERNAL MEDICINE

## 2020-01-01 PROCEDURE — C9803 HOPD COVID-19 SPEC COLLECT: HCPCS | Performed by: INTERNAL MEDICINE

## 2020-01-01 PROCEDURE — 87635 SARS-COV-2 COVID-19 AMP PRB: CPT | Performed by: INTERNAL MEDICINE

## 2020-01-01 PROCEDURE — C1894 INTRO/SHEATH, NON-LASER: HCPCS | Performed by: INTERNAL MEDICINE

## 2020-01-01 PROCEDURE — 85025 COMPLETE CBC W/AUTO DIFF WBC: CPT | Performed by: INTERNAL MEDICINE

## 2020-01-01 PROCEDURE — 82962 GLUCOSE BLOOD TEST: CPT

## 2020-01-01 PROCEDURE — 99152 MOD SED SAME PHYS/QHP 5/>YRS: CPT | Performed by: INTERNAL MEDICINE

## 2020-01-01 PROCEDURE — 33285 INSJ SUBQ CAR RHYTHM MNTR: CPT | Performed by: INTERNAL MEDICINE

## 2020-01-01 PROCEDURE — 25010000002 FENTANYL CITRATE (PF) 100 MCG/2ML SOLUTION: Performed by: INTERNAL MEDICINE

## 2020-01-01 PROCEDURE — U0004 COV-19 TEST NON-CDC HGH THRU: HCPCS

## 2020-01-01 PROCEDURE — 93000 ELECTROCARDIOGRAM COMPLETE: CPT | Performed by: INTERNAL MEDICINE

## 2020-01-01 PROCEDURE — 93306 TTE W/DOPPLER COMPLETE: CPT

## 2020-01-01 PROCEDURE — 85730 THROMBOPLASTIN TIME PARTIAL: CPT | Performed by: INTERNAL MEDICINE

## 2020-01-01 DEVICE — ICM LP/RECRD REVEAL LINQ MEDTRONIC: Type: IMPLANTABLE DEVICE | Site: CHEST WALL | Status: FUNCTIONAL

## 2020-01-01 RX ORDER — CHLORAL HYDRATE 500 MG
500 CAPSULE ORAL
COMMUNITY

## 2020-01-01 RX ORDER — OMEPRAZOLE 40 MG/1
40 CAPSULE, DELAYED RELEASE ORAL DAILY
COMMUNITY

## 2020-01-01 RX ORDER — IBUPROFEN 800 MG/1
TABLET ORAL
COMMUNITY
Start: 2020-01-01

## 2020-01-01 RX ORDER — CARVEDILOL 6.25 MG/1
TABLET ORAL
Status: ON HOLD | COMMUNITY
Start: 2020-01-01 | End: 2020-01-01

## 2020-01-01 RX ORDER — POTASSIUM CHLORIDE 20 MEQ/1
TABLET, EXTENDED RELEASE ORAL
COMMUNITY
Start: 2020-01-01

## 2020-01-01 RX ORDER — SODIUM CHLORIDE 0.9 % (FLUSH) 0.9 %
10 SYRINGE (ML) INJECTION AS NEEDED
Status: DISCONTINUED | OUTPATIENT
Start: 2020-01-01 | End: 2020-01-01 | Stop reason: HOSPADM

## 2020-01-01 RX ORDER — LIDOCAINE HYDROCHLORIDE AND EPINEPHRINE 10; 10 MG/ML; UG/ML
INJECTION, SOLUTION INFILTRATION; PERINEURAL AS NEEDED
Status: DISCONTINUED | OUTPATIENT
Start: 2020-01-01 | End: 2020-01-01 | Stop reason: HOSPADM

## 2020-01-01 RX ORDER — LIDOCAINE HYDROCHLORIDE 20 MG/ML
INJECTION, SOLUTION INFILTRATION; PERINEURAL AS NEEDED
Status: DISCONTINUED | OUTPATIENT
Start: 2020-01-01 | End: 2020-01-01 | Stop reason: HOSPADM

## 2020-01-01 RX ORDER — MIDAZOLAM HYDROCHLORIDE 1 MG/ML
INJECTION INTRAMUSCULAR; INTRAVENOUS AS NEEDED
Status: DISCONTINUED | OUTPATIENT
Start: 2020-01-01 | End: 2020-01-01 | Stop reason: HOSPADM

## 2020-01-01 RX ORDER — ROSUVASTATIN CALCIUM 40 MG/1
TABLET, COATED ORAL
COMMUNITY
Start: 2020-01-01

## 2020-01-01 RX ORDER — SODIUM CHLORIDE 0.9 % (FLUSH) 0.9 %
3 SYRINGE (ML) INJECTION EVERY 12 HOURS SCHEDULED
Status: DISCONTINUED | OUTPATIENT
Start: 2020-01-01 | End: 2020-01-01 | Stop reason: HOSPADM

## 2020-01-01 RX ORDER — SODIUM CHLORIDE 9 MG/ML
75 INJECTION, SOLUTION INTRAVENOUS CONTINUOUS
Status: DISCONTINUED | OUTPATIENT
Start: 2020-01-01 | End: 2020-01-01 | Stop reason: HOSPADM

## 2020-01-01 RX ORDER — FENTANYL CITRATE 50 UG/ML
INJECTION, SOLUTION INTRAMUSCULAR; INTRAVENOUS AS NEEDED
Status: DISCONTINUED | OUTPATIENT
Start: 2020-01-01 | End: 2020-01-01 | Stop reason: HOSPADM

## 2020-01-01 RX ORDER — ACETAMINOPHEN 325 MG/1
650 TABLET ORAL EVERY 4 HOURS PRN
Status: DISCONTINUED | OUTPATIENT
Start: 2020-01-01 | End: 2020-01-01 | Stop reason: HOSPADM

## 2020-01-01 RX ORDER — MULTIPLE VITAMINS W/ MINERALS TAB 9MG-400MCG
1 TAB ORAL DAILY
COMMUNITY

## 2020-01-01 RX ADMIN — SODIUM CHLORIDE 75 ML/HR: 9 INJECTION, SOLUTION INTRAVENOUS at 07:59

## 2020-11-19 PROBLEM — Z72.0 TOBACCO USE: Status: ACTIVE | Noted: 2020-01-01

## 2020-11-19 PROBLEM — R55 SYNCOPE AND COLLAPSE: Status: ACTIVE | Noted: 2020-01-01

## 2020-11-19 PROBLEM — I47.20 WIDE QRS VENTRICULAR TACHYCARDIA (HCC): Status: ACTIVE | Noted: 2020-01-01

## 2020-11-19 NOTE — H&P (VIEW-ONLY)
NP REF BY DR R REYES SUDDEN CARDIAC ARREST   Subjective:        [facility]  Cardiology Consult Note    Patient Identification:  Name: Marlyn Rosario  Age: 70 y.o.  Sex: female  :  1950  MRN: [id number]             CC  Syncope  Questionable cardiac arrest          History of Present Illness:   70-year-old female here for the cardiac evaluation as well as establishment of the care, as the patient was found unconscious in the bathroom, CPR was performed EMS was summoned, patient was found to have a wide complex tachycardia for which patient was advised to go to the hospital but refused here for further outpatient work-up denies any chest pains or tightness in the chest    Comorbid cardiac risk factors:     Past Medical History:  Past Medical History:   Diagnosis Date   • Arthritis    • Asthma    • Carotid artery stenosis 2019   • Chronic midline low back pain with left-sided sciatica    • Cystitis    • Diabetic peripheral neuropathy (CMS/HCC)    • Generalized anxiety disorder    • Hyperlipidemia    • Nephrolithiasis    • Urinary tract infection      Past Surgical History:  Past Surgical History:   Procedure Laterality Date   • BREAST BIOPSY     • LAPAROSCOPIC CHOLECYSTECTOMY W/ CHOLANGIOGRAPHY  2012   • LIVER BIOPSY  2012   • SUBTOTAL HYSTERECTOMY     • UPPER GASTROINTESTINAL ENDOSCOPY  2012      Allergies:  Allergies   Allergen Reactions   • Gabapentin    • Lyrica [Pregabalin]    • Wellbutrin [Bupropion]      Home Meds:  (Not in a hospital admission)    Current Meds:   [unfilled]  Social History:   Social History     Tobacco Use   • Smoking status: Former Smoker   • Smokeless tobacco: Never Used   Substance Use Topics   • Alcohol use: No      Family History:  Family History   Adopted: Yes        Review of Systems    Constitutional: No weakness,fatigue, fever, rigors, chills   Eyes: No vision changes, eye pain   ENT/oropharynx: No difficulty swallowing, sore throat,  epistaxis, changes in hearing   Cardiovascular: No chest pain, chest tightness, palpitations, paroxysmal nocturnal dyspnea, orthopnea, diaphoresis, dizziness / syncopal episode   Respiratory: No shortness of breath, dyspnea on exertion, cough, wheezing hemoptysis   Gastrointestinal: No abdominal pain, nausea, vomiting, diarrhea, bloody stools   Genitourinary: No hematuria, dysuria   Neurological:  Syncope   Musculoskeletal: No cramps, myalgias,  joint pain, joint swelling   Integument: No rash, edema           Constitutional:  Heart Rate:  [66] 66  BP: (127)/(72) 127/72    Physical Exam   General:  Appears in no acute distress  Eyes: PERTL,  HEENT:  No JVD. Thyroid not visibly enlarged. No mucosal pallor or cyanosis  Respiratory: Respirations regular and unlabored at rest. BBS with good air entry in all fields. No crackles, rubs or wheezes auscultated  Cardiovascular: S1S2 Regular rate and rhythm. No murmur, rub or gallop auscultated. No carotid bruits. DP/PT pulses    . No pretibial pitting edema  Gastrointestinal: Abdomen soft, flat, non tender. Bowel sounds present. No hepatosplenomegaly. No ascites  Musculoskeletal: RIBERA x4. No abnormal movements  Extremities: No digital clubbing or cyanosis  Skin: Color pink. Skin warm and dry to touch. No rashes  No xanthoma  Neuro: AAO x3 CN II-XII grossly intact            ECG 12 Lead    Date/Time: 11/19/2020 9:36 AM  Performed by: Erica Jones MD  Authorized by: Erica Jones MD   Comparison: compared with previous ECG   Similar to previous ECG  Rhythm: sinus rhythm  ST Flattening: all    Clinical impression: non-specific ECG                Cardiographics  ECG:     Telemetry:    Echocardiogram:     Imaging  Chest X-ray:     Lab Review               [email or web address]@              Assessment:/ Recommendations / Plan:   Patient Active Problem List   Diagnosis   • Left lower quadrant pain   • Right upper quadrant pain   • Acute cystitis   • Acute frontal  sinusitis   • Acute maxillary sinusitis   • Asthmatic bronchitis   • Carpal tunnel syndrome   • Eczema   • Brittle diabetes mellitus (CMS/HCC)   • Dysuria   • Fatigue   • Fibrocystic breast changes   • Generalized anxiety disorder   • Generalized osteoarthritis   • Hyperlipidemia   • Hypoglycemia   • Foot-drop   • Nonalcoholic fatty liver disease   • Otitis media   • Peripheral neuropathy   • Sciatica   • Type 2 diabetes mellitus with diabetic polyneuropathy, with long-term current use of insulin (CMS/HCC)   • Upper respiratory tract infection   • Increased frequency of urination   • Urinary urgency   • Urinary tract infection   • Candidiasis of vagina   • Vertigo   • Cobalamin deficiency   • Vitamin D deficiency   • Adhesive capsulitis of left shoulder   • Left shoulder pain   • Health care maintenance   • Slow transit constipation   • Bursitis/tendonitis, shoulder   • Medicare annual wellness visit, subsequent   • Chronic midline low back pain with left-sided sciatica   • Controlled substance agreement signed   • Visit for screening mammogram   • Hospital discharge follow-up   • Pyelonephritis   • Angular cheilitis   • Recurrent UTI                    ICD-10-CM ICD-9-CM   1. Syncope and collapse  R55 780.2   2. Wide QRS ventricular tachycardia (CMS/HCC)  I47.2 427.1   3. Tobacco use  Z72.0 305.1     1. Syncope and collapse  Patient will need further work-up  - Adult Transthoracic Echo Complete W/ Cont if Necessary Per Protocol  - Case Request Cath Lab: Left Heart Cath  - CBC & Differential  - Basic Metabolic Panel  - aPTT  - Protime-INR    2. Wide QRS ventricular tachycardia (CMS/HCC)  Patient would need diagnostic heart catheter  - Adult Transthoracic Echo Complete W/ Cont if Necessary Per Protocol  - Case Request Cath Lab: Left Heart Cath  - CBC & Differential  - Basic Metabolic Panel  - aPTT  - Protime-INR    3. Tobacco use  Counseling has been done  - Adult Transthoracic Echo Complete W/ Cont if Necessary Per  Protocol  - Case Request Cath Lab: Left Heart Cath  - CBC & Differential  - Basic Metabolic Panel  - aPTT  - Protime-INR       Admit to hosp, echo, cath and loop    Labs/tests ordered for am      Erica Jones MD  11/19/2020, 09:35 EST      EMR Dragon/Transcription:   Dictated utilizing Dragon dictation

## 2020-11-24 NOTE — DISCHARGE INSTRUCTIONS
[facility]  [address] [place] [address]    Coronary Angiogram (Radial/Ulnar Approach) After Care    Refer to this sheet in the next few weeks. These instructions provide you with information on caring for yourself after your procedure. Your caregiver may also give you more specific instructions. Your treatment has been planned according to current medical practices, but problems sometimes occur. Call your caregiver if you have any problems or questions after your procedure.    Home Care Instructions:  · You may shower the day after the procedure. Remove the bandage (dressing) and gently wash the site with plain soap and water. Gently pat the site dry. You may apply a band aid daily for 2 days if desired.    · Do not apply powder or lotion to the site.  · Do not submerge the affected site in water for 3 to 5 days or until the site is completely healed.   · Do not lift, push or pull anything over 5 pounds for 5 days after your procedure. As a reference, a gallon of milk weighs 8 pounds.   · Inspect the site at least twice daily. You may notice some bruising at the site and it may be tender for 1 to 2 weeks.     · Increase your fluid intake for the next 2 days.    · Keep arm elevated for 24 hours. For the remainder of the day, keep your arm in “Pledge of Allegiance” position when up and about.     · You may drive 24 hours after the procedure unless otherwise instructed by your caregiver.  · Do not operate machinery or power tools for 24 hours.  · A responsible adult should be with you for the first 24 hours after you arrive home. Do not make any important legal decisions or sign legal papers for 24 hours.  Do not drink alcohol for 24 hours.    · Metformin or any medications containing Metformin should not be taken for 48 hours after your procedure.      Call Your Doctor if:   · You have unusual pain at the radial/ulnar (wrist) site.  · You have redness, warmth, swelling, or pain at the  radial/ulnar (wrist) site.  · You have drainage (other than a small amount of blood on the dressing).  · You have chills or a fever > 101.  · Your arm becomes pale or dark, cool, tingly, or numb.  · You have heavy bleeding from the site, hold pressure on the site for 20 minutes.  If the bleeding stops, apply a fresh bandage and call your cardiologist.  However, if you continue to have bleeding, call 911.

## 2020-12-02 NOTE — TELEPHONE ENCOUNTER
----- Message from Danielle Canela sent at 11/25/2020  2:35 PM EST -----  Regarding: LINQ  Contact: [phone]  GOT THE LINQ IMPLANTED AND SHE DOESN'T KNOW IF THE MACHINE IS WORKING???

## 2020-12-14 PROBLEM — Z95.818 STATUS POST PLACEMENT OF IMPLANTABLE LOOP RECORDER: Status: ACTIVE | Noted: 2020-01-01

## 2020-12-14 NOTE — PROGRESS NOTES
"CATH FOLLOW UP   Subjective:        Marlyn Rosario is a 70 y.o. female who here for follow up    CC  Post cath follow up  HPI  70-year-old female recently underwent diagnostic heart catheter was found to have a normal coronary arteries, patient had a Linq implanted had no complications     Problems Addressed this Visit        Cardiovascular and Mediastinum    Syncope and collapse - Primary       Other    Status post placement of implantable loop recorder      Diagnoses       Codes Comments    Syncope and collapse    -  Primary ICD-10-CM: R55  ICD-9-CM: 780.2     Status post placement of implantable loop recorder     ICD-10-CM: Z95.818  ICD-9-CM: V45.09         · .Successful right and left coronary angiogram and LV gram  · Successful Linq implantation  Normal coronary arteries with normal LV gram    The following portions of the patient's history were reviewed and updated as appropriate: allergies, current medications, past family history, past medical history, past social history, past surgical history and problem list.    Past Medical History:   Diagnosis Date   • Arthritis    • Asthma    • Carotid artery stenosis 9/6/2019   • Chronic midline low back pain with left-sided sciatica    • Cystitis    • Diabetic peripheral neuropathy (CMS/HCC)    • Generalized anxiety disorder    • Hyperlipidemia    • Nephrolithiasis    • Pancreatitis    • Urinary tract infection      reports that she has quit smoking. She has never used smokeless tobacco. She reports that she does not drink alcohol or use drugs.   Family History   Adopted: Yes       Review of Systems  Constitutional: No wt loss, fever, fatigue  Gastrointestinal: No nausea, abdominal pain  Behavioral/Psych: No insomnia or anxiety   Cardiovascular no chest pains or tightness in the chest  Objective:       Physical Exam  /65   Pulse 64   Ht 165.1 cm (65\")   Wt 77.1 kg (170 lb)   LMP  (LMP Unknown)   BMI 28.29 kg/m²   General appearance: No acute changes "   Neck: Trachea midline; NECK, supple, no thyromegaly or lymphadenopathy   Lungs: Normal size and shape, normal breath sounds, equal distribution of air, no rales and rhonchi   CV: S1-S2 regular, no murmurs, no rub, no gallop   Abdomen: Soft, non-tender; no masses , no abnormal abdominal sounds   Extremities: No deformity , normal color , no peripheral edema   Skin: Normal temperature, turgor and texture; no rash, ulcers      Linq healed well      Procedures      Echocardiogram:        Current Outpatient Medications:   •  ALPRAZolam (XANAX) 1 MG tablet, Take 1 mg by mouth Every 8 (Eight) Hours As Needed for Anxiety., Disp: , Rfl:   •  amLODIPine (NORVASC) 10 MG tablet, Take 1 tablet by mouth Daily., Disp: 30 tablet, Rfl: 12  •  aspirin 81 MG EC tablet, Take 81 mg by mouth Daily., Disp: , Rfl:   •  escitalopram (LEXAPRO) 10 MG tablet, Take 10 mg by mouth Daily., Disp: , Rfl:   •  fenofibrate (TRICOR) 145 MG tablet, Take 1 tablet by mouth Daily., Disp: 30 tablet, Rfl: 12  •  ibuprofen (ADVIL,MOTRIN) 800 MG tablet, , Disp: , Rfl:   •  Insulin Degludec (TRESIBA FLEXTOUCH) 200 UNIT/ML solution pen-injector pen injection, Inject 36 Units under the skin into the appropriate area as directed Daily., Disp: 5 pen, Rfl: PRN  •  insulin lispro (humaLOG) 100 UNIT/ML injection, Inject  under the skin into the appropriate area as directed 3 (Three) Times a Day Before Meals. SLIDING SCALE, Disp: , Rfl:   •  levothyroxine (SYNTHROID, LEVOTHROID) 50 MCG tablet, Take 1 tablet by mouth Daily., Disp: 30 tablet, Rfl: 12  •  metFORMIN (GLUCOPHAGE) 1000 MG tablet, Take 1 tablet by mouth 2 (Two) Times a Day With Meals., Disp:  , Rfl:   •  multivitamin with minerals (Centrum Silver 50+Women) tablet tablet, Take 1 tablet by mouth Daily., Disp: , Rfl:   •  Omega-3 Fatty Acids (fish oil) 1000 MG capsule capsule, Take 500 mg by mouth Daily With Breakfast., Disp: , Rfl:   •  omeprazole (priLOSEC) 40 MG capsule, Take 40 mg by mouth Daily., Disp: ,  Rfl:   •  oxyCODONE (ROXICODONE) 15 MG immediate release tablet, Take 15 mg by mouth 4 (Four) Times a Day., Disp: , Rfl:   •  pioglitazone (ACTOS) 30 MG tablet, Take 30 mg by mouth Daily., Disp: , Rfl:   •  potassium chloride (K-DUR,KLOR-CON) 20 MEQ CR tablet, , Disp: , Rfl:   •  rosuvastatin (CRESTOR) 40 MG tablet, , Disp: , Rfl:   •  valsartan (DIOVAN) 160 MG tablet, Take 1 tablet by mouth Daily., Disp: 30 tablet, Rfl: 12  •  vitamin D (ERGOCALCIFEROL) 57965 units capsule capsule, Take 1 capsule by mouth Every 7 (Seven) Days., Disp: 12 capsule, Rfl: 4  •  vitamin E 1000 UNIT capsule, Take 1,000 Units by mouth Daily., Disp: , Rfl:    Assessment:        Patient Active Problem List   Diagnosis   • Left lower quadrant pain   • Right upper quadrant pain   • Acute cystitis   • Acute frontal sinusitis   • Acute maxillary sinusitis   • Asthmatic bronchitis   • Carpal tunnel syndrome   • Eczema   • Brittle diabetes mellitus (CMS/HCC)   • Dysuria   • Fatigue   • Fibrocystic breast changes   • Generalized anxiety disorder   • Generalized osteoarthritis   • Hyperlipidemia   • Hypoglycemia   • Foot-drop   • Nonalcoholic fatty liver disease   • Otitis media   • Peripheral neuropathy   • Sciatica   • Type 2 diabetes mellitus with diabetic polyneuropathy, with long-term current use of insulin (CMS/HCC)   • Upper respiratory tract infection   • Increased frequency of urination   • Urinary urgency   • Urinary tract infection   • Candidiasis of vagina   • Vertigo   • Cobalamin deficiency   • Vitamin D deficiency   • Adhesive capsulitis of left shoulder   • Left shoulder pain   • Health care maintenance   • Slow transit constipation   • Bursitis/tendonitis, shoulder   • Medicare annual wellness visit, subsequent   • Chronic midline low back pain with left-sided sciatica   • Controlled substance agreement signed   • Visit for screening mammogram   • Hospital discharge follow-up   • Pyelonephritis   • Angular cheilitis   • Recurrent  UTI   • Syncope and collapse   • Wide QRS ventricular tachycardia (CMS/HCC)   • Tobacco use               Plan:            ICD-10-CM ICD-9-CM   1. Syncope and collapse  R55 780.2   2. Status post placement of implantable loop recorder  Z95.818 V45.09     1. Syncope and collapse  No more syncopal episode    2. Status post placement of implantable loop recorder  Healed well    Cath normal       See in 1 yr  Marlyn K Rosario here for the follow-up post heart catheter, being treated medically.Marlyn Rosario has no complications.  Access site has been examined shows no complications.  Patient has been advised to contact us with any further issues and questions related to the heart catheter    COUNSELING:    Marlyn MejiassCounseling was given to patient for the following topics: diagnostic results, risk factor reductions, impressions, risks and benefits of treatment options and importance of treatment compliance .       SMOKING COUNSELING:    [unfilled]    Dictated using Dragon dictation

## (undated) DEVICE — SKIN PREP TRAY W/CHG: Brand: MEDLINE INDUSTRIES, INC.

## (undated) DEVICE — GLIDESHEATH SLENDER STAINLESS STEEL KIT: Brand: GLIDESHEATH SLENDER

## (undated) DEVICE — KT MANIFLD CARDIAC

## (undated) DEVICE — CATH DIAG IMPULSE FR4 5F 100CM

## (undated) DEVICE — CATH DIAG IMPULSE FL3.5 5F 100CM

## (undated) DEVICE — CATH4F INF PIG 145Â° MOD 110CM: Brand: INFINITI

## (undated) DEVICE — GW EMR FIX EXCHG J STD .035 3MM 260CM

## (undated) DEVICE — PK CATH CARD 40